# Patient Record
Sex: FEMALE | URBAN - METROPOLITAN AREA
[De-identification: names, ages, dates, MRNs, and addresses within clinical notes are randomized per-mention and may not be internally consistent; named-entity substitution may affect disease eponyms.]

---

## 2020-06-19 ENCOUNTER — OUT OF OFFICE VISIT (OUTPATIENT)
Dept: URBAN - METROPOLITAN AREA MEDICAL CENTER 33 | Facility: MEDICAL CENTER | Age: 30
End: 2020-06-19
Payer: SELF-PAY

## 2020-06-19 DIAGNOSIS — E87.6 HYPOKALEMIA: ICD-10-CM

## 2020-06-19 DIAGNOSIS — U07.1 COVID-19: ICD-10-CM

## 2020-06-19 DIAGNOSIS — R10.84 ABDOMINAL CRAMPING, GENERALIZED: ICD-10-CM

## 2020-06-19 DIAGNOSIS — R19.4 ALTERED BOWEL HABITS: ICD-10-CM

## 2020-06-19 DIAGNOSIS — K51.00 CHRONIC PANCOLONIC ULCERATIVE COLITIS: ICD-10-CM

## 2020-06-19 DIAGNOSIS — K92.1 BLACK STOOL: ICD-10-CM

## 2020-06-19 PROCEDURE — 99254 IP/OBS CNSLTJ NEW/EST MOD 60: CPT | Performed by: INTERNAL MEDICINE

## 2020-06-19 PROCEDURE — 99232 SBSQ HOSP IP/OBS MODERATE 35: CPT | Performed by: INTERNAL MEDICINE

## 2020-06-22 ENCOUNTER — OUT OF OFFICE VISIT (OUTPATIENT)
Dept: URBAN - METROPOLITAN AREA MEDICAL CENTER 33 | Facility: MEDICAL CENTER | Age: 30
End: 2020-06-22
Payer: SELF-PAY

## 2020-06-22 DIAGNOSIS — U07.1 COVID-19: ICD-10-CM

## 2020-06-22 DIAGNOSIS — R10.84 ABDOMINAL CRAMPING, GENERALIZED: ICD-10-CM

## 2020-06-22 DIAGNOSIS — K59.09 CHRONIC CONSTIPATION: ICD-10-CM

## 2020-06-22 DIAGNOSIS — R19.7 ACUTE DIARRHEA: ICD-10-CM

## 2020-06-22 DIAGNOSIS — K51.00 CHRONIC PANCOLONIC ULCERATIVE COLITIS: ICD-10-CM

## 2020-06-22 PROCEDURE — 99233 SBSQ HOSP IP/OBS HIGH 50: CPT | Performed by: INTERNAL MEDICINE

## 2020-06-24 ENCOUNTER — OUT OF OFFICE VISIT (OUTPATIENT)
Dept: URBAN - METROPOLITAN AREA MEDICAL CENTER 33 | Facility: MEDICAL CENTER | Age: 30
End: 2020-06-24

## 2020-06-24 ENCOUNTER — OUT OF OFFICE VISIT (OUTPATIENT)
Dept: URBAN - METROPOLITAN AREA MEDICAL CENTER 33 | Facility: MEDICAL CENTER | Age: 30
End: 2020-06-24
Payer: SELF-PAY

## 2020-06-24 DIAGNOSIS — K51.00 CHRONIC PANCOLONIC ULCERATIVE COLITIS: ICD-10-CM

## 2020-06-24 DIAGNOSIS — R10.84 ABDOMINAL CRAMPING, GENERALIZED: ICD-10-CM

## 2020-06-24 DIAGNOSIS — R19.7 ACUTE DIARRHEA: ICD-10-CM

## 2020-06-24 DIAGNOSIS — U07.1 2019 NOVEL CORONAVIRUS DETECTED: ICD-10-CM

## 2020-06-24 PROCEDURE — 99442 PHONE E/M BY PHYS 11-20 MIN: CPT | Performed by: INTERNAL MEDICINE

## 2020-06-24 PROCEDURE — 99233 SBSQ HOSP IP/OBS HIGH 50: CPT | Performed by: INTERNAL MEDICINE

## 2021-03-05 ENCOUNTER — OUT OF OFFICE VISIT (OUTPATIENT)
Dept: URBAN - METROPOLITAN AREA MEDICAL CENTER 33 | Facility: MEDICAL CENTER | Age: 31
End: 2021-03-05
Payer: SELF-PAY

## 2021-03-05 DIAGNOSIS — R10.32 ABDOMINAL CRAMPING IN LEFT LOWER QUADRANT: ICD-10-CM

## 2021-03-05 DIAGNOSIS — R19.7 ACUTE DIARRHEA: ICD-10-CM

## 2021-03-05 DIAGNOSIS — K51.818 OTHER ULCERATIVE COLITIS WITH OTHER COMPLICATION: ICD-10-CM

## 2021-03-05 DIAGNOSIS — R10.12 ABDOMINAL BURNING SENSATION IN LEFT UPPER QUADRANT: ICD-10-CM

## 2021-03-05 PROCEDURE — 99254 IP/OBS CNSLTJ NEW/EST MOD 60: CPT | Performed by: INTERNAL MEDICINE

## 2021-03-08 ENCOUNTER — TELEPHONE ENCOUNTER (OUTPATIENT)
Dept: URBAN - METROPOLITAN AREA CLINIC 92 | Facility: CLINIC | Age: 31
End: 2021-03-08

## 2021-03-19 ENCOUNTER — DASHBOARD ENCOUNTERS (OUTPATIENT)
Age: 31
End: 2021-03-19

## 2021-03-23 ENCOUNTER — OFFICE VISIT (OUTPATIENT)
Dept: URBAN - METROPOLITAN AREA CLINIC 17 | Facility: CLINIC | Age: 31
End: 2021-03-23

## 2021-08-19 VITALS
SYSTOLIC BLOOD PRESSURE: 178 MMHG | RESPIRATION RATE: 16 BRPM | DIASTOLIC BLOOD PRESSURE: 107 MMHG | HEART RATE: 87 BPM | TEMPERATURE: 98.7 F | OXYGEN SATURATION: 98 %

## 2021-08-19 PROCEDURE — 99284 EMERGENCY DEPT VISIT MOD MDM: CPT

## 2021-08-19 PROCEDURE — 99285 EMERGENCY DEPT VISIT HI MDM: CPT | Performed by: EMERGENCY MEDICINE

## 2021-08-20 ENCOUNTER — APPOINTMENT (EMERGENCY)
Dept: CT IMAGING | Facility: HOSPITAL | Age: 31
End: 2021-08-20

## 2021-08-20 ENCOUNTER — HOSPITAL ENCOUNTER (EMERGENCY)
Facility: HOSPITAL | Age: 31
Discharge: HOME | End: 2021-08-20
Attending: EMERGENCY MEDICINE

## 2021-08-20 DIAGNOSIS — N39.0 URINARY TRACT INFECTION: ICD-10-CM

## 2021-08-20 DIAGNOSIS — K51.90 ULCERATIVE COLITIS (HCC): Primary | ICD-10-CM

## 2021-08-20 LAB
ALBUMIN SERPL BCP-MCNC: 3.4 G/DL (ref 3.5–5)
ALP SERPL-CCNC: 136 U/L (ref 46–116)
ALT SERPL W P-5'-P-CCNC: 28 U/L (ref 12–78)
ANION GAP SERPL CALCULATED.3IONS-SCNC: 8 MMOL/L (ref 4–13)
AST SERPL W P-5'-P-CCNC: 16 U/L (ref 5–45)
BACTERIA UR QL AUTO: ABNORMAL /HPF
BASOPHILS # BLD AUTO: 0.02 THOUSANDS/ΜL (ref 0–0.1)
BASOPHILS NFR BLD AUTO: 0 % (ref 0–1)
BILIRUB DIRECT SERPL-MCNC: 0.08 MG/DL (ref 0–0.2)
BILIRUB SERPL-MCNC: 0.2 MG/DL (ref 0.2–1)
BILIRUB UR QL STRIP: NEGATIVE
BUN SERPL-MCNC: 15 MG/DL (ref 5–25)
CALCIUM SERPL-MCNC: 8.8 MG/DL (ref 8.3–10.1)
CHLORIDE SERPL-SCNC: 106 MMOL/L (ref 100–108)
CLARITY UR: CLEAR
CO2 SERPL-SCNC: 27 MMOL/L (ref 21–32)
COLOR UR: YELLOW
CREAT SERPL-MCNC: 0.6 MG/DL (ref 0.6–1.3)
EOSINOPHIL # BLD AUTO: 0.17 THOUSAND/ΜL (ref 0–0.61)
EOSINOPHIL NFR BLD AUTO: 2 % (ref 0–6)
ERYTHROCYTE [DISTWIDTH] IN BLOOD BY AUTOMATED COUNT: 13 % (ref 11.6–15.1)
EXT PREG TEST URINE: NORMAL
EXT. CONTROL ED NAV: NORMAL
GFR SERPL CREATININE-BSD FRML MDRD: 139 ML/MIN/1.73SQ M
GLUCOSE SERPL-MCNC: 112 MG/DL (ref 65–140)
GLUCOSE UR STRIP-MCNC: NEGATIVE MG/DL
HCG SERPL QL: NEGATIVE
HCT VFR BLD AUTO: 38.7 % (ref 34.8–46.1)
HGB BLD-MCNC: 12.5 G/DL (ref 11.5–15.4)
HGB UR QL STRIP.AUTO: NEGATIVE
IMM GRANULOCYTES # BLD AUTO: 0.01 THOUSAND/UL (ref 0–0.2)
IMM GRANULOCYTES NFR BLD AUTO: 0 % (ref 0–2)
KETONES UR STRIP-MCNC: NEGATIVE MG/DL
LEUKOCYTE ESTERASE UR QL STRIP: ABNORMAL
LYMPHOCYTES # BLD AUTO: 2.77 THOUSANDS/ΜL (ref 0.6–4.47)
LYMPHOCYTES NFR BLD AUTO: 35 % (ref 14–44)
MCH RBC QN AUTO: 27.7 PG (ref 26.8–34.3)
MCHC RBC AUTO-ENTMCNC: 32.3 G/DL (ref 31.4–37.4)
MCV RBC AUTO: 86 FL (ref 82–98)
MONOCYTES # BLD AUTO: 1.1 THOUSAND/ΜL (ref 0.17–1.22)
MONOCYTES NFR BLD AUTO: 14 % (ref 4–12)
NEUTROPHILS # BLD AUTO: 3.97 THOUSANDS/ΜL (ref 1.85–7.62)
NEUTS SEG NFR BLD AUTO: 49 % (ref 43–75)
NITRITE UR QL STRIP: NEGATIVE
NON-SQ EPI CELLS URNS QL MICRO: ABNORMAL /HPF
NRBC BLD AUTO-RTO: 0 /100 WBCS
OTHER STN SPEC: ABNORMAL
PH UR STRIP.AUTO: 5 [PH]
PLATELET # BLD AUTO: 277 THOUSANDS/UL (ref 149–390)
PMV BLD AUTO: 10.5 FL (ref 8.9–12.7)
POTASSIUM SERPL-SCNC: 3 MMOL/L (ref 3.5–5.3)
PROT SERPL-MCNC: 7 G/DL (ref 6.4–8.2)
PROT UR STRIP-MCNC: NEGATIVE MG/DL
RBC # BLD AUTO: 4.52 MILLION/UL (ref 3.81–5.12)
RBC #/AREA URNS AUTO: ABNORMAL /HPF
SODIUM SERPL-SCNC: 141 MMOL/L (ref 136–145)
SP GR UR STRIP.AUTO: >=1.03 (ref 1–1.03)
UROBILINOGEN UR QL STRIP.AUTO: 0.2 E.U./DL
WBC # BLD AUTO: 8.04 THOUSAND/UL (ref 4.31–10.16)
WBC #/AREA URNS AUTO: ABNORMAL /HPF

## 2021-08-20 PROCEDURE — 80048 BASIC METABOLIC PNL TOTAL CA: CPT | Performed by: EMERGENCY MEDICINE

## 2021-08-20 PROCEDURE — 96365 THER/PROPH/DIAG IV INF INIT: CPT

## 2021-08-20 PROCEDURE — 74177 CT ABD & PELVIS W/CONTRAST: CPT

## 2021-08-20 PROCEDURE — 85025 COMPLETE CBC W/AUTO DIFF WBC: CPT | Performed by: EMERGENCY MEDICINE

## 2021-08-20 PROCEDURE — 36415 COLL VENOUS BLD VENIPUNCTURE: CPT | Performed by: EMERGENCY MEDICINE

## 2021-08-20 PROCEDURE — 84703 CHORIONIC GONADOTROPIN ASSAY: CPT | Performed by: EMERGENCY MEDICINE

## 2021-08-20 PROCEDURE — 96366 THER/PROPH/DIAG IV INF ADDON: CPT

## 2021-08-20 PROCEDURE — 96375 TX/PRO/DX INJ NEW DRUG ADDON: CPT

## 2021-08-20 PROCEDURE — 81001 URINALYSIS AUTO W/SCOPE: CPT | Performed by: EMERGENCY MEDICINE

## 2021-08-20 PROCEDURE — 81025 URINE PREGNANCY TEST: CPT | Performed by: EMERGENCY MEDICINE

## 2021-08-20 PROCEDURE — 80076 HEPATIC FUNCTION PANEL: CPT | Performed by: EMERGENCY MEDICINE

## 2021-08-20 RX ORDER — CEPHALEXIN 500 MG/1
500 CAPSULE ORAL 3 TIMES DAILY
Qty: 15 CAPSULE | Refills: 0 | Status: SHIPPED | OUTPATIENT
Start: 2021-08-20 | End: 2021-08-20 | Stop reason: SDUPTHER

## 2021-08-20 RX ORDER — CEPHALEXIN 500 MG/1
500 CAPSULE ORAL 3 TIMES DAILY
Qty: 15 CAPSULE | Refills: 0 | Status: SHIPPED | OUTPATIENT
Start: 2021-08-20 | End: 2021-08-25

## 2021-08-20 RX ORDER — KETOROLAC TROMETHAMINE 30 MG/ML
15 INJECTION, SOLUTION INTRAMUSCULAR; INTRAVENOUS ONCE
Status: COMPLETED | OUTPATIENT
Start: 2021-08-20 | End: 2021-08-20

## 2021-08-20 RX ORDER — FLUCONAZOLE 200 MG/1
200 TABLET ORAL DAILY
Qty: 1 TABLET | Refills: 0 | Status: SHIPPED | OUTPATIENT
Start: 2021-08-20 | End: 2021-08-21

## 2021-08-20 RX ORDER — CEPHALEXIN 250 MG/1
500 CAPSULE ORAL ONCE
Status: COMPLETED | OUTPATIENT
Start: 2021-08-20 | End: 2021-08-20

## 2021-08-20 RX ORDER — ONDANSETRON 4 MG/1
4 TABLET, ORALLY DISINTEGRATING ORAL EVERY 6 HOURS PRN
Qty: 20 TABLET | Refills: 0 | Status: SHIPPED | OUTPATIENT
Start: 2021-08-20

## 2021-08-20 RX ORDER — ONDANSETRON 2 MG/ML
4 INJECTION INTRAMUSCULAR; INTRAVENOUS ONCE
Status: COMPLETED | OUTPATIENT
Start: 2021-08-20 | End: 2021-08-20

## 2021-08-20 RX ADMIN — ONDANSETRON 4 MG: 2 INJECTION INTRAMUSCULAR; INTRAVENOUS at 01:06

## 2021-08-20 RX ADMIN — KETOROLAC TROMETHAMINE 15 MG: 30 INJECTION, SOLUTION INTRAMUSCULAR at 01:06

## 2021-08-20 RX ADMIN — CEPHALEXIN 500 MG: 250 CAPSULE ORAL at 02:50

## 2021-08-20 RX ADMIN — SODIUM CHLORIDE, SODIUM LACTATE, POTASSIUM CHLORIDE, AND CALCIUM CHLORIDE 1000 ML: .6; .31; .03; .02 INJECTION, SOLUTION INTRAVENOUS at 00:55

## 2021-08-20 RX ADMIN — IOHEXOL 100 ML: 350 INJECTION, SOLUTION INTRAVENOUS at 01:27

## 2021-08-20 NOTE — Clinical Note
Mariia Maury was seen and treated in our emergency department on 8/19/2021  Diagnosis:     Katey Jones  may return to work on return date  She may return on this date: 08/23/2021         If you have any questions or concerns, please don't hesitate to call        Ksenia Tran MD    ______________________________           _______________          _______________  Hospital Representative                              Date                                Time

## 2021-08-20 NOTE — ED PROVIDER NOTES
History  Chief Complaint   Patient presents with    Abdominal Pain     pt presents with L flank pain, abd pain with mucous in stool x 1 week; pt concerned for UC flare up  pt states also naueaous at this time   Flank Pain       History provided by:  Patient   used: No    Abdominal Pain  Pain location:  Generalized  Pain quality: aching and cramping    Pain radiates to:  Does not radiate  Pain severity:  Moderate  Onset quality:  Gradual  Timing:  Constant  Progression:  Waxing and waning  Chronicity:  Recurrent  Relieved by:  Nothing  Worsened by:  Nothing  Ineffective treatments:  None tried  Associated symptoms: dysuria and nausea    Associated symptoms: no chest pain, no chills, no cough, no fever, no hematuria, no shortness of breath, no sore throat, no vaginal bleeding, no vaginal discharge and no vomiting        None       Past Medical History:   Diagnosis Date    Hypertension     [facility]        History reviewed  No pertinent surgical history  History reviewed  No pertinent family history  I have reviewed and agree with the history as documented  E-Cigarette/Vaping     E-Cigarette/Vaping Substances     Social History     Tobacco Use    Smoking status: Current Some Day Smoker    Smokeless tobacco: Never Used   Substance Use Topics    Alcohol use: Yes     Comment: socially    Drug use: Not on file       Review of Systems   Constitutional: Negative for chills and fever  HENT: Negative for ear pain and sore throat  Eyes: Negative for pain and visual disturbance  Respiratory: Negative for cough and shortness of breath  Cardiovascular: Negative for chest pain and palpitations  Gastrointestinal: Positive for abdominal pain and nausea  Negative for vomiting  Genitourinary: Positive for dysuria and frequency  Negative for hematuria, pelvic pain, vaginal bleeding and vaginal discharge  Musculoskeletal: Negative for arthralgias and back pain     Skin: Negative for color change and rash  Neurological: Negative for seizures and syncope  All other systems reviewed and are negative  Physical Exam  Physical Exam  Vitals and nursing note reviewed  Constitutional:       General: She is not in acute distress  Appearance: She is well-developed  HENT:      Head: Normocephalic and atraumatic  Eyes:      Conjunctiva/sclera: Conjunctivae normal    Cardiovascular:      Rate and Rhythm: Normal rate and regular rhythm  Heart sounds: No murmur heard  Pulmonary:      Effort: Pulmonary effort is normal  No respiratory distress  Breath sounds: Normal breath sounds  Abdominal:      Palpations: Abdomen is soft  Tenderness: There is abdominal tenderness  There is no guarding or rebound  Comments: Soft and nd with mild diffuse tenderness no r/g  Musculoskeletal:      Cervical back: Neck supple  Skin:     General: Skin is warm and dry  Neurological:      General: No focal deficit present  Mental Status: She is alert and oriented to person, place, and time           Vital Signs  ED Triage Vitals [08/19/21 2338]   Temperature Pulse Respirations Blood Pressure SpO2   98 7 °F (37 1 °C) 87 16 (!) 178/107 98 %      Temp Source Heart Rate Source Patient Position - Orthostatic VS BP Location FiO2 (%)   Oral Monitor Sitting Left arm --      Pain Score       8           Vitals:    08/19/21 2338   BP: (!) 178/107   Pulse: 87   Patient Position - Orthostatic VS: Sitting         Visual Acuity      ED Medications  Medications   lactated ringers bolus 1,000 mL (0 mL Intravenous Stopped 8/20/21 0247)   ketorolac (TORADOL) injection 15 mg (15 mg Intravenous Given 8/20/21 0106)   ondansetron (ZOFRAN) injection 4 mg (4 mg Intravenous Given 8/20/21 0106)   iohexol (OMNIPAQUE) 350 MG/ML injection (SINGLE-DOSE) 100 mL (100 mL Intravenous Given 8/20/21 0127)   cephalexin (KEFLEX) capsule 500 mg (500 mg Oral Given 8/20/21 0250)       Diagnostic Studies  Results Reviewed     Procedure Component Value Units Date/Time    Hepatic function panel [[id number]]  (Abnormal) Collected: 08/20/21 0056    Lab Status: Final result Specimen: Blood from Arm, Left Updated: 08/20/21 0201     Total Bilirubin 0 20 mg/dL      Bilirubin, Direct 0 08 mg/dL      Alkaline Phosphatase 136 U/L      AST 16 U/L      ALT 28 U/L      Total Protein 7 0 g/dL      Albumin 3 4 g/dL     hCG, qualitative pregnancy [[id number]]  (Normal) Collected: 08/20/21 0056    Lab Status: Final result Specimen: Blood from Arm, Left Updated: 08/20/21 0201     Preg, Serum Negative    Urine Microscopic [[id number]]  (Abnormal) Collected: 08/20/21 0108    Lab Status: Final result Specimen: Urine, Clean Catch Updated: 08/20/21 0129     RBC, UA None Seen /hpf      WBC, UA 4-10 /hpf      Epithelial Cells Moderate /hpf      Bacteria, UA Occasional /hpf      OTHER OBSERVATIONS Trichomonas Organisms Present     MUCUS THREADS --    Basic metabolic panel [[id number]]  (Abnormal) Collected: 08/20/21 0056    Lab Status: Final result Specimen: Blood from Arm, Left Updated: 08/20/21 0122     Sodium 141 mmol/L      Potassium 3 0 mmol/L      Chloride 106 mmol/L      CO2 27 mmol/L      ANION GAP 8 mmol/L      BUN 15 mg/dL      Creatinine 0 60 mg/dL      Glucose 112 mg/dL      Calcium 8 8 mg/dL      eGFR 139 ml/min/1 73sq m     Narrative:      [place] guidelines for Chronic Kidney Disease (CKD):     Stage 1 with normal or high GFR (GFR > 90 mL/min/1 73 square meters)    Stage 2 Mild CKD (GFR = 60-89 mL/min/1 73 square meters)    Stage 3A Moderate CKD (GFR = 45-59 mL/min/1 73 square meters)    Stage 3B Moderate CKD (GFR = 30-44 mL/min/1 73 square meters)    Stage 4 Severe CKD (GFR = 15-29 mL/min/1 73 square meters)    Stage 5 End Stage CKD (GFR <15 mL/min/1 73 square meters)  Note: GFR calculation is accurate only with a steady state creatinine    UA w Reflex to Microscopic w Reflex to Culture [[id number]]  (Abnormal) Collected: 08/20/21 0108    Lab Status: Final result Specimen: Urine, Clean Catch Updated: 08/20/21 0117     Color, UA Yellow     Clarity, UA Clear     Specific Gravity, UA >=1 030     pH, UA 5 0     Leukocytes, UA Trace     Nitrite, UA Negative     Protein, UA Negative mg/dl      Glucose, UA Negative mg/dl      Ketones, UA Negative mg/dl      Urobilinogen, UA 0 2 E U /dl      Bilirubin, UA Negative     Blood, UA Negative    POCT pregnancy, urine [[id number]]  (Normal) Resulted: 08/20/21 0112    Lab Status: Final result Updated: 08/20/21 0112     EXT PREG TEST UR (Ref: Negative) neg     Control valid    CBC and differential [[id number]]  (Abnormal) Collected: 08/20/21 0056    Lab Status: Final result Specimen: Blood from Arm, Left Updated: 08/20/21 0103     WBC 8 04 Thousand/uL      RBC 4 52 Million/uL      Hemoglobin 12 5 g/dL      Hematocrit 38 7 %      MCV 86 fL      MCH 27 7 pg      MCHC 32 3 g/dL      RDW 13 0 %      MPV 10 5 fL      Platelets 776 Thousands/uL      nRBC 0 /100 WBCs      Neutrophils Relative 49 %      Immat GRANS % 0 %      Lymphocytes Relative 35 %      Monocytes Relative 14 %      Eosinophils Relative 2 %      Basophils Relative 0 %      Neutrophils Absolute 3 97 Thousands/µL      Immature Grans Absolute 0 01 Thousand/uL      Lymphocytes Absolute 2 77 Thousands/µL      Monocytes Absolute 1 10 Thousand/µL      Eosinophils Absolute 0 17 Thousand/µL      Basophils Absolute 0 02 Thousands/µL                  CT abdomen pelvis with contrast   Final Result by Rehana Johnson MD (08/20 7243)      1  Mild wall thickening of cecum, ascending colon, and transverse colon could be related to underdistention or mild colitis  Correlate clinically  2   Nonobstructing bilateral renal calculi  The study was marked in [facility] for immediate notification               Workstation performed: [id number]                    Procedures  Procedures         ED Course SBIRT 22yo+      Most Recent Value   SBIRT (22 yo +)   In order to provide better care to our patients, we are screening all of our patients for alcohol and drug use  Would it be okay to ask you these screening questions? No Filed at: 08/20/2021 0024                    MDM  Number of Diagnoses or Management Options  Ulcerative colitis [facility]): new and requires workup  Urinary tract infection: new and requires workup  Diagnosis management comments: 34 yo female presents with diffuse cramping abdominal pain today  She reports a history of ulcerative colitis states that her current symptoms seem similar to previous exacerbations and UC  She has also had some dysuria and frequency but denies significant or abnormal vaginal discharge or menstrual complaints  She recently moved to the area and so does not currently have a PCP or gastroenterologist   She did start taking some prednisone which she had on hand which she typically tapers for acute exacerbations such as this  CT scan of the abdomen and pelvis his consistent with colitis, likely from patient's UC based on her history  UA also suspicious for possible UTI a he given complaint of frequency  Will treat, referred to gastroenterology  Discussed return precautions         Amount and/or Complexity of Data Reviewed  Clinical lab tests: reviewed and ordered  Tests in the radiology section of CPT®: reviewed and ordered  Review and summarize past medical records: yes        Disposition  Final diagnoses:   Ulcerative colitis ([facility] )   Urinary tract infection     Time reflects when diagnosis was documented in both MDM as applicable and the Disposition within this note     Time User Action Codes Description Comment    8/20/2021  2:42 AM Jacki NAGEL Add [K51 90] Ulcerative colitis ([facility] )     8/20/2021  2:42 AM Kwasi Patel Add [N39 0] Urinary tract infection       ED Disposition     ED Disposition Condition Date/Time Comment    Discharge Stable Fri Aug 20, 2021  2:42 AM Brockrocío Prince discharge to home/self care  Follow-up Information     Follow up With Specialties Details Why Contact Info Additional [id number] [facility], [address], Nurse Practitioner   21 [phone] [address] [facility]  [address]  [phone]       [address] [facility] [place]   [address]  [place] [address] [phone] [address] [facility] [place], [address], [place], [place], [id number] [phone]          Discharge Medication List as of 8/20/2021  2:45 AM      START taking these medications    Details   fluconazole (DIFLUCAN) 200 mg tablet Take 1 tablet (200 mg total) by mouth daily for 1 day, Starting Fri 8/20/2021, Until Sat 8/21/2021, Print      ondansetron (ZOFRAN-ODT) 4 mg disintegrating tablet Take 1 tablet (4 mg total) by mouth every 6 (six) hours as needed for nausea, Starting Fri 8/20/2021, Print      cephalexin (KEFLEX) 500 mg capsule Take 1 capsule (500 mg total) by mouth 3 (three) times a day for 5 days, Starting Fri 8/20/2021, Until Wed 8/25/2021, Print           No discharge procedures on file      PDMP Review     None          ED Provider  Electronically Signed by           Adela Diallo MD  09/07/21 3233

## 2022-04-20 ENCOUNTER — OFFICE VISIT (OUTPATIENT)
Dept: GASTROENTEROLOGY | Facility: CLINIC | Age: 32
End: 2022-04-20
Payer: COMMERCIAL

## 2022-04-20 VITALS
HEIGHT: 69 IN | SYSTOLIC BLOOD PRESSURE: 122 MMHG | WEIGHT: 166 LBS | BODY MASS INDEX: 24.59 KG/M2 | DIASTOLIC BLOOD PRESSURE: 84 MMHG

## 2022-04-20 DIAGNOSIS — R11.0 NAUSEA: ICD-10-CM

## 2022-04-20 DIAGNOSIS — K51.919 ULCERATIVE COLITIS WITH COMPLICATION, UNSPECIFIED LOCATION (HCC): Primary | ICD-10-CM

## 2022-04-20 DIAGNOSIS — R63.0 POOR APPETITE: ICD-10-CM

## 2022-04-20 PROCEDURE — 99204 OFFICE O/P NEW MOD 45 MIN: CPT | Performed by: PHYSICIAN ASSISTANT

## 2022-04-20 RX ORDER — ONDANSETRON 4 MG/1
4 TABLET, ORALLY DISINTEGRATING ORAL EVERY 6 HOURS PRN
Qty: 45 TABLET | Refills: 2 | Status: SHIPPED | OUTPATIENT
Start: 2022-04-20 | End: 2022-07-05 | Stop reason: ALTCHOICE

## 2022-04-20 RX ORDER — DICYCLOMINE HCL 20 MG
20 TABLET ORAL EVERY 6 HOURS PRN
Qty: 90 TABLET | Refills: 2 | Status: SHIPPED | OUTPATIENT
Start: 2022-04-20

## 2022-04-20 NOTE — PATIENT INSTRUCTIONS
Scheduled date of EGD/colonoscopy (as of today):5/19/22  Physician performing EGD/colonoscopy: Chapito  Location of EGD/colonoscopy:[facility]  Desired bowel prep reviewed with patient:miralax/dulcolax  Instructions reviewed with patient by:Alejandra ACUNA  Clearances:  none

## 2022-04-20 NOTE — H&P (VIEW-ONLY)
[facility]  Fernandez Kendall 27 y o  female MRN: [id number]       CC: NP to establish for history of ulcerative colitis    HPI:  Irwin Pal is a pleasant 75-year-old female who was diagnosed with ulcerative colitis in 2012 while living in [place], [place]  Patient also has history of latentTB status post treatment with isoniazid  Fortunately, somewhat patient's records are available to us  In February 2012 she had a flexible sigmoidoscopy revealing colitis, edema and erythema with friable tissue from the rectum to 25 cm  At that time, she was treated with mesalamine enemas and oral mesalamine  A following year she had another flexible sigmoidoscopy showing disease up to 60 cm  Patient reports that at that time they were contemplating biologic treatment, but then found out that she had a positive PPD for latent TB  She was treated for latent TB with Infectious Disease  She then became pregnant  She reports that she is not on any maintenance treatment for her UC  She reports that her last flare of symptoms was in 2020 requiring prednisone  Patient denies rectal bleeding currently, but does report increased mucus in her stool  She reports poor appetite and weight loss over the past 2 years of 15-20 lb  Patient was actually seen in our emergency room last year for UTI  CT scan did show mild thickening of the cecum, ascending and transverse colon  She has recently established with a new primary care, will be going for blood work hopefully tomorrow  There is no family history of colon cancer  Her sister has Crohn's disease she reports  Review of Systems:    CONSTITUTIONAL: Denies any fever, chills, or rigors  Good appetite, and no recent weight loss  HEENT: No earache or tinnitus  Denies hearing loss or visual disturbances  CARDIOVASCULAR: No chest pain or palpitations  RESPIRATORY: Denies any cough, hemoptysis, shortness of breath or dyspnea on exertion    GASTROINTESTINAL: As noted in the History of Present Illness  GENITOURINARY: No problems with urination  Denies any hematuria or dysuria  NEUROLOGIC: No dizziness or vertigo, denies headaches  MUSCULOSKELETAL: Denies any muscle or joint pain  SKIN: Denies skin rashes or itching  ENDOCRINE: Denies excessive thirst  Denies intolerance to heat or cold  PSYCHOSOCIAL: Denies depression or anxiety  Denies any recent memory loss  Current Outpatient Medications   Medication Sig Dispense Refill    ondansetron (ZOFRAN-ODT) 4 mg disintegrating tablet Take 1 tablet (4 mg total) by mouth every 6 (six) hours as needed for nausea 20 tablet 0     No current facility-administered medications for this visit  Past Medical History:   Diagnosis Date    Hypertension     [facility]      History reviewed  No pertinent surgical history  Social History     Socioeconomic History    Marital status: Single     Spouse name: None    Number of children: None    Years of education: None    Highest education level: None   Occupational History    None   Tobacco Use    Smoking status: Current Some Day Smoker    Smokeless tobacco: Never Used   Substance and Sexual Activity    Alcohol use: Yes     Comment: socially    Drug use: None    Sexual activity: None   Other Topics Concern    None   Social History Narrative    None     Social Determinants of Health     Financial Resource Strain: Not on file   Food Insecurity: Not on file   Transportation Needs: Not on file   Physical Activity: Not on file   Stress: Not on file   Social Connections: Not on file   Intimate Partner Violence: Not on file   Housing Stability: Not on file     History reviewed  No pertinent family history  PHYSICAL EXAM:    Vitals:    04/20/22 1540   BP: 122/84   Weight: 75 3 kg (166 lb)   Height: 5' 9" (1 753 m)     General Appearance:   Alert and oriented x 3   Cooperative, and in no respiratory distress   HEENT:   Normocephalic, atraumatic, anicteric      Neck:  Supple, symmetrical, trachea midline   Lungs:   Clear to auscultation bilaterally    Heart[de-identified]   Regular rate and rhythm   Abdomen:   Soft, non-tender, non-distended; normal bowel sounds; no masses, no organomegaly    Genitalia:   Deferred    Rectal:   Deferred    Extremities:  No cyanosis, clubbing or edema    Pulses:  2+ and symmetric all extremities    Skin:  Skin color, texture, turgor normal, no rashes or lesions    Lymph nodes:  No palpable cervical or supraclavicular lymphadenopathy        Lab Results:             Invalid input(s): LABALBU            Imaging Studies: I have personally reviewed pertinent imaging studies  CT abdomen pelvis with contrast    Result Date: 8/20/2021  Impression: 1  Mild wall thickening of cecum, ascending colon, and transverse colon could be related to underdistention or mild colitis  Correlate clinically  2   Nonobstructing bilateral renal calculi  The study was marked in [facility] for immediate notification  Workstation performed: [id number]       ASSESSMENT and PLAN:      1) History of UC, poor appetite, nausea -  Patient is not on anything for maintenance  She reports that when she was a flare, she has prednisone and mesalamine at home  She reports that her disease was last evaluated 3-4 years ago  Those records are not available to me  I am able to access her records when she was 1st diagnosed in 2012  -  Labs ordered  -  Will plan for EGD and colonoscopy to investigate  -  I told her that if she were to need biologic treatment, we would refer her to Infectious Disease  However, we have had other patients who have been able to start biologic after treatment of latent TB, which it sounds like she did have treated while living in [place]  -  Further recommendations regarding treatment based on her ulcerative colitis depending on above  -  Will send Zofran and Bentyl        Follow up after EGD and colonoscopy

## 2022-04-20 NOTE — PROGRESS NOTES
[facility]  Donald Jordangaby 27 y o  female MRN: [id number]       CC: NP to establish for history of ulcerative colitis    HPI:  Mary Rutledge is a pleasant 41-year-old female who was diagnosed with ulcerative colitis in 2012 while living in [facility]  Patient also has history of latentTB status post treatment with isoniazid  Fortunately, somewhat patient's records are available to us  In February 2012 she had a flexible sigmoidoscopy revealing colitis, edema and erythema with friable tissue from the rectum to 25 cm  At that time, she was treated with mesalamine enemas and oral mesalamine  A following year she had another flexible sigmoidoscopy showing disease up to 60 cm  Patient reports that at that time they were contemplating biologic treatment, but then found out that she had a positive PPD for latent TB  She was treated for latent TB with Infectious Disease  She then became pregnant  She reports that she is not on any maintenance treatment for her UC  She reports that her last flare of symptoms was in 2020 requiring prednisone  Patient denies rectal bleeding currently, but does report increased mucus in her stool  She reports poor appetite and weight loss over the past 2 years of 15-20 lb  Patient was actually seen in our emergency room last year for UTI  CT scan did show mild thickening of the cecum, ascending and transverse colon  She has recently established with a new primary care, will be going for blood work hopefully tomorrow  There is no family history of colon cancer  Her sister has Crohn's disease she reports  Review of Systems:    CONSTITUTIONAL: Denies any fever, chills, or rigors  Good appetite, and no recent weight loss  HEENT: No earache or tinnitus  Denies hearing loss or visual disturbances  CARDIOVASCULAR: No chest pain or palpitations  RESPIRATORY: Denies any cough, hemoptysis, shortness of breath or dyspnea on exertion    GASTROINTESTINAL: As noted in the History of Present Illness  GENITOURINARY: No problems with urination  Denies any hematuria or dysuria  NEUROLOGIC: No dizziness or vertigo, denies headaches  MUSCULOSKELETAL: Denies any muscle or joint pain  SKIN: Denies skin rashes or itching  ENDOCRINE: Denies excessive thirst  Denies intolerance to heat or cold  PSYCHOSOCIAL: Denies depression or anxiety  Denies any recent memory loss  Current Outpatient Medications   Medication Sig Dispense Refill    ondansetron (ZOFRAN-ODT) 4 mg disintegrating tablet Take 1 tablet (4 mg total) by mouth every 6 (six) hours as needed for nausea 20 tablet 0     No current facility-administered medications for this visit  Past Medical History:   Diagnosis Date    Hypertension     [facility] )      History reviewed  No pertinent surgical history  Social History     Socioeconomic History    Marital status: Single     Spouse name: None    Number of children: None    Years of education: None    Highest education level: None   Occupational History    None   Tobacco Use    Smoking status: Current Some Day Smoker    Smokeless tobacco: Never Used   Substance and Sexual Activity    Alcohol use: Yes     Comment: socially    Drug use: None    Sexual activity: None   Other Topics Concern    None   Social History Narrative    None     Social Determinants of Health     Financial Resource Strain: Not on file   Food Insecurity: Not on file   Transportation Needs: Not on file   Physical Activity: Not on file   Stress: Not on file   Social Connections: Not on file   Intimate Partner Violence: Not on file   Housing Stability: Not on file     History reviewed  No pertinent family history  PHYSICAL EXAM:    Vitals:    04/20/22 1540   BP: 122/84   Weight: 75 3 kg (166 lb)   Height: 5' 9" (1 753 m)     General Appearance:   Alert and oriented x 3   Cooperative, and in no respiratory distress   HEENT:   Normocephalic, atraumatic, anicteric      Neck:  Supple, symmetrical, trachea midline   Lungs:   Clear to auscultation bilaterally    Heart[de-identified]   Regular rate and rhythm   Abdomen:   Soft, non-tender, non-distended; normal bowel sounds; no masses, no organomegaly    Genitalia:   Deferred    Rectal:   Deferred    Extremities:  No cyanosis, clubbing or edema    Pulses:  2+ and symmetric all extremities    Skin:  Skin color, texture, turgor normal, no rashes or lesions    Lymph nodes:  No palpable cervical or supraclavicular lymphadenopathy        Lab Results:             Invalid input(s): LABALBU            Imaging Studies: I have personally reviewed pertinent imaging studies  CT abdomen pelvis with contrast    Result Date: 8/20/2021  Impression: 1  Mild wall thickening of cecum, ascending colon, and transverse colon could be related to underdistention or mild colitis  Correlate clinically  2   Nonobstructing bilateral renal calculi  The study was marked in [facility] for immediate notification  Workstation performed: [id number]       ASSESSMENT and PLAN:      1) History of UC, poor appetite, nausea -  Patient is not on anything for maintenance  She reports that when she was a flare, she has prednisone and mesalamine at home  She reports that her disease was last evaluated 3-4 years ago  Those records are not available to me  I am able to access her records when she was 1st diagnosed in 2012  -  Labs ordered  -  Will plan for EGD and colonoscopy to investigate  -  I told her that if she were to need biologic treatment, we would refer her to Infectious Disease  However, we have had other patients who have been able to start biologic after treatment of latent TB, which it sounds like she did have treated while living in [facility]  -  Further recommendations regarding treatment based on her ulcerative colitis depending on above  -  Will send Zofran and Bentyl        Follow up after EGD and colonoscopy

## 2022-05-12 ENCOUNTER — HOSPITAL ENCOUNTER (OUTPATIENT)
Dept: GASTROENTEROLOGY | Facility: HOSPITAL | Age: 32
Setting detail: OUTPATIENT SURGERY
Discharge: HOME | End: 2022-05-12
Admitting: PHYSICIAN ASSISTANT
Payer: COMMERCIAL

## 2022-05-12 ENCOUNTER — ANESTHESIA EVENT (OUTPATIENT)
Dept: GASTROENTEROLOGY | Facility: HOSPITAL | Age: 32
End: 2022-05-12

## 2022-05-12 ENCOUNTER — TELEPHONE (OUTPATIENT)
Dept: GASTROENTEROLOGY | Facility: CLINIC | Age: 32
End: 2022-05-12

## 2022-05-12 ENCOUNTER — ANESTHESIA (OUTPATIENT)
Dept: GASTROENTEROLOGY | Facility: HOSPITAL | Age: 32
End: 2022-05-12

## 2022-05-12 VITALS
BODY MASS INDEX: 24.32 KG/M2 | RESPIRATION RATE: 17 BRPM | WEIGHT: 160.5 LBS | HEART RATE: 72 BPM | OXYGEN SATURATION: 100 % | SYSTOLIC BLOOD PRESSURE: 126 MMHG | DIASTOLIC BLOOD PRESSURE: 65 MMHG | TEMPERATURE: 99 F | HEIGHT: 68 IN

## 2022-05-12 DIAGNOSIS — K51.30 ULCERATIVE RECTOSIGMOIDITIS WITHOUT COMPLICATION (HCC): Primary | ICD-10-CM

## 2022-05-12 DIAGNOSIS — K51.919 ULCERATIVE COLITIS WITH COMPLICATION, UNSPECIFIED LOCATION (HCC): ICD-10-CM

## 2022-05-12 DIAGNOSIS — K58.0 IRRITABLE BOWEL SYNDROME WITH DIARRHEA: ICD-10-CM

## 2022-05-12 DIAGNOSIS — R63.0 POOR APPETITE: ICD-10-CM

## 2022-05-12 DIAGNOSIS — R11.0 NAUSEA: ICD-10-CM

## 2022-05-12 PROCEDURE — 88305 TISSUE EXAM BY PATHOLOGIST: CPT | Performed by: PATHOLOGY

## 2022-05-12 PROCEDURE — 45378 DIAGNOSTIC COLONOSCOPY: CPT | Performed by: INTERNAL MEDICINE

## 2022-05-12 PROCEDURE — 43239 EGD BIOPSY SINGLE/MULTIPLE: CPT | Performed by: INTERNAL MEDICINE

## 2022-05-12 RX ORDER — METOCLOPRAMIDE HYDROCHLORIDE 5 MG/ML
10 INJECTION INTRAMUSCULAR; INTRAVENOUS ONCE AS NEEDED
Status: CANCELLED | OUTPATIENT
Start: 2022-05-12

## 2022-05-12 RX ORDER — LIDOCAINE HYDROCHLORIDE 10 MG/ML
INJECTION, SOLUTION EPIDURAL; INFILTRATION; INTRACAUDAL; PERINEURAL AS NEEDED
Status: DISCONTINUED | OUTPATIENT
Start: 2022-05-12 | End: 2022-05-12

## 2022-05-12 RX ORDER — PROPOFOL 10 MG/ML
INJECTION, EMULSION INTRAVENOUS AS NEEDED
Status: DISCONTINUED | OUTPATIENT
Start: 2022-05-12 | End: 2022-05-12

## 2022-05-12 RX ORDER — DIPHENHYDRAMINE HYDROCHLORIDE 50 MG/ML
12.5 INJECTION INTRAMUSCULAR; INTRAVENOUS ONCE AS NEEDED
Status: CANCELLED | OUTPATIENT
Start: 2022-05-12

## 2022-05-12 RX ORDER — SODIUM CHLORIDE, SODIUM LACTATE, POTASSIUM CHLORIDE, CALCIUM CHLORIDE 600; 310; 30; 20 MG/100ML; MG/100ML; MG/100ML; MG/100ML
125 INJECTION, SOLUTION INTRAVENOUS CONTINUOUS
Status: DISCONTINUED | OUTPATIENT
Start: 2022-05-12 | End: 2022-05-16 | Stop reason: HOSPADM

## 2022-05-12 RX ORDER — MESALAMINE 1.2 G/1
2400 TABLET, DELAYED RELEASE ORAL
Qty: 180 TABLET | Refills: 4 | Status: SHIPPED | OUTPATIENT
Start: 2022-05-12

## 2022-05-12 RX ORDER — LIDOCAINE HYDROCHLORIDE 10 MG/ML
0.5 INJECTION, SOLUTION EPIDURAL; INFILTRATION; INTRACAUDAL; PERINEURAL ONCE AS NEEDED
Status: DISCONTINUED | OUTPATIENT
Start: 2022-05-12 | End: 2022-05-16 | Stop reason: HOSPADM

## 2022-05-12 RX ORDER — ONDANSETRON 2 MG/ML
4 INJECTION INTRAMUSCULAR; INTRAVENOUS ONCE AS NEEDED
Status: CANCELLED | OUTPATIENT
Start: 2022-05-12

## 2022-05-12 RX ORDER — SODIUM CHLORIDE, SODIUM LACTATE, POTASSIUM CHLORIDE, CALCIUM CHLORIDE 600; 310; 30; 20 MG/100ML; MG/100ML; MG/100ML; MG/100ML
INJECTION, SOLUTION INTRAVENOUS CONTINUOUS PRN
Status: DISCONTINUED | OUTPATIENT
Start: 2022-05-12 | End: 2022-05-12

## 2022-05-12 RX ADMIN — PROPOFOL 100 MG: 10 INJECTION, EMULSION INTRAVENOUS at 09:32

## 2022-05-12 RX ADMIN — PROPOFOL 100 MG: 10 INJECTION, EMULSION INTRAVENOUS at 09:30

## 2022-05-12 RX ADMIN — LIDOCAINE HYDROCHLORIDE 100 MG: 10 INJECTION, SOLUTION EPIDURAL; INFILTRATION; INTRACAUDAL at 09:29

## 2022-05-12 RX ADMIN — PROPOFOL 100 MG: 10 INJECTION, EMULSION INTRAVENOUS at 09:29

## 2022-05-12 RX ADMIN — PROPOFOL 50 MG: 10 INJECTION, EMULSION INTRAVENOUS at 09:39

## 2022-05-12 RX ADMIN — PROPOFOL 50 MG: 10 INJECTION, EMULSION INTRAVENOUS at 09:35

## 2022-05-12 RX ADMIN — SODIUM CHLORIDE, SODIUM LACTATE, POTASSIUM CHLORIDE, AND CALCIUM CHLORIDE: .6; .31; .03; .02 INJECTION, SOLUTION INTRAVENOUS at 09:24

## 2022-05-12 NOTE — INTERVAL H&P NOTE
H&P reviewed  After examining the patient I find no changes in the patients condition since the H&P had been written      Vitals:    05/12/22 0909   BP: 133/77   Pulse: 72   Resp: 15   Temp: 97 9 °F (36 6 °C)   SpO2: 100%

## 2022-05-12 NOTE — ANESTHESIA POSTPROCEDURE EVALUATION
Post-Op Assessment Note    CV Status:  Stable  Pain Score: 0    Pain management: adequate     Mental Status:  Alert and awake   Hydration Status:  Euvolemic   PONV Controlled:  Controlled   Airway Patency:  Patent      Post Op Vitals Reviewed: Yes      Staff: Anesthesiologist, CRNA         No complications documented      /58 (05/12/22 0948)    Temp 99 °F (37 2 °C) (05/12/22 0948)    Pulse 90 (05/12/22 0948)   Resp 17 (05/12/22 0948)    SpO2 100 % (05/12/22 0948)

## 2022-05-12 NOTE — ANESTHESIA PREPROCEDURE EVALUATION
Procedure:  COLONOSCOPY  EGD    Relevant Problems   ANESTHESIA (within normal limits)      CARDIO   (+) Hypertension      ENDO (within normal limits)      GI/HEPATIC (within normal limits)      /RENAL (within normal limits)      GYN (within normal limits)      HEMATOLOGY (within normal limits)      MUSCULOSKELETAL (within normal limits)      NEURO/PSYCH (within normal limits)      PULMONARY   (+) Smoker      Digestive   (+) Ulcerative colitis (HCC)        Physical Exam    Airway    Mallampati score: II  TM Distance: >3 FB  Neck ROM: full     Dental   No notable dental hx     Cardiovascular  Rhythm: regular, Rate: normal, Cardiovascular exam normal    Pulmonary  Pulmonary exam normal Breath sounds clear to auscultation,     Other Findings        Anesthesia Plan  ASA Score- 2     Anesthesia Type- IV sedation with anesthesia with ASA Monitors  Additional Monitors:   Airway Plan:           Plan Factors-    Chart reviewed  Existing labs reviewed  Patient summary reviewed  Induction- intravenous  Postoperative Plan-     Informed Consent- Anesthetic plan and risks discussed with patient  I personally reviewed this patient with the CRNA  Discussed and agreed on the Anesthesia Plan with the CRNA  Alden Gilliam Recent labs personally reviewed:  Lab Results   Component Value Date    WBC 8 04 08/20/2021    HGB 12 5 08/20/2021     08/20/2021     Lab Results   Component Value Date    K 3 0 (L) 08/20/2021    BUN 15 08/20/2021    CREATININE 0 60 08/20/2021     I, Ai Oshea MD, have personally seen and evaluated the patient prior to anesthetic care  I have reviewed the pre-anesthetic record, and other medical records if appropriate to the anesthetic care  If a CRNA is involved in the case, I have reviewed the CRNA assessment, if present, and agree   Risks/benefits and alternatives discussed with patient including possible PONV, sore throat, and possibility of rare anesthetic and surgical emergencies

## 2022-05-13 ENCOUNTER — TELEPHONE (OUTPATIENT)
Dept: GASTROENTEROLOGY | Facility: CLINIC | Age: 32
End: 2022-05-13

## 2022-05-13 RX ORDER — MESALAMINE 800 MG/1
800 TABLET, DELAYED RELEASE ORAL 3 TIMES DAILY
COMMUNITY

## 2022-05-13 NOTE — TELEPHONE ENCOUNTER
Called and spoke to patient  Let her know that her [place]on Kay was approved  Will fax to pharmacy ([facility] ) and then scan into chart  Approval is from 5/13/2022    Laura Womack 5/27/2022

## 2022-05-13 NOTE — TELEPHONE ENCOUNTER
Called 3  and spoke to [facility]  Xifaxan was approved from 5/13/22 till 5/27/2022  PA # [id number]  Approval letter will be faxed over  When received will fax to patients pharmacy and scan into patients chart  Will call patient to let them know it was approved

## 2022-05-13 NOTE — PROGRESS NOTES
Diagnoses and all orders for this visit:    Encounter for annual routine gynecological examination    Encounter for screening for cervical cancer  -     Liquid-based pap, screening    Screening for STD (sexually transmitted disease)  -     Chlamydia/GC amplified DNA by PCR; Future    A pap smear was performed  ASCCP guidelines reviewed  Perineal hygiene reviewed  SBE, daily exercise and healthy diet with adequate calcium and vitamin D encouraged  Weight bearing exercises a minimum of 150 minutes/week advised  Gardisil vaccines recommended up to age 39  Advised to call with any issues, all concerns & questions addressed  See provided information in your after visit summary     F/U for Mirena insertion  Results will be released to [facility], if abnormal will call or message to review and discuss treatment plan  Health Maintenance:    Last PAP: she believes her last pap was 3+ years ago  She reports one abnormal pap when she was in her early 25s (HPV+) but nothing abnormal since  Next PAP Due: done today  Gardasil Vaccine Series: She has not had the Gardasil series  Gardasil 9 was advised for prevention of HPV-related disease  We discussed risks/benefits, SE's/AE's at length and all questions were answered  Written info was provided for review  The patient agrees to consider and is aware she may call to schedule injection #1 at any time  Subjective    CC: Yearly Exam     Early Hanna is a 27 y o  female here for an annual exam  Pooja Headley    Her menstrual cycles are rare  She had Mirena in for 8 years and was amenorrheic  She had her Mirena removed this past February [facility] and had a period in April  She denies any issues with bleeding or her menses  She denies breast concerns, abnormal vaginal discharge, vaginal itching, odor, irritation, bowel/bladder dysfunction, urinary symptoms, pelvic pain, or dyspareunia today  She is sexually active  Monogamous relationship   Denies intimate partner violence  She denies issues with intimacy  Her current method of contraception includes spermicide  She had Mirena IUD in place for many years and was very happy with that form of birth control  She would like to schedule a Mirena insertion today  We discussed SE/AEs, risks and benefits  She has no questions  STD testing:  She does not want STD testing today  Patient's last menstrual period was 04/25/2022 (exact date)  Past Medical History:   Diagnosis Date    Hypertension     Ulcerative colitis ([address] )      Past Surgical History:   Procedure Laterality Date    APPENDECTOMY           There is no immunization history on file for this patient  History reviewed  No pertinent family history  Social History     Tobacco Use    Smoking status: Current Some Day Smoker    Smokeless tobacco: Never Used   Substance Use Topics    Alcohol use: Yes     Comment: socially    Drug use: Yes     Types: Marijuana       Current Outpatient Medications:     dicyclomine (BENTYL) 20 mg tablet, Take 1 tablet (20 mg total) by mouth every 6 (six) hours as needed (abdominal cramping), Disp: 90 tablet, Rfl: 2    mesalamine (ASACOL) 800 MG EC tablet, Take 800 mg by mouth in the morning and 800 mg at noon and 800 mg in the evening , Disp: , Rfl:     mesalamine (LIALDA) 1 2 g EC tablet, Take 2 tablets (2 4 g total) by mouth daily with breakfast, Disp: 180 tablet, Rfl: 4    ondansetron (Zofran ODT) 4 mg disintegrating tablet, Take 1 tablet (4 mg total) by mouth every 6 (six) hours as needed for nausea or vomiting, Disp: 45 tablet, Rfl: 2    ondansetron (ZOFRAN-ODT) 4 mg disintegrating tablet, Take 1 tablet (4 mg total) by mouth every 6 (six) hours as needed for nausea, Disp: 20 tablet, Rfl: 0    rifaximin (XIFAXAN) 550 mg tablet, Take 1 tablet (550 mg total) by mouth every 8 (eight) hours for 14 days, Disp: 42 tablet, Rfl: 0  No current facility-administered medications for this visit    Patient Active Problem List Diagnosis Date Noted    Hypertension     Ulcerative colitis ([facility] [address] )     Smoker        Allergies   Allergen Reactions    Shellfish Allergy - Food Allergy Other (See Comments) and Rash     Itching and swelling of mouth & lips       OB History    Para Term  AB Living   2 2 2     2   SAB IAB Ectopic Multiple Live Births           2      # Outcome Date GA Lbr Keith/2nd Weight Sex Delivery Anes PTL Lv   2 Term 09 39w0d  3090 g (6 lb 13 oz) F Vag-Spont EPI  LEE      Birth Comments: NO COMPS   1 Term      Vag-Spont   LEE       Vitals:    22 0921   BP: 122/80   BP Location: Left arm   Patient Position: Sitting   Cuff Size: Standard   Weight: 74 4 kg (164 lb)   Height: 5' 8" (1 727 m)     Body mass index is 24 94 kg/m²  Review of Systems     Constitutional: Negative for chills, fatigue, fever and unexpected weight change  Respiratory: Negative for cough and shortness of breath  Cardiovascular: Negative for chest pain, palpitations and leg swelling  Gastrointestinal: Negative for abdominal distention, abdominal pain, blood in stool, constipation, diarrhea and nausea  Endocrine: Negative for cold intolerance and heat intolerance  Genitourinary: Negative for difficulty urinating, dyspareunia, dysuria, frequency, hematuria, menstrual problem, pelvic pain, urgency, vaginal bleeding, vaginal discharge and vaginal pain  Musculoskeletal: Negative for back pain  Skin: Negative for rash  Neurological: Negative for headaches  Psychiatric/Behavioral: Negative for confusion and dysphoric mood  Physical Exam      Constitutional: Alert  Normal appearance  No acute distress  well-developed and well-nourished  HEENT: Atraumatic, Normocephalic, Conjunctivae clear  Neck: Normal range of motion  Neck supple  No thyromegaly  Heart: regular rate and rhythm  Lungs: CTA bilaterally  Effort normal   Breasts: symmetrical without mass, tenderness, nipple discharge, or skin changes   No inverted nipple bilaterally  No axillary or supraclavicular lymphadenopathy bilaterally  Abdomen: soft, nontender, not distended  Negative for masses  Musculoskeletal: Normal ROM  Skin: warm and dry  Psychological: Normal mood, thought content, behavior & judgement      Pelvic exam was performed with patient supine, lithotomy position  External genital is without rash, tenderness, lesion, or skin changes  Inguinal canal negative for hernia and adenopathy bilaterally   Urethral meatus and urethra are normal  Vagina without erythema, lesions, tenderness, or foreign body in the vagina  No unusual vaginal discharge   Cervix is parous, without discharge or lesion  No CMT or friability  Uterus is mobile, nontender without palpable mass  Adnexa are nontender, no fullness, and without palpable mass bilaterally

## 2022-05-16 ENCOUNTER — OFFICE VISIT (OUTPATIENT)
Dept: OBGYN CLINIC | Age: 32
End: 2022-05-16
Payer: COMMERCIAL

## 2022-05-16 VITALS
WEIGHT: 164 LBS | DIASTOLIC BLOOD PRESSURE: 80 MMHG | BODY MASS INDEX: 24.86 KG/M2 | HEIGHT: 68 IN | SYSTOLIC BLOOD PRESSURE: 122 MMHG

## 2022-05-16 DIAGNOSIS — Z12.4 ENCOUNTER FOR SCREENING FOR CERVICAL CANCER: ICD-10-CM

## 2022-05-16 DIAGNOSIS — Z01.419 ENCOUNTER FOR ANNUAL ROUTINE GYNECOLOGICAL EXAMINATION: Primary | ICD-10-CM

## 2022-05-16 DIAGNOSIS — Z11.3 SCREENING FOR STD (SEXUALLY TRANSMITTED DISEASE): ICD-10-CM

## 2022-05-16 PROCEDURE — 99385 PREV VISIT NEW AGE 18-39: CPT

## 2022-05-16 PROCEDURE — G0476 HPV COMBO ASSAY CA SCREEN: HCPCS

## 2022-05-16 PROCEDURE — G0145 SCR C/V CYTO,THINLAYER,RESCR: HCPCS

## 2022-05-16 PROCEDURE — 87591 N.GONORRHOEAE DNA AMP PROB: CPT

## 2022-05-16 PROCEDURE — 87491 CHLMYD TRACH DNA AMP PROBE: CPT

## 2022-05-16 NOTE — PROGRESS NOTES
89 Patient is here today for a gynecological annual exam    No questions or concerns today  LMP: 2022  Menarche age: 13    BC: Would like mirena     Last pap: Not on file  Hx of abnormal paps? YES     UTD with Covid vaccine

## 2022-05-16 NOTE — PATIENT INSTRUCTIONS
Intrauterine Device   AMBULATORY CARE:   An IUD  is a type of birth control that is inserted into your uterus  It is a small, flexible piece of plastic with a string on the end  It is inserted and removed by your healthcare provider  IUDs prevent sperm from reaching or fertilizing an egg  IUDs also prevent a fertilized egg from attaching to the uterus and developing into a fetus  Common types of IUDs:  Your healthcare provider will recommend the type of IUD that is right for you  This is based on your age and if you have had a child  If you have not had a child, a smaller IUD will be used  A copper IUD  slowly releases a small amount of copper into your uterus  This IUD can remain in place for up to 10 years  A hormone-releasing IUD  slowly releases a small amount of progesterone into your uterus  Progesterone is a hormone that is made by your body to help control your periods  This IUD can remain in place for 3 to 5 years  Seek care immediately if:   You have severe pain or bleeding during your period  You have a fever and severe abdominal pain  Call your doctor or gynecologist if:   You think you are pregnant  The IUD has come out  You have bleeding from your vagina after you have sex, and it is not your period  You have pain during sex  You cannot feel the IUD string, the string feels longer, or you feel the plastic of the IUD itself  You have vaginal discharge that is green, yellow, or has a foul odor  You have questions or concerns about your condition or care  Advantages of an IUD:   An IUD is 98% to 99% effective in preventing pregnancy  The IUD can be removed by your healthcare provider if you decide to have a baby  You may be able to get pregnant as soon as the IUD is removed  An IUD protects you from pregnancy right after it is inserted  You do not have to stop sexual activity to insert it   You do not have to remember to take your birth control pill     Copper IUDs are safer for some women than oral birth control pills  Examples include women who smoke or have a history of blood clots  Hormone-releasing IUDs may decrease certain health problems  Examples include bleeding and cramping that happen with your monthly period  Disadvantages of an IUD:   There is a small chance that you could get pregnant  Sometimes the IUD cannot be removed after you get pregnant  This increases your risk of a miscarriage or an ectopic pregnancy  Ectopic pregnancy is when the fertilized egg starts to grow somewhere other than your uterus  An IUD does not protect you from sexually transmitted infections  You may have cramps during the first weeks after you get the IUD  A copper IUD may cause your monthly period to be heavier or more painful  This is more common within the first 3 months after you get the IUD  You may need to have your IUD removed if your bleeding or pain becomes severe  You may have spotting between periods  There is a small risk of an infection within the first 20 days after the IUD is placed  Infection can lead to pelvic inflammatory disease  This can cause infertility  Your uterus may tear when the IUD is inserted  The IUD may slip part or all of the way out of your uterus  Self-care:   NSAIDs , such as ibuprofen, help decrease swelling, pain, and fever  This medicine is available with or without a doctor's order  NSAIDs can cause stomach bleeding or kidney problems in certain people  If you take blood thinner medicine, always ask if NSAIDs are safe for you  Always read the medicine label and follow directions  Apply heat to relieve pain and cramping  Use a heating pad set on low  Apply heat to your lower abdomen for 20 minutes every hour, or as directed  Return to activities as directed  Your healthcare provider will tell you when it is okay to return to work, school, or other activities      Do not use a tampon or have sex until your provider says it is okay  Make sure your IUD is in place: An IUD has a string that is made of plastic thread  One to 2 inches of this string hangs into your vagina  You cannot see this string, and it should not cause problems when you have sex  Check your IUD string every 3 days for the first 3 months that you have your IUD  After that, check the string after each monthly period  Do the following to check the placement of your IUD:  Wash your hands with soap and warm water  Dry them with a clean towel  Bend your knees and squat low to the ground  Gently put your index finger inside your vagina  The cervix is at the top of the vagina and feels like the tip of your nose  Feel for the IUD string  Do not pull on the string  You should not be able to feel the firm plastic of the IUD itself  Wash your hands after you check your IUD string  For more information:   [facility] [address] [place]  Phone: 0- 587 - [phone]  Web Address: https://[employer]/  org    Follow up with your doctor as directed:  Write down your questions so you remember to ask them during your visits  © Copyright [employer] 2022 Information is for End User's use only and may not be sold, redistributed or otherwise used for commercial purposes  All illustrations and images included in CareNotes® are the copyrighted property of [employer]  or [facility] [place]   The above information is an  only  It is not intended as medical advice for individual conditions or treatments  Talk to your doctor, nurse or pharmacist before following any medical regimen to see if it is safe and effective for you  HPV (Human Papillomavirus)   AMBULATORY CARE:   Human Papillomavirus (HPV)  is the most common infection spread by sexual contact  It can also be spread from a mother to her baby during delivery   HPV may cause oral and genital warts or tumors in your nose, mouth, throat, and lungs  HPV may also cause vaginal, penile, and anal cancers  You may not show symptoms of any of these conditions for several years after being exposed to HPV  Common symptoms include the following:   Painless warts    Genital or anal discharge, bleeding, itching, or pain    Pain when you urinate    Call your doctor if:   You have warts in your genital or anal area  You have genital or anal discharge, bleeding, itching, or pain  You have pain when you urinate  You have questions or concerns about your condition or care  HPV diagnosis:  Your healthcare provider may use a vinegar liquid to help diagnose HPV genital warts  Women 27to 72years old can be checked for HPV during regular cervical cancer screenings  An HPV test checks for certain types of HPV that can cause changes in cervical cells  Without treatment, the changed cells can become cancer  An HPV test can be done every 5 years if the results show no infection  The test can be done with or without a Pap smear  A Pap smear checks for cancer or for abnormal cells that can become cancer  You may be tested for HPV if you are diagnosed with a mouth or throat cancer  Treatment:  HPV cannot be cured  Conditions caused by HPV can be treated  You will need to be monitored closely for these conditions  Ask your healthcare provider for more information about monitoring, conditions caused by HPV, and available treatments  Prevent HPV infection:   Ask about the HPV vaccine  The vaccine can help protect against HPV infection  Females and males can receive the vaccine  It is most effective if given before sexual activity begins  This allows the body to build almost complete protection against HPV before contact with the virus  The vaccine is usually given at 6or 15years of age but may be given as early as 5 years  The vaccine can be given through age 39  Always use a condom during intercourse    A condom will not completely protect you from HPV infection, but it will help lower your risk  Use a new condom or latex barrier each time you have sex  This includes oral, vaginal, and anal sex  Make sure the condom fits and is put on correctly  Rubber latex sheets or dental dams can be used for oral sex  If you are allergic to latex, use a nonlatex product such as polyurethane  Follow up with your healthcare provider as directed:  Write down your questions so you remember to ask them during your visits  © Copyright [employer] 2022 Information is for End User's use only and may not be sold, redistributed or otherwise used for commercial purposes  All illustrations and images included in CareNotes® are the copyrighted property of [employer]  or [facility] [place]   The above information is an  only  It is not intended as medical advice for individual conditions or treatments  Talk to your doctor, nurse or pharmacist before following any medical regimen to see if it is safe and effective for you

## 2022-05-17 LAB
HPV HR 12 DNA CVX QL NAA+PROBE: NEGATIVE
HPV16 DNA CVX QL NAA+PROBE: NEGATIVE
HPV18 DNA CVX QL NAA+PROBE: NEGATIVE

## 2022-05-18 LAB
C TRACH DNA SPEC QL NAA+PROBE: NEGATIVE
N GONORRHOEA DNA SPEC QL NAA+PROBE: NEGATIVE

## 2022-05-23 ENCOUNTER — TELEPHONE (OUTPATIENT)
Dept: OBGYN CLINIC | Facility: CLINIC | Age: 32
End: 2022-05-23

## 2022-05-23 ENCOUNTER — PROCEDURE VISIT (OUTPATIENT)
Dept: OBGYN CLINIC | Facility: CLINIC | Age: 32
End: 2022-05-23
Payer: COMMERCIAL

## 2022-05-23 VITALS
DIASTOLIC BLOOD PRESSURE: 80 MMHG | SYSTOLIC BLOOD PRESSURE: 130 MMHG | WEIGHT: 165 LBS | HEIGHT: 68 IN | BODY MASS INDEX: 25.01 KG/M2

## 2022-05-23 DIAGNOSIS — B96.89 BV (BACTERIAL VAGINOSIS): Primary | ICD-10-CM

## 2022-05-23 DIAGNOSIS — Z30.430 ENCOUNTER FOR IUD INSERTION: Primary | ICD-10-CM

## 2022-05-23 DIAGNOSIS — Z32.02 NEGATIVE PREGNANCY TEST: ICD-10-CM

## 2022-05-23 DIAGNOSIS — N76.0 BV (BACTERIAL VAGINOSIS): Primary | ICD-10-CM

## 2022-05-23 LAB
LAB AP GYN PRIMARY INTERPRETATION: NORMAL
Lab: NORMAL
PATH INTERP SPEC-IMP: NORMAL
SL AMB POCT URINE HCG: NORMAL

## 2022-05-23 PROCEDURE — 58300 INSERT INTRAUTERINE DEVICE: CPT | Performed by: NURSE PRACTITIONER

## 2022-05-23 PROCEDURE — 81025 URINE PREGNANCY TEST: CPT | Performed by: NURSE PRACTITIONER

## 2022-05-23 RX ORDER — METRONIDAZOLE 7.5 MG/G
GEL TOPICAL
Qty: 45 G | Refills: 0 | Status: SHIPPED | OUTPATIENT
Start: 2022-05-23

## 2022-05-23 NOTE — TELEPHONE ENCOUNTER
Franco Lyn,    I spoke with pt and she is experiencing sporadic itching, extra discharge and does notice an odor  Can you send something over for her? Thanks!

## 2022-05-23 NOTE — TELEPHONE ENCOUNTER
----- Message from Chelsie Haynes Aleah  sent at 5/23/2022 12:47 PM EDT -----  Please let Bandar Huff know her GC/CT and HPV tests were negative  Her pap was also normal but suggest BV  If she is having any issues with discharge, itching or odor, I can send a script over to her pharmacy  Thanks!

## 2022-05-23 NOTE — PATIENT INSTRUCTIONS
Levonorgestrel (Into the uterus)   Levonorgestrel ([id number])  Prevents pregnancy and treats heavy menstrual bleeding  This is an intrauterine device (IUD), which is a reversible form of birth control  This IUD slowly releases levonorgestrel, a hormone  Brand Name(s): Lugene Sole, Mirena, [place]   There may be other brand names for this medicine  When This Medicine Should Not Be Used: This device is not right for everyone  Do not use it if you had an allergic reaction to levonorgestrel, silicone, polyethylene, silica, barium sulfate or iron oxide, or if you are pregnant  How to Use This Medicine:   Device  A nurse or other trained health professional will give you this medicine  The IUD is usually inserted by your doctor during your monthly period  You will need to see your doctor 4 to 6 weeks after the IUD is placed and then once a year  Your IUD has a string or "tail" that is made of plastic thread  About one or two inches of this string hangs into your vagina  You cannot see this string, and it will not cause problems when you have sex  Check your IUD after each monthly period  You may not be protected against pregnancy if you cannot feel the string or if you feel plastic  Do the following to check the placement of your IUD:  Wash your hands with soap and warm water  Dry them with a clean towel  Bend your knees and squat low to the ground  Gently put your index finger high inside your vagina  The cervix is at the top of the vagina  Find the IUD string coming from your cervix  Never pull on the string  You should not be able to feel the plastic of the IUD itself  Wash your hands after you are done checking your IUD string  Your doctor will need to remove your IUD after 3 years for Juanito Yun, after 5 years for [facility], after 6 years for Unique Jernigan, or after 7 years for Mirena®  You will also need to have it replaced if it comes out of your uterus   If you are using Mirena® or Liletta® and want to stop, your doctor can remove it at any time  However, you may become pregnant as soon as Cristine Eisenmenger, Mirena®, or [address] is removed or if you have intercourse the week before Evangelista Goldberg is removed  Use another form of birth control or have a new IUD inserted to keep from getting pregnant  Drugs and Foods to Avoid:   Ask your doctor or pharmacist before using any other medicine, including over-the-counter medicines, vitamins, and herbal products  Some medicines can affect how this device works  Tell your doctor if you are using a blood thinner (including warfarin)  Warnings While Using This Medicine:   Tell your doctor if you have had any problems, infections, or other conditions that affected your reproductive system  There are many problems that could make an IUD a bad choice for you, including if you have fibroids, unexplained bleeding, a uterus that has an unusual shape, a recent infection, a history of pelvic inflammatory disease, an abnormal Pap test, ectopic pregnancy, cancer or suspected cancer, or an existing IUD  Tell your doctor if you are breastfeeding, or if you had a baby, miscarriage, or  in the past 3 months  Tell your doctor if you have liver disease (including tumor or cancer), heart disease, breast cancer, heart or blood circulation problems, migraine, high blood pressure, or a history of heart valve problems, blood clotting problems, stroke, or heart attack  Tell your doctor if you have problems with your immune system or have had surgery on your female organs (especially fallopian tubes)  There is a small chance that you could get pregnant when using an IUD, just as there is with any birth control  If you get pregnant, your doctor may remove your IUD to lower the risk of miscarriage or other problems    This medicine may cause the following problems:  Increased risk of ectopic pregnancy (pregnancy outside the uterus)  Increased risk of serious infections, including sepsis  Increased risk of pelvic inflammatory disease (PID) or endometritis  Perforation (hole in the wall of your uterus), which can damage other organs  Increased risk for ovarian cysts  Increased risk of cancer of the breast, uterus, or cervix  Increased risk of high blood pressure, stroke, heart attack, or clotting problems  Jaundice (yellow skin or eyes)  You might have some spotting and cramping during the first weeks after the IUD has been inserted  These symptoms should decrease or go away within a few weeks up to 6 months  You could have less bleeding or even stop having periods by the end of the first year  Call your doctor if you have a change from your regular bleeding pattern after you have had your IUD for awhile, including more bleeding or if you miss a period (and you were having periods even with your IUD)  An IUD can slip partly or all the way out of your uterus  If this happens, use condoms or another form of birth control, and call your doctor right away  This IUD will not protect you from HIV/AIDS or other sexually transmitted diseases  If you have the [address] [place] [address] or [facility], tell your doctor before you have an MRI test   Your doctor will check your progress and the effects of this medicine at regular visits  Keep all appointments  Possible Side Effects While Using This Medicine:   Call your doctor right away if you notice any of these side effects:   Allergic reaction: Itching or hives, swelling in your face or hands, swelling or tingling in your mouth or throat, chest tightness, trouble breathing  Bloating, stomach or pelvic pain, spasm, tenderness, or cramping that is sudden or severe  Chest pain, problems with speech or walking, numbness or weakness in your arm or leg or on one side of your body  Heavy bleeding from your vagina  Pain during sex, or if your partner feels the hard plastic of the IUD during sex  Severe headache, vision changes  Unusual bleeding, bruising, or weakness  Vaginal discharge that has a bad smell, fever, chills, sores on your genitals  Yellow skin or eyes  If you notice these less serious side effects, talk with your doctor:   Acne, dandruff, oily skin or other skin changes  Breast pain or discomfort  Change in bleeding pattern after the first few months  Dizziness or lightheadedness after IUD is placed  Mild itching around your vagina and genitals  Weight gain  If you notice other side effects that you think are caused by this medicine, tell your doctor  Call your doctor for medical advice about side effects  You may report side effects to FDA at 2-264-FDA-2244    © Copyright [employer] 2022 Information is for End User's use only and may not be sold, redistributed or otherwise used for commercial purposes  The above information is an  only  It is not intended as medical advice for individual conditions or treatments  Talk to your doctor, nurse or pharmacist before following any medical regimen to see if it is safe and effective for you

## 2022-05-23 NOTE — PROGRESS NOTES
Iud insertions    Date/Time: 5/23/2022 2:23 PM  Performed by: LUDIVINA Solomon  Authorized by: LUDIVINA Solomon   Universal Protocol:  Consent: Verbal consent obtained  Risks and benefits: risks, benefits and alternatives were discussed  Consent given by: patient  Timeout called at: 5/23/2022 2:13 PM   Patient understanding: patient states understanding of the procedure being performed  Patient consent: the patient's understanding of the procedure matches consent given  Procedure consent: procedure consent matches procedure scheduled  Relevant documents: relevant documents present and verified  Test results: test results available and properly labeled  Required items: required blood products, implants, devices, and special equipment available  Patient identity confirmed: verbally with patient        Procedure:     Pelvic exam performed: yes      Negative GC/chlamydia test: yes (5/2022)      Negative urine pregnancy test: yes (Pt states she is abstinent since her LMP )      Cervix cleaned and prepped: yes      Speculum placed in vagina: yes      Tenaculum applied to cervix: yes      Uterus sounded: yes      Uterus sound depth (cm):  7 5    IUD inserted with no complications: yes      IUD type:  Mirena    Strings trimmed: yes    Post-procedure:     Patient tolerated procedure well: yes    Comments:      Return in 3 months for menses recheck  All questions answered

## 2022-06-29 ENCOUNTER — OFFICE VISIT (OUTPATIENT)
Dept: GASTROENTEROLOGY | Facility: CLINIC | Age: 32
End: 2022-06-29
Payer: COMMERCIAL

## 2022-06-29 VITALS
SYSTOLIC BLOOD PRESSURE: 140 MMHG | HEART RATE: 100 BPM | HEIGHT: 68 IN | DIASTOLIC BLOOD PRESSURE: 82 MMHG | BODY MASS INDEX: 25.7 KG/M2 | WEIGHT: 169.6 LBS

## 2022-06-29 DIAGNOSIS — A09 DIARRHEA OF INFECTIOUS ORIGIN: ICD-10-CM

## 2022-06-29 DIAGNOSIS — K51.919 ULCERATIVE COLITIS WITH COMPLICATION, UNSPECIFIED LOCATION (HCC): ICD-10-CM

## 2022-06-29 DIAGNOSIS — R79.89 LOW TSH LEVEL: Primary | ICD-10-CM

## 2022-06-29 PROCEDURE — 99214 OFFICE O/P EST MOD 30 MIN: CPT | Performed by: PHYSICIAN ASSISTANT

## 2022-06-29 RX ORDER — ERGOCALCIFEROL 1.25 MG/1
CAPSULE ORAL
COMMUNITY
Start: 2022-06-24

## 2022-06-29 NOTE — PROGRESS NOTES
[facility]  Early Hanna 27 y o  female MRN: [id number]       CC: Follow-up, diarrhea x 1 week    HPI:  Mary Luis is a 26-year-old female who was diagnosed with ulcerative colitis in 2012  She was here to establish with GI in April  She also has history of latent TB status post treatment with isoniazid per the patient  She had EGD and colonoscopy in May with Dr Glendy Lofton  EGD was normal   Colonoscopy was also normal up to the terminal ileum  Patient reports for the past week, she has been having diarrhea and fatigue  Earlier this week she did have take out steak dinner that she feels did not taste right to her  She has been having increased diarrhea with streaks of blood  During medication review, I noticed that her TSH was very low after she had follow-up with me through her PCP Dr Delgado Zamorano  He instructed her to find an endocrinologist, she reports that she has tried but the initial appointment is 3 months out  Review of Systems:    CONSTITUTIONAL:  Positive for fatigue  HEENT: No earache or tinnitus  Denies hearing loss or visual disturbances  CARDIOVASCULAR: No chest pain or palpitations  RESPIRATORY: Denies any cough, hemoptysis, shortness of breath or dyspnea on exertion  GASTROINTESTINAL: As noted in the History of Present Illness  GENITOURINARY: No problems with urination  Denies any hematuria or dysuria  NEUROLOGIC: No dizziness or vertigo, denies headaches  MUSCULOSKELETAL: Denies any muscle or joint pain  SKIN: Denies skin rashes or itching  ENDOCRINE: Denies excessive thirst  Denies intolerance to heat or cold  PSYCHOSOCIAL:  Positive for mood changes        Current Outpatient Medications   Medication Sig Dispense Refill    dicyclomine (BENTYL) 20 mg tablet Take 1 tablet (20 mg total) by mouth every 6 (six) hours as needed (abdominal cramping) 90 tablet 2    ergocalciferol (VITAMIN D2) 50,000 units       mesalamine (ASACOL) 800 MG EC tablet Take 800 mg by mouth in the morning and 800 mg at noon and 800 mg in the evening   mesalamine (LIALDA) 1 2 g EC tablet Take 2 tablets (2 4 g total) by mouth daily with breakfast 180 tablet 4    metroNIDAZOLE (METROGEL) 0 75 % gel Insert 1 applicator (5g) in the vagina once daily for 5 days 45 g 0    ondansetron (ZOFRAN-ODT) 4 mg disintegrating tablet Take 1 tablet (4 mg total) by mouth every 6 (six) hours as needed for nausea 20 tablet 0    ondansetron (Zofran ODT) 4 mg disintegrating tablet Take 1 tablet (4 mg total) by mouth every 6 (six) hours as needed for nausea or vomiting (Patient not taking: Reported on 6/29/2022) 45 tablet 2     No current facility-administered medications for this visit  Past Medical History:   Diagnosis Date    Hypertension     Hypothyroid     Ulcerative colitis ([facility] [address] )      Past Surgical History:   Procedure Laterality Date    APPENDECTOMY       Social History     Socioeconomic History    Marital status: Single     Spouse name: None    Number of children: None    Years of education: None    Highest education level: None   Occupational History    None   Tobacco Use    Smoking status: Former Smoker    Smokeless tobacco: Never Used   Vaping Use    Vaping Use: Never used   Substance and Sexual Activity    Alcohol use: Yes     Comment: rare    Drug use: Not Currently     Types: Marijuana    Sexual activity: Yes     Partners: Male   Other Topics Concern    None   Social History Narrative    None     Social Determinants of Health     Financial Resource Strain: Not on file   Food Insecurity: Not on file   Transportation Needs: Not on file   Physical Activity: Not on file   Stress: Not on file   Social Connections: Not on file   Intimate Partner Violence: Not on file   Housing Stability: Not on file     History reviewed  No pertinent family history           PHYSICAL EXAM:    Vitals:    06/29/22 1412   BP: 140/82   Pulse: 100   Weight: 76 9 kg (169 lb 9 6 oz)   Height: 5' 8" (1 727 m) General Appearance:   Alert and oriented x 3  Cooperative, and in no respiratory distress   HEENT:   Normocephalic, atraumatic, anicteric      Neck:  Supple, symmetrical, trachea midline   Lungs:   Clear to auscultation bilaterally    Heart[de-identified]   Regular rate and rhythm   Abdomen:   Soft, non-tender, non-distended; normal bowel sounds; no masses, no organomegaly    Genitalia:   Deferred    Rectal:   Deferred    Extremities:  No cyanosis, clubbing or edema    Pulses:  2+ and symmetric all extremities    Skin:  Skin color, texture, turgor normal, no rashes or lesions    Lymph nodes:  No palpable cervical or supraclavicular lymphadenopathy        Lab Results:             Invalid input(s): LABALBU            Imaging Studies: I have personally reviewed pertinent imaging studies  EGD    Result Date: 5/12/2022  Impression: Normal endoscopy RECOMMENDATION: Await pathology Reflux precautions   Rey Fitzpatrick MD     Colonoscopy    Result Date: 5/12/2022  Impression: Normal colon and terminal ileum No active ulcerative colitis RECOMMENDATION: Repeat colonoscopy in 5 yearsdue to inflammatory bowel disease Lialda 2 pills daily Will give Xifaxan course for treatment of IBS D  Rey Fitzpatrick MD       ASSESSMENT and PLAN:      1) Low TSH, fatigue, diarrhea - Upon lab review, she has a TSH of over 0 01 in April  I am concerned this is causing her symptoms globally  She is also at increased risk of infectious pathogens secondary to history of IBD and recent food exposure  - Recheck TSH and free T4  - Stool studies ordered  - She will call her insurance company to see who else is in her network for endocrinology  - Follow-up with PCP  - We can also try calling through our office for a consultation     2) UC - Normal colonoscopy  Recall in 5 years  Continue Lialda  Follow up after labs

## 2022-06-29 NOTE — LETTER
June 29, 2022     Elisa Signs  [address] [place] [address]    Patient: Elisa Roth   YOB: 1990   Date of Visit: 6/29/2022     To whom it may concern,    Ms Matthew Jefferson is under myself and Dr Julian Kraus care for chronic GI condition  She needs to have testing this week and time off  She may return to work on July 5th  If you gave any questions, our number is [phone]         Sincerely,        Elana Gomes PA-C        CC: No Recipients

## 2022-06-30 ENCOUNTER — APPOINTMENT (OUTPATIENT)
Dept: LAB | Facility: CLINIC | Age: 32
End: 2022-06-30
Payer: COMMERCIAL

## 2022-06-30 DIAGNOSIS — A09 DIARRHEA OF INFECTIOUS ORIGIN: ICD-10-CM

## 2022-06-30 DIAGNOSIS — R79.89 LOW TSH LEVEL: ICD-10-CM

## 2022-06-30 DIAGNOSIS — K51.919 ULCERATIVE COLITIS WITH COMPLICATION, UNSPECIFIED LOCATION (HCC): ICD-10-CM

## 2022-06-30 LAB
25(OH)D3 SERPL-MCNC: 23.9 NG/ML (ref 30–100)
CRP SERPL QL: <3 MG/L
FERRITIN SERPL-MCNC: 88 NG/ML (ref 8–388)
HAV IGM SER QL: NORMAL
HBV CORE IGM SER QL: NORMAL
HBV SURFACE AG SER QL: NORMAL
HCV AB SER QL: NORMAL
IRON SATN MFR SERPL: 26 % (ref 15–50)
IRON SERPL-MCNC: 90 UG/DL (ref 50–170)
T4 FREE SERPL-MCNC: 1.89 NG/DL (ref 0.76–1.46)
TIBC SERPL-MCNC: 343 UG/DL (ref 250–450)
TSH SERPL DL<=0.05 MIU/L-ACNC: <0.007 UIU/ML (ref 0.45–4.5)

## 2022-06-30 PROCEDURE — 84439 ASSAY OF FREE THYROXINE: CPT

## 2022-06-30 PROCEDURE — 83550 IRON BINDING TEST: CPT

## 2022-06-30 PROCEDURE — 84443 ASSAY THYROID STIM HORMONE: CPT

## 2022-06-30 PROCEDURE — 82728 ASSAY OF FERRITIN: CPT

## 2022-06-30 PROCEDURE — 36415 COLL VENOUS BLD VENIPUNCTURE: CPT

## 2022-06-30 PROCEDURE — 86140 C-REACTIVE PROTEIN: CPT

## 2022-06-30 PROCEDURE — 80074 ACUTE HEPATITIS PANEL: CPT

## 2022-06-30 PROCEDURE — 83540 ASSAY OF IRON: CPT

## 2022-06-30 PROCEDURE — 87505 NFCT AGENT DETECTION GI: CPT

## 2022-06-30 PROCEDURE — 82306 VITAMIN D 25 HYDROXY: CPT

## 2022-07-01 ENCOUNTER — TELEPHONE (OUTPATIENT)
Dept: GASTROENTEROLOGY | Facility: CLINIC | Age: 32
End: 2022-07-01

## 2022-07-01 DIAGNOSIS — E05.90 HYPERTHYROIDISM: Primary | ICD-10-CM

## 2022-07-01 LAB
C DIFF TOX GENS STL QL NAA+PROBE: NEGATIVE
CAMPYLOBACTER DNA SPEC NAA+PROBE: NORMAL
SALMONELLA DNA SPEC QL NAA+PROBE: NORMAL
SHIGA TOXIN STX GENE SPEC NAA+PROBE: NORMAL
SHIGELLA DNA SPEC QL NAA+PROBE: NORMAL

## 2022-07-01 NOTE — TELEPHONE ENCOUNTER
Called pt and LMOM- she does have hypothyroidism and that PA this that's what is causing the diarrhea and not her UC  C diff labs came negative and to take imodium for her diarrhea as per Kerri Knowles

## 2022-07-01 NOTE — TELEPHONE ENCOUNTER
----- Message from Poppy Frausto PA-C sent at 7/1/2022 10:44 AM EDT -----  Please call patient let her know that she does have hyperthyroidism, and I think that this is what is causing her diarrhea rather than her ulcerative colitis  We will wait for her stool studies to come back as well  Can someone call Endocrinology, and try and get her an appointment? Patient had told me that her PCP did not want to treat her and referred her to endocrinology a couple of months ago

## 2022-07-05 ENCOUNTER — HOSPITAL ENCOUNTER (EMERGENCY)
Facility: HOSPITAL | Age: 32
Discharge: HOME | End: 2022-07-06
Attending: EMERGENCY MEDICINE | Admitting: EMERGENCY MEDICINE
Payer: COMMERCIAL

## 2022-07-05 ENCOUNTER — APPOINTMENT (EMERGENCY)
Dept: ULTRASOUND IMAGING | Facility: HOSPITAL | Age: 32
End: 2022-07-05
Payer: COMMERCIAL

## 2022-07-05 VITALS
SYSTOLIC BLOOD PRESSURE: 141 MMHG | HEART RATE: 85 BPM | OXYGEN SATURATION: 100 % | DIASTOLIC BLOOD PRESSURE: 65 MMHG | RESPIRATION RATE: 18 BRPM | TEMPERATURE: 98.8 F

## 2022-07-05 DIAGNOSIS — Z34.90 INTRAUTERINE PREGNANCY: Primary | ICD-10-CM

## 2022-07-05 LAB
ANION GAP SERPL CALCULATED.3IONS-SCNC: 12 MMOL/L (ref 4–13)
B-HCG SERPL-ACNC: ABNORMAL MIU/ML
BACTERIA UR QL AUTO: ABNORMAL /HPF
BASOPHILS # BLD AUTO: 0.03 THOUSANDS/ΜL (ref 0–0.1)
BASOPHILS NFR BLD AUTO: 0 % (ref 0–1)
BILIRUB UR QL STRIP: NEGATIVE
BUN SERPL-MCNC: 7 MG/DL (ref 5–25)
CALCIUM SERPL-MCNC: 9.5 MG/DL (ref 8.3–10.1)
CHLORIDE SERPL-SCNC: 102 MMOL/L (ref 100–108)
CLARITY UR: ABNORMAL
CO2 SERPL-SCNC: 22 MMOL/L (ref 21–32)
COLOR UR: YELLOW
CREAT SERPL-MCNC: 0.46 MG/DL (ref 0.6–1.3)
EOSINOPHIL # BLD AUTO: 0.26 THOUSAND/ΜL (ref 0–0.61)
EOSINOPHIL NFR BLD AUTO: 2 % (ref 0–6)
ERYTHROCYTE [DISTWIDTH] IN BLOOD BY AUTOMATED COUNT: 13.5 % (ref 11.6–15.1)
GFR SERPL CREATININE-BSD FRML MDRD: 130 ML/MIN/1.73SQ M
GLUCOSE SERPL-MCNC: 81 MG/DL (ref 65–140)
GLUCOSE UR STRIP-MCNC: NEGATIVE MG/DL
HCG SERPL QL: POSITIVE
HCT VFR BLD AUTO: 39.2 % (ref 34.8–46.1)
HGB BLD-MCNC: 13 G/DL (ref 11.5–15.4)
HGB UR QL STRIP.AUTO: ABNORMAL
IMM GRANULOCYTES # BLD AUTO: 0.04 THOUSAND/UL (ref 0–0.2)
IMM GRANULOCYTES NFR BLD AUTO: 0 % (ref 0–2)
KETONES UR STRIP-MCNC: NEGATIVE MG/DL
LEUKOCYTE ESTERASE UR QL STRIP: ABNORMAL
LYMPHOCYTES # BLD AUTO: 2.59 THOUSANDS/ΜL (ref 0.6–4.47)
LYMPHOCYTES NFR BLD AUTO: 21 % (ref 14–44)
MAGNESIUM SERPL-MCNC: 2.3 MG/DL (ref 1.6–2.6)
MCH RBC QN AUTO: 29.7 PG (ref 26.8–34.3)
MCHC RBC AUTO-ENTMCNC: 33.2 G/DL (ref 31.4–37.4)
MCV RBC AUTO: 90 FL (ref 82–98)
MONOCYTES # BLD AUTO: 1.24 THOUSAND/ΜL (ref 0.17–1.22)
MONOCYTES NFR BLD AUTO: 10 % (ref 4–12)
MUCOUS THREADS UR QL AUTO: ABNORMAL
NEUTROPHILS # BLD AUTO: 8.26 THOUSANDS/ΜL (ref 1.85–7.62)
NEUTS SEG NFR BLD AUTO: 67 % (ref 43–75)
NITRITE UR QL STRIP: NEGATIVE
NON-SQ EPI CELLS URNS QL MICRO: ABNORMAL /HPF
NRBC BLD AUTO-RTO: 0 /100 WBCS
PH UR STRIP.AUTO: 5.5 [PH]
PLATELET # BLD AUTO: 262 THOUSANDS/UL (ref 149–390)
PMV BLD AUTO: 10.2 FL (ref 8.9–12.7)
POTASSIUM SERPL-SCNC: 3.9 MMOL/L (ref 3.5–5.3)
PROT UR STRIP-MCNC: NEGATIVE MG/DL
RBC # BLD AUTO: 4.37 MILLION/UL (ref 3.81–5.12)
RBC #/AREA URNS AUTO: ABNORMAL /HPF
SODIUM SERPL-SCNC: 136 MMOL/L (ref 136–145)
SP GR UR STRIP.AUTO: >=1.03 (ref 1–1.03)
UROBILINOGEN UR QL STRIP.AUTO: 0.2 E.U./DL
WBC # BLD AUTO: 12.42 THOUSAND/UL (ref 4.31–10.16)
WBC #/AREA URNS AUTO: ABNORMAL /HPF

## 2022-07-05 PROCEDURE — 84703 CHORIONIC GONADOTROPIN ASSAY: CPT | Performed by: EMERGENCY MEDICINE

## 2022-07-05 PROCEDURE — 99284 EMERGENCY DEPT VISIT MOD MDM: CPT | Performed by: EMERGENCY MEDICINE

## 2022-07-05 PROCEDURE — 36415 COLL VENOUS BLD VENIPUNCTURE: CPT | Performed by: EMERGENCY MEDICINE

## 2022-07-05 PROCEDURE — 99284 EMERGENCY DEPT VISIT MOD MDM: CPT

## 2022-07-05 PROCEDURE — 85025 COMPLETE CBC W/AUTO DIFF WBC: CPT | Performed by: EMERGENCY MEDICINE

## 2022-07-05 PROCEDURE — 84702 CHORIONIC GONADOTROPIN TEST: CPT | Performed by: EMERGENCY MEDICINE

## 2022-07-05 PROCEDURE — 81001 URINALYSIS AUTO W/SCOPE: CPT | Performed by: EMERGENCY MEDICINE

## 2022-07-05 PROCEDURE — 83735 ASSAY OF MAGNESIUM: CPT | Performed by: EMERGENCY MEDICINE

## 2022-07-05 PROCEDURE — 80048 BASIC METABOLIC PNL TOTAL CA: CPT | Performed by: EMERGENCY MEDICINE

## 2022-07-05 PROCEDURE — 76801 OB US < 14 WKS SINGLE FETUS: CPT

## 2022-07-05 PROCEDURE — 87086 URINE CULTURE/COLONY COUNT: CPT | Performed by: EMERGENCY MEDICINE

## 2022-07-06 ENCOUNTER — OFFICE VISIT (OUTPATIENT)
Dept: OBGYN CLINIC | Facility: CLINIC | Age: 32
End: 2022-07-06
Payer: COMMERCIAL

## 2022-07-06 ENCOUNTER — TELEPHONE (OUTPATIENT)
Dept: OBGYN CLINIC | Facility: CLINIC | Age: 32
End: 2022-07-06

## 2022-07-06 VITALS
DIASTOLIC BLOOD PRESSURE: 100 MMHG | SYSTOLIC BLOOD PRESSURE: 150 MMHG | BODY MASS INDEX: 25.73 KG/M2 | HEIGHT: 68 IN | WEIGHT: 169.8 LBS

## 2022-07-06 DIAGNOSIS — O26.30 PREGNANCY COMPLICATED BY INTRAUTERINE DEVICE (IUD): Primary | ICD-10-CM

## 2022-07-06 PROCEDURE — 99212 OFFICE O/P EST SF 10 MIN: CPT | Performed by: OBSTETRICS & GYNECOLOGY

## 2022-07-06 NOTE — TELEPHONE ENCOUNTER
----- Message from Raúl Malin MD sent at 7/5/2022 11:04 PM EDT -----  Regarding: er follow up - urgent  Pt needs OVS tomorrow 7/6 - with MD pref  Intrauterine Pregnancy with IUD in place

## 2022-07-06 NOTE — TELEPHONE ENCOUNTER
Pt called saying she went to the ER yesterday and she found out that she is pregnant  She was not expecting this since she has an IUD  She is very anxious and she is waiting for a phone call since that's what she was told while she was in the ER  Please advise! Thanks!

## 2022-07-06 NOTE — PROGRESS NOTES
Assessment/Plan:    Pregnancy complicated by intrauterine device (IUD)  Unable to palpate or see IUD strings  Patient has plans to make appointment for consultation with [facility] for further care  Diagnoses and all orders for this visit:    Pregnancy complicated by intrauterine device (IUD)          Subjective:      Patient ID: Papo Vera is a 27 y o  female  27 y o patient here today to follow up from ER visit  Patient had a positive pregnancy test and she have a IUD inserted  Patient stated she is not interested in keeping the pregnancy  The following portions of the patient's history were reviewed and updated as appropriate:   She  has a past medical history of Hypertension, Hypothyroid, and Ulcerative colitis ([facility] [address] )  She   Patient Active Problem List    Diagnosis Date Noted    Pregnancy complicated by intrauterine device (IUD) 07/06/2022    Hypertension     Ulcerative colitis ([facility] [address] )     Smoker      She  has a past surgical history that includes Appendectomy  Her family history is not on file  She  reports that she has quit smoking  She has never used smokeless tobacco  She reports current alcohol use  She reports previous drug use  Drug: Marijuana  Current Outpatient Medications   Medication Sig Dispense Refill    dicyclomine (BENTYL) 20 mg tablet Take 1 tablet (20 mg total) by mouth every 6 (six) hours as needed (abdominal cramping) 90 tablet 2    ergocalciferol (VITAMIN D2) 50,000 units       mesalamine (ASACOL) 800 MG EC tablet Take 800 mg by mouth in the morning and 800 mg at noon and 800 mg in the evening        mesalamine (LIALDA) 1 2 g EC tablet Take 2 tablets (2 4 g total) by mouth daily with breakfast 180 tablet 4    metroNIDAZOLE (METROGEL) 0 75 % gel Insert 1 applicator (5g) in the vagina once daily for 5 days 45 g 0    ondansetron (ZOFRAN-ODT) 4 mg disintegrating tablet Take 1 tablet (4 mg total) by mouth every 6 (six) hours as needed for nausea 20 tablet 0     No current facility-administered medications for this visit  Current Outpatient Medications on File Prior to Visit   Medication Sig    dicyclomine (BENTYL) 20 mg tablet Take 1 tablet (20 mg total) by mouth every 6 (six) hours as needed (abdominal cramping)    ergocalciferol (VITAMIN D2) 50,000 units     mesalamine (ASACOL) 800 MG EC tablet Take 800 mg by mouth in the morning and 800 mg at noon and 800 mg in the evening   mesalamine (LIALDA) 1 2 g EC tablet Take 2 tablets (2 4 g total) by mouth daily with breakfast    metroNIDAZOLE (METROGEL) 0 75 % gel Insert 1 applicator (5g) in the vagina once daily for 5 days    ondansetron (ZOFRAN-ODT) 4 mg disintegrating tablet Take 1 tablet (4 mg total) by mouth every 6 (six) hours as needed for nausea     No current facility-administered medications on file prior to visit  She is allergic to shellfish allergy - food allergy       Review of Systems      Objective:      /100 (BP Location: Right arm, Patient Position: Sitting)   Ht 5' 8" (1 727 m)   Wt 77 kg (169 lb 12 8 oz)   BMI 25 82 kg/m²          Physical Exam  Genitourinary:     Labia:         Right: No rash, tenderness or lesion  Left: No rash, tenderness or lesion  Vagina: Normal  No vaginal discharge, erythema or tenderness  Cervix: No cervical motion tenderness, discharge or friability  Uterus: Not deviated, not enlarged, not fixed and not tender  Adnexa:         Right: No mass, tenderness or fullness  Left: No mass, tenderness or fullness  Comments: String not visualized nor palpated     Unable to remove IUD

## 2022-07-06 NOTE — TELEPHONE ENCOUNTER
----- Message from Florencio Mathews MD sent at 7/5/2022 11:04 PM EDT -----  Regarding: er follow up - urgent  Pt needs OVS tomorrow 7/6 - with MD pref  Intrauterine Pregnancy with IUD in place

## 2022-07-06 NOTE — TELEPHONE ENCOUNTER
----- Message from Musa Bedolla MD sent at 7/5/2022 11:04 PM EDT -----  Regarding: er follow up - urgent  Pt needs OVS tomorrow 7/6 - with MD pref  Intrauterine Pregnancy with IUD in place

## 2022-07-06 NOTE — ED PROVIDER NOTES
History  Chief Complaint   Patient presents with    Abnormal Lab     Started with diarrhea last week, has UC  Saw GI and had labs done, reports was told her thyroid was "hyperactive " Was not started on any medications  26 y/o female, hx of UC, presents to the ED for generalized weakness, fatigue, and diarrhea  paitent states that she has not felt well in over 2 months  States that she has had intermittent LLQ abd pain over the last 2 months  States that the pain is typical of past UC flares  She states that she thought she was having a UC flare and recently saw her GI doctor who told her that her symptoms were not from UC  States that she had lab work done and was found that her thyroid was hyperactive  She has not been placed on any thyroid medications yet  States that her hair has been falling out  Denies any cp, sob, fever/ chills, urinary symptoms, or vaginal bleeding/discharge  States that she saw her OBGYN 2 months ago and had an IUD placed at that time  History provided by:  Patient      Prior to Admission Medications   Prescriptions Last Dose Informant Patient Reported? Taking?   dicyclomine (BENTYL) 20 mg tablet   No No   Sig: Take 1 tablet (20 mg total) by mouth every 6 (six) hours as needed (abdominal cramping)   ergocalciferol (VITAMIN D2) 50,000 units   Yes No   mesalamine (ASACOL) 800 MG EC tablet   Yes No   Sig: Take 800 mg by mouth in the morning and 800 mg at noon and 800 mg in the evening     mesalamine (LIALDA) 1 2 g EC tablet   No No   Sig: Take 2 tablets (2 4 g total) by mouth daily with breakfast   metroNIDAZOLE (METROGEL) 0 75 % gel   No No   Sig: Insert 1 applicator (5g) in the vagina once daily for 5 days   ondansetron (ZOFRAN-ODT) 4 mg disintegrating tablet   No No   Sig: Take 1 tablet (4 mg total) by mouth every 6 (six) hours as needed for nausea      Facility-Administered Medications: None       Past Medical History:   Diagnosis Date    Hypertension     Hypothyroid     Ulcerative colitis ([facility] [address] )        Past Surgical History:   Procedure Laterality Date    APPENDECTOMY         History reviewed  No pertinent family history  I have reviewed and agree with the history as documented  E-Cigarette/Vaping    E-Cigarette Use Never User      E-Cigarette/Vaping Substances     Social History     Tobacco Use    Smoking status: Former Smoker    Smokeless tobacco: Never Used   Vaping Use    Vaping Use: Never used   Substance Use Topics    Alcohol use: Yes     Comment: rare    Drug use: Not Currently     Types: Marijuana       Review of Systems   Constitutional: Positive for fatigue  Negative for chills and fever  HENT: Negative for congestion, ear pain and sore throat  Eyes: Negative for pain and visual disturbance  Respiratory: Negative for cough, shortness of breath and wheezing  Cardiovascular: Negative for chest pain and leg swelling  Gastrointestinal: Positive for abdominal pain and diarrhea  Negative for nausea and vomiting  Genitourinary: Negative for dysuria, frequency, hematuria and urgency  Musculoskeletal: Negative for neck pain and neck stiffness  Skin: Negative for rash and wound  Neurological: Negative for weakness, numbness and headaches  Psychiatric/Behavioral: Negative for agitation and confusion  All other systems reviewed and are negative  Physical Exam  Physical Exam  Vitals and nursing note reviewed  Constitutional:       Appearance: She is well-developed  HENT:      Head: Normocephalic and atraumatic  Eyes:      Pupils: Pupils are equal, round, and reactive to light  Cardiovascular:      Rate and Rhythm: Normal rate and regular rhythm  Pulmonary:      Effort: Pulmonary effort is normal       Breath sounds: Normal breath sounds  Abdominal:      General: Bowel sounds are normal       Palpations: Abdomen is soft  Tenderness: There is no abdominal tenderness  Musculoskeletal:         General: Normal range of motion  Cervical back: Normal range of motion and neck supple  Skin:     General: Skin is warm and dry  Neurological:      General: No focal deficit present  Mental Status: She is alert and oriented to person, place, and time        Comments: No focal deficits         Vital Signs  ED Triage Vitals [07/05/22 1815]   Temperature Pulse Respirations Blood Pressure SpO2   98 8 °F (37 1 °C) 86 18 (!) 182/89 100 %      Temp Source Heart Rate Source Patient Position - Orthostatic VS BP Location FiO2 (%)   Oral Monitor Sitting Left arm --      Pain Score       --           Vitals:    07/05/22 1815 07/05/22 2048 07/05/22 2252   BP: (!) 182/89 145/70 141/65   Pulse: 86 88 85   Patient Position - Orthostatic VS: Sitting Sitting Sitting         Visual Acuity      ED Medications  Medications - No data to display    Diagnostic Studies  Results Reviewed     Procedure Component Value Units Date/Time    hCG, quantitative [[id number]]  (Abnormal) Collected: 07/05/22 1927    Lab Status: Final result Specimen: Blood from Arm, Right Updated: 07/05/22 2130     HCG, Quant 70,378 mIU/mL     Narrative:       Expected Ranges:     Approximate               Approximate HCG  Gestation age          Concentration ( mIU/mL)  _____________          ______________________   [place]                      HCG values  0 2-1                       5-50  1-2                           2-3                         100-5000  3-4                         500-30431  4-5                         1000-01259  5-6                         [id number]  6-8                         88788-932360  8-12                        [id number]      Urine Microscopic [[id number]]  (Abnormal) Collected: 07/05/22 2044    Lab Status: Final result Specimen: Urine, Clean Catch Updated: 07/05/22 2122     RBC, UA 1-2 /hpf      WBC, UA 20-30 /hpf      Epithelial Cells Moderate /hpf      Bacteria, UA Occasional /hpf      MUCUS THREADS Occasional    Urine culture [[id number]] Collected: 07/05/22 2044    Lab Status:  In process Specimen: Urine, Clean Catch Updated: 07/05/22 2122    UA w Reflex to Microscopic w Reflex to Culture [[id number]]  (Abnormal) Collected: 07/05/22 2044    Lab Status: Final result Specimen: Urine, Clean Catch Updated: 07/05/22 2054     Color, UA Yellow     Clarity, UA Slightly Cloudy     Specific Gravity, UA >=1 030     pH, UA 5 5     Leukocytes, UA Small     Nitrite, UA Negative     Protein, UA Negative mg/dl      Glucose, UA Negative mg/dl      Ketones, UA Negative mg/dl      Urobilinogen, UA 0 2 E U /dl      Bilirubin, UA Negative     Occult Blood, UA Small    hCG, qualitative pregnancy [[id number]]  (Abnormal) Collected: 07/05/22 1927    Lab Status: Final result Specimen: Blood from Arm, Right Updated: 07/05/22 2026     Preg, Serum Positive    Basic metabolic panel [[id number]]  (Abnormal) Collected: 07/05/22 1927    Lab Status: Final result Specimen: Blood from Arm, Right Updated: 07/05/22 1946     Sodium 136 mmol/L      Potassium 3 9 mmol/L      Chloride 102 mmol/L      CO2 22 mmol/L      ANION GAP 12 mmol/L      BUN 7 mg/dL      Creatinine 0 46 mg/dL      Glucose 81 mg/dL      Calcium 9 5 mg/dL      eGFR 130 ml/min/1 73sq m     Narrative:      [place] guidelines for Chronic Kidney Disease (CKD):     Stage 1 with normal or high GFR (GFR > 90 mL/min/1 73 square meters)    Stage 2 Mild CKD (GFR = 60-89 mL/min/1 73 square meters)    Stage 3A Moderate CKD (GFR = 45-59 mL/min/1 73 square meters)    Stage 3B Moderate CKD (GFR = 30-44 mL/min/1 73 square meters)    Stage 4 Severe CKD (GFR = 15-29 mL/min/1 73 square meters)    Stage 5 End Stage CKD (GFR <15 mL/min/1 73 square meters)  Note: GFR calculation is accurate only with a steady state creatinine    Magnesium [[id number]]  (Normal) Collected: 07/05/22 1927    Lab Status: Final result Specimen: Blood from Arm, Right Updated: 07/05/22 1946     Magnesium 2 3 mg/dL     CBC and differential [[id number]]  (Abnormal) Collected: 22    Lab Status: Final result Specimen: Blood from Arm, Right Updated: 22     WBC 12 42 Thousand/uL      RBC 4 37 Million/uL      Hemoglobin 13 0 g/dL      Hematocrit 39 2 %      MCV 90 fL      MCH 29 7 pg      MCHC 33 2 g/dL      RDW 13 5 %      MPV 10 2 fL      Platelets 210 Thousands/uL      nRBC 0 /100 WBCs      Neutrophils Relative 67 %      Immat GRANS % 0 %      Lymphocytes Relative 21 %      Monocytes Relative 10 %      Eosinophils Relative 2 %      Basophils Relative 0 %      Neutrophils Absolute 8 26 Thousands/µL      Immature Grans Absolute 0 04 Thousand/uL      Lymphocytes Absolute 2 59 Thousands/µL      Monocytes Absolute 1 24 Thousand/µL      Eosinophils Absolute 0 26 Thousand/µL      Basophils Absolute 0 03 Thousands/µL                  US OB < 14 weeks with transvaginal   Final Result by Vipul Geiger MD ([id number])   An IUD seen within the endometrial cavity  Single live intrauterine gestation at 10 weeks 2 days  SARAH by a UA is 2023  Left ovary not visualized  Recommend gynecologic consultation  The study was marked in [facility] for immediate notification  Workstation performed: [id number]                    Procedures  Procedures         ED Course  ED Course as of 22   Tue  PREGNANCY, SERUM(!): Positive    HCG QUANTITATIVE(!): 68,545   1 Patient states that she has had  currently has an IUD which was placed in May    2307 Spoke with Dr Lakesha Spears from [facility] [place] states that the office will call her in the morning so that they can see her tomorrow for exam and counseling      Epithelial Cells(!): Moderate  Will await culture given contaminated sample                                              MDM  Number of Diagnoses or Management Options  Intrauterine pregnancy: new and requires workup  Diagnosis management comments: Patient with mutliple complaints- will get labs, hcg quant, and reassess for dispo  Labs reviewed and Free T4 just slightly elevated- will defer to PCP for treatment of thyroid  Patient reevaluated and feels improved  Patient updated on results of tests  Discharge instructions given including  follow-up, and return precautions  Patient demonstrates verbal understanding and agrees with plan  Amount and/or Complexity of Data Reviewed  Clinical lab tests: ordered and reviewed  Tests in the radiology section of CPT®: reviewed and ordered  Tests in the medicine section of CPT®: ordered and reviewed  Discussion of test results with the performing providers: yes  Decide to obtain previous medical records or to obtain history from someone other than the patient: yes  Obtain history from someone other than the patient: yes  Review and summarize past medical records: yes  Discuss the patient with other providers: yes  Independent visualization of images, tracings, or specimens: yes    Patient Progress  Patient progress: improved      Disposition  Final diagnoses:   Intrauterine pregnancy     Time reflects when diagnosis was documented in both MDM as applicable and the Disposition within this note     Time User Action Codes Description Comment    7/5/2022 11:59 PM Corene Cocking A Add [Z3A 10] 10 weeks gestation of pregnancy     7/5/2022 11:59 PM Corene Cocking A Remove [Z3A 10] 10 weeks gestation of pregnancy     7/5/2022 11:59 PM Corene Cocking A Add [Z34 90] Intrauterine pregnancy       ED Disposition     ED Disposition   Discharge    Condition   Stable    Date/Time   Tue Jul 5, 2022 11:58 PM    Comment   Chau Hudson discharge to home/self care                 Follow-up Information     Follow up With Specialties Details Why Contact Info Additional [address]  Obstetrics and Gynecology Go today for appointment [address] [phone] Rony Wilkinson Obstetrics & Gynecology CARLOS [place]a/Vasu Oliva Sanjeev [facility], [facility] [address]   [phone]    [facility] Emergency Medicine Go to  immediately for any new or worsening symptoms Willis Hagen  [address] Emergency Department, [address] [place] [address], [place], [place], [facility]          Discharge Medication List as of 7/6/2022 12:00 AM      CONTINUE these medications which have NOT CHANGED    Details   dicyclomine (BENTYL) 20 mg tablet Take 1 tablet (20 mg total) by mouth every 6 (six) hours as needed (abdominal cramping), Starting Wed 4/20/2022, Normal      ergocalciferol (VITAMIN D2) 50,000 units Starting Fri 6/24/2022, Historical Med      !! mesalamine (ASACOL) 800 MG EC tablet Take 800 mg by mouth in the morning and 800 mg at noon and 800 mg in the evening , Historical Med      !! mesalamine (LIALDA) 1 2 g EC tablet Take 2 tablets (2 4 g total) by mouth daily with breakfast, Starting Thu 5/12/2022, Normal      metroNIDAZOLE (METROGEL) 0 75 % gel Insert 1 applicator (5g) in the vagina once daily for 5 days, Normal      ondansetron (ZOFRAN-ODT) 4 mg disintegrating tablet Take 1 tablet (4 mg total) by mouth every 6 (six) hours as needed for nausea, Starting Fri 8/20/2021, Print       !! - Potential duplicate medications found  Please discuss with provider  No discharge procedures on file      PDMP Review     None          ED Provider  Electronically Signed by           Denis Calvillo DO  07/06/22 0893

## 2022-07-06 NOTE — ASSESSMENT & PLAN NOTE
Unable to palpate or see IUD strings  Patient has plans to make appointment for consultation with [facility] for further care

## 2022-07-08 LAB — BACTERIA UR CULT: ABNORMAL

## 2022-08-09 ENCOUNTER — TELEPHONE (OUTPATIENT)
Dept: OBGYN CLINIC | Facility: CLINIC | Age: 32
End: 2022-08-09

## 2022-08-09 DIAGNOSIS — Z3A.01 LESS THAN 8 WEEKS GESTATION OF PREGNANCY: Primary | ICD-10-CM

## 2022-08-09 NOTE — TELEPHONE ENCOUNTER
Please order HCG quant  We can also schedule and appointment for IUD insertion for her- this will depend on HCG dropping

## 2022-08-09 NOTE — TELEPHONE ENCOUNTER
Incoming call from pt-    She said she is very frustrated    Had IUD placed in May and ended up being pregnant though noted neg UPT and abstinent since LMP    She saw [facility] for OV and could not get to the strings  She went to [facility] on 7/12- had her termination and they removed the IUD  They no longer offer the mirena and that is what she wanted and they let her know this a day before her f/u appt  She does not want to waste time going there to ensure she is not pregnant and cannot get a new mirena there  She is concerned because he UPT are still +  Told her I will check with [facility] to see if ok to maybe order some serial  HCG's to confirm this and then maybe get her set up for a new Mirena

## 2022-08-10 ENCOUNTER — APPOINTMENT (OUTPATIENT)
Dept: LAB | Facility: CLINIC | Age: 32
End: 2022-08-10
Payer: COMMERCIAL

## 2022-08-10 DIAGNOSIS — Z3A.01 LESS THAN 8 WEEKS GESTATION OF PREGNANCY: ICD-10-CM

## 2022-08-10 LAB — B-HCG SERPL-ACNC: 45 MIU/ML

## 2022-08-10 PROCEDURE — 84702 CHORIONIC GONADOTROPIN TEST: CPT

## 2022-08-10 PROCEDURE — 36415 COLL VENOUS BLD VENIPUNCTURE: CPT

## 2022-08-11 ENCOUNTER — TELEPHONE (OUTPATIENT)
Dept: OBGYN CLINIC | Facility: CLINIC | Age: 32
End: 2022-08-11

## 2022-08-11 DIAGNOSIS — R10.2 PELVIC PAIN: Primary | ICD-10-CM

## 2022-08-11 NOTE — TELEPHONE ENCOUNTER
Pt called back today requesting a call back with her lab work results ASAP  Pt has another appointment and she would like to receive this phone call first       Please advise! Thanks!

## 2022-08-11 NOTE — TELEPHONE ENCOUNTER
I just listened to a dissertation from a very unhappy pt - re: mirena and negligence  She has had some bad experiences in past with Mirena and pregnancy  She wants an u/s before and after her Mirena insertion  She is demanding this  Pt has an appt with Monse on 8/24  I truly do not know what to do? ?  Thanks

## 2022-08-12 ENCOUNTER — TELEPHONE (OUTPATIENT)
Dept: OBGYN CLINIC | Facility: CLINIC | Age: 32
End: 2022-08-12

## 2022-08-12 NOTE — TELEPHONE ENCOUNTER
Called to discuss her concerns following her communication with reputation management yesterday  She feels at this time that her concerns were answered but she is upset about how handled  She is thankful for Dr Marbella Prabhakar placing order for an US to confirm products of conception are completely removed and she is no longer pregnant as her HCG levels is still elevated, as of 8/11/22 at 39  She states that in [place] with her 2 prior Mirena insertions they were inserted under US to confirm no pregnancy and also proper placement  She states that she requested this on 5/23 at insertion appt but was told that is not our practice  She feels as though her trust in our network is gone as this situation was very emotional for her and she is transferring care to [facility] open for discussion of changing her mind  Advised if she had other questions or concerns to please reach out

## 2022-08-12 NOTE — TELEPHONE ENCOUNTER
Luke can order the imaging for her if she would like  I"ll place the first order  This may delay her insertion due to appointment scheduling and finalization of results but we can do this  I placed the order for imaging

## 2022-08-14 ENCOUNTER — APPOINTMENT (EMERGENCY)
Dept: ULTRASOUND IMAGING | Facility: HOSPITAL | Age: 32
End: 2022-08-14
Payer: COMMERCIAL

## 2022-08-14 ENCOUNTER — HOSPITAL ENCOUNTER (EMERGENCY)
Facility: HOSPITAL | Age: 32
End: 2022-08-14
Attending: EMERGENCY MEDICINE | Admitting: EMERGENCY MEDICINE
Payer: COMMERCIAL

## 2022-08-14 ENCOUNTER — HOSPITAL ENCOUNTER (INPATIENT)
Facility: HOSPITAL | Age: 32
LOS: 3 days | Discharge: HOME | DRG: 770 | End: 2022-08-18
Attending: OBSTETRICS & GYNECOLOGY | Admitting: OBSTETRICS & GYNECOLOGY
Payer: COMMERCIAL

## 2022-08-14 VITALS
WEIGHT: 168 LBS | RESPIRATION RATE: 20 BRPM | BODY MASS INDEX: 25.54 KG/M2 | HEART RATE: 96 BPM | SYSTOLIC BLOOD PRESSURE: 157 MMHG | TEMPERATURE: 98 F | DIASTOLIC BLOOD PRESSURE: 70 MMHG | OXYGEN SATURATION: 98 %

## 2022-08-14 DIAGNOSIS — O03.4 RETAINED PRODUCTS OF CONCEPTION AFTER MISCARRIAGE: Primary | ICD-10-CM

## 2022-08-14 DIAGNOSIS — G89.18 POSTOPERATIVE PAIN: Primary | ICD-10-CM

## 2022-08-14 DIAGNOSIS — N93.9 ABNORMAL UTERINE BLEEDING (AUB): ICD-10-CM

## 2022-08-14 DIAGNOSIS — O26.30 PREGNANCY COMPLICATED BY INTRAUTERINE DEVICE (IUD): ICD-10-CM

## 2022-08-14 LAB
ALBUMIN SERPL BCP-MCNC: 3.9 G/DL (ref 3.5–5)
ALP SERPL-CCNC: 94 U/L (ref 46–116)
ALT SERPL W P-5'-P-CCNC: 16 U/L (ref 12–78)
ANION GAP SERPL CALCULATED.3IONS-SCNC: 7 MMOL/L (ref 4–13)
AST SERPL W P-5'-P-CCNC: 16 U/L (ref 5–45)
B-HCG SERPL-ACNC: 35 MIU/ML
BASOPHILS # BLD AUTO: 0.04 THOUSANDS/ΜL (ref 0–0.1)
BASOPHILS NFR BLD AUTO: 1 % (ref 0–1)
BILIRUB SERPL-MCNC: 0.29 MG/DL (ref 0.2–1)
BUN SERPL-MCNC: 8 MG/DL (ref 5–25)
CALCIUM SERPL-MCNC: 9.4 MG/DL (ref 8.3–10.1)
CHLORIDE SERPL-SCNC: 106 MMOL/L (ref 96–108)
CO2 SERPL-SCNC: 26 MMOL/L (ref 21–32)
CREAT SERPL-MCNC: 0.57 MG/DL (ref 0.6–1.3)
EOSINOPHIL # BLD AUTO: 0.32 THOUSAND/ΜL (ref 0–0.61)
EOSINOPHIL NFR BLD AUTO: 4 % (ref 0–6)
ERYTHROCYTE [DISTWIDTH] IN BLOOD BY AUTOMATED COUNT: 13.7 % (ref 11.6–15.1)
GFR SERPL CREATININE-BSD FRML MDRD: 121 ML/MIN/1.73SQ M
GLUCOSE SERPL-MCNC: 89 MG/DL (ref 65–140)
HCT VFR BLD AUTO: 40.2 % (ref 34.8–46.1)
HGB BLD-MCNC: 13.1 G/DL (ref 11.5–15.4)
IMM GRANULOCYTES # BLD AUTO: 0.01 THOUSAND/UL (ref 0–0.2)
IMM GRANULOCYTES NFR BLD AUTO: 0 % (ref 0–2)
INR PPP: 1.04 (ref 0.84–1.19)
LYMPHOCYTES # BLD AUTO: 1.67 THOUSANDS/ΜL (ref 0.6–4.47)
LYMPHOCYTES NFR BLD AUTO: 22 % (ref 14–44)
MCH RBC QN AUTO: 29.8 PG (ref 26.8–34.3)
MCHC RBC AUTO-ENTMCNC: 32.6 G/DL (ref 31.4–37.4)
MCV RBC AUTO: 91 FL (ref 82–98)
MONOCYTES # BLD AUTO: 0.69 THOUSAND/ΜL (ref 0.17–1.22)
MONOCYTES NFR BLD AUTO: 9 % (ref 4–12)
NEUTROPHILS # BLD AUTO: 4.77 THOUSANDS/ΜL (ref 1.85–7.62)
NEUTS SEG NFR BLD AUTO: 64 % (ref 43–75)
NRBC BLD AUTO-RTO: 0 /100 WBCS
PLATELET # BLD AUTO: 247 THOUSANDS/UL (ref 149–390)
PMV BLD AUTO: 10.5 FL (ref 8.9–12.7)
POTASSIUM SERPL-SCNC: 4.1 MMOL/L (ref 3.5–5.3)
PROT SERPL-MCNC: 7.6 G/DL (ref 6.4–8.4)
PROTHROMBIN TIME: 13.4 SECONDS (ref 11.6–14.5)
RBC # BLD AUTO: 4.4 MILLION/UL (ref 3.81–5.12)
SODIUM SERPL-SCNC: 139 MMOL/L (ref 135–147)
WBC # BLD AUTO: 7.5 THOUSAND/UL (ref 4.31–10.16)

## 2022-08-14 PROCEDURE — 85610 PROTHROMBIN TIME: CPT | Performed by: EMERGENCY MEDICINE

## 2022-08-14 PROCEDURE — 99285 EMERGENCY DEPT VISIT HI MDM: CPT

## 2022-08-14 PROCEDURE — 76856 US EXAM PELVIC COMPLETE: CPT

## 2022-08-14 PROCEDURE — 76830 TRANSVAGINAL US NON-OB: CPT

## 2022-08-14 PROCEDURE — 85025 COMPLETE CBC W/AUTO DIFF WBC: CPT | Performed by: EMERGENCY MEDICINE

## 2022-08-14 PROCEDURE — 80053 COMPREHEN METABOLIC PANEL: CPT | Performed by: EMERGENCY MEDICINE

## 2022-08-14 PROCEDURE — 84702 CHORIONIC GONADOTROPIN TEST: CPT | Performed by: EMERGENCY MEDICINE

## 2022-08-14 PROCEDURE — 99285 EMERGENCY DEPT VISIT HI MDM: CPT | Performed by: EMERGENCY MEDICINE

## 2022-08-14 PROCEDURE — 36415 COLL VENOUS BLD VENIPUNCTURE: CPT | Performed by: EMERGENCY MEDICINE

## 2022-08-14 RX ORDER — METOPROLOL SUCCINATE 25 MG/1
25 TABLET, EXTENDED RELEASE ORAL DAILY
COMMUNITY
Start: 2022-07-29

## 2022-08-14 RX ORDER — ONDANSETRON 2 MG/ML
4 INJECTION INTRAMUSCULAR; INTRAVENOUS EVERY 6 HOURS PRN
Status: DISCONTINUED | OUTPATIENT
Start: 2022-08-14 | End: 2022-08-18 | Stop reason: HOSPADM

## 2022-08-14 RX ORDER — ACETAMINOPHEN 325 MG/1
650 TABLET ORAL EVERY 6 HOURS PRN
Status: DISCONTINUED | OUTPATIENT
Start: 2022-08-14 | End: 2022-08-17

## 2022-08-14 RX ORDER — CELECOXIB 200 MG/1
CAPSULE ORAL
COMMUNITY
Start: 2022-08-04

## 2022-08-14 RX ORDER — KETOROLAC TROMETHAMINE 30 MG/ML
15 INJECTION, SOLUTION INTRAMUSCULAR; INTRAVENOUS EVERY 6 HOURS PRN
Status: DISPENSED | OUTPATIENT
Start: 2022-08-14 | End: 2022-08-15

## 2022-08-14 NOTE — LETTER
[address] [address]  Dept: [phone]    August 18, 2022     Patient: Carmelo [facility]   YOB: 1990   Date of Visit: 8/14/2022       To Whom it May Concern:    Carmelo [facility] is under my professional care  She was seen in the hospital from 8/14/2022   to 08/18/22  She may return to work on 8/25/22 without limitations  If you have any questions or concerns, please don't hesitate to call           Sincerely,          Abisai Culver MD

## 2022-08-14 NOTE — ED PROVIDER NOTES
History  Chief Complaint   Patient presents with    Vaginal Bleeding     Complex iud removal 1 month ago  Pain in left side of pelvis with vaginal bleeding today  27year old female patient comes to the ED with bright red vaginal bleeding  The patient had a terminated pregnancy recently due to pregnancy with IUD in place under the gestational sac  She has had no issues since the D/E was done and then today had high flow vaginal bleeding  She has no fever, no chills, no abdominal pain  She has no tenderness  She will be evaluated for possible retained products  History provided by:  Patient   used: No    Vaginal Bleeding  Quality:  Bright red  Severity:  Mild  Onset quality:  Gradual  Timing:  Constant  Progression:  Worsening  Chronicity:  New  Possible pregnancy: no    Relieved by:  Nothing  Worsened by:  Nothing  Ineffective treatments:  None tried  Associated symptoms: no dizziness    Risk factors: no gynecological surgery, no new sexual partner, no ovarian torsion and no STD        Prior to Admission Medications   Prescriptions Last Dose Informant Patient Reported? Taking?   celecoxib (CeleBREX) 200 mg capsule   Yes Yes   Sig: TAKE 1 CAPSULE BY MOUTH EVERY DAY AS DIRECTED FOR 30 DAYS   dicyclomine (BENTYL) 20 mg tablet   No No   Sig: Take 1 tablet (20 mg total) by mouth every 6 (six) hours as needed (abdominal cramping)   Patient not taking: Reported on 8/14/2022   ergocalciferol (VITAMIN D2) 50,000 units   Yes No   mesalamine (ASACOL) 800 MG EC tablet   Yes No   Sig: Take 800 mg by mouth in the morning and 800 mg at noon and 800 mg in the evening     Patient not taking: Reported on 8/14/2022   mesalamine (LIALDA) 1 2 g EC tablet   No No   Sig: Take 2 tablets (2 4 g total) by mouth daily with breakfast   Patient not taking: Reported on 8/14/2022   metoprolol succinate (TOPROL-XL) 25 mg 24 hr tablet   Yes No   Sig: Take 25 mg by mouth daily   metroNIDAZOLE (METROGEL) 0 75 % gel   No No   Sig: Insert 1 applicator (5g) in the vagina once daily for 5 days   ondansetron (ZOFRAN-ODT) 4 mg disintegrating tablet   No No   Sig: Take 1 tablet (4 mg total) by mouth every 6 (six) hours as needed for nausea      Facility-Administered Medications: None       Past Medical History:   Diagnosis Date    Hypertension     Hypothyroid     Ulcerative colitis ([address] )        Past Surgical History:   Procedure Laterality Date    APPENDECTOMY         History reviewed  No pertinent family history  I have reviewed and agree with the history as documented  E-Cigarette/Vaping    E-Cigarette Use Never User      E-Cigarette/Vaping Substances     Social History     Tobacco Use    Smoking status: Former Smoker    Smokeless tobacco: Never Used   Vaping Use    Vaping Use: Never used   Substance Use Topics    Alcohol use: Yes     Comment: rare    Drug use: Not Currently     Types: Marijuana       Review of Systems   Genitourinary: Positive for vaginal bleeding  Neurological: Negative for dizziness  All other systems reviewed and are negative  Physical Exam  Physical Exam  Vitals and nursing note reviewed  Constitutional:       Appearance: She is well-developed  HENT:      Head: Normocephalic and atraumatic  Right Ear: External ear normal       Left Ear: External ear normal    Eyes:      Conjunctiva/sclera: Conjunctivae normal    Neck:      Thyroid: No thyromegaly  Vascular: No JVD  Trachea: No tracheal deviation  Cardiovascular:      Rate and Rhythm: Normal rate  Pulmonary:      Effort: Pulmonary effort is normal       Breath sounds: Normal breath sounds  No stridor  Abdominal:      General: There is no distension  Palpations: Abdomen is soft  There is no mass  Tenderness: There is no abdominal tenderness  There is no guarding  Hernia: No hernia is present  Musculoskeletal:         General: No tenderness or deformity  Normal range of motion  Lymphadenopathy:      Cervical: No cervical adenopathy  Skin:     General: Skin is warm  Coloration: Skin is not pale  Findings: No erythema or rash  Neurological:      Mental Status: She is alert and oriented to person, place, and time     Psychiatric:         Behavior: Behavior normal          Vital Signs  ED Triage Vitals [08/14/22 1225]   Temperature Pulse Respirations Blood Pressure SpO2   98 °F (36 7 °C) 71 16 163/79 100 %      Temp Source Heart Rate Source Patient Position - Orthostatic VS BP Location FiO2 (%)   Oral Monitor Sitting Right arm --      Pain Score       7           Vitals:    08/14/22 1900 08/14/22 1930 08/14/22 2030 08/14/22 2100   BP: 145/77 153/73 149/72 157/70   Pulse: 74 73 82 96   Patient Position - Orthostatic VS: Lying Lying Lying Lying         Visual Acuity      ED Medications  Medications - No data to display    Diagnostic Studies  Results Reviewed     Procedure Component Value Units Date/Time    hCG, quantitative [[id number]]  (Abnormal) Collected: 08/14/22 1256    Lab Status: Final result Specimen: Blood from Arm, Left Updated: 08/14/22 1340     HCG, Quant 35 mIU/mL     Narrative:       Expected Ranges:     Approximate               Approximate HCG  Gestation age          Concentration ( mIU/mL)  _____________          ______________________   [place]                      HCG values  0 2-1                       5-50  1-2                           2-3                         100-5000  3-4                         500-50186  4-5                         1000-44546  5-6                         88002-137865  6-8                         48854-723979  8-12                        46921-701365      Comprehensive metabolic panel [[id number]]  (Abnormal) Collected: 08/14/22 1256    Lab Status: Final result Specimen: Blood from Arm, Left Updated: 08/14/22 1322     Sodium 139 mmol/L      Potassium 4 1 mmol/L      Chloride 106 mmol/L      CO2 26 mmol/L      ANION GAP 7 mmol/L BUN 8 mg/dL      Creatinine 0 57 mg/dL      Glucose 89 mg/dL      Calcium 9 4 mg/dL      AST 16 U/L      ALT 16 U/L      Alkaline Phosphatase 94 U/L      Total Protein 7 6 g/dL      Albumin 3 9 g/dL      Total Bilirubin 0 29 mg/dL      eGFR 121 ml/min/1 73sq m     Narrative:      National Kidney Disease Foundation guidelines for Chronic Kidney Disease (CKD):     Stage 1 with normal or high GFR (GFR > 90 mL/min/1 73 square meters)    Stage 2 Mild CKD (GFR = 60-89 mL/min/1 73 square meters)    Stage 3A Moderate CKD (GFR = 45-59 mL/min/1 73 square meters)    Stage 3B Moderate CKD (GFR = 30-44 mL/min/1 73 square meters)    Stage 4 Severe CKD (GFR = 15-29 mL/min/1 73 square meters)    Stage 5 End Stage CKD (GFR <15 mL/min/1 73 square meters)  Note: GFR calculation is accurate only with a steady state creatinine    Protime-INR [[id number]]  (Normal) Collected: 08/14/22 1256    Lab Status: Final result Specimen: Blood from Arm, Left Updated: 08/14/22 1317     Protime 13 4 seconds      INR 1 04    CBC and differential [[id number]] Collected: 08/14/22 1256    Lab Status: Final result Specimen: Blood from Arm, Left Updated: 08/14/22 1301     WBC 7 50 Thousand/uL      RBC 4 40 Million/uL      Hemoglobin 13 1 g/dL      Hematocrit 40 2 %      MCV 91 fL      MCH 29 8 pg      MCHC 32 6 g/dL      RDW 13 7 %      MPV 10 5 fL      Platelets 375 Thousands/uL      nRBC 0 /100 WBCs      Neutrophils Relative 64 %      Immat GRANS % 0 %      Lymphocytes Relative 22 %      Monocytes Relative 9 %      Eosinophils Relative 4 %      Basophils Relative 1 %      Neutrophils Absolute 4 77 Thousands/µL      Immature Grans Absolute 0 01 Thousand/uL      Lymphocytes Absolute 1 67 Thousands/µL      Monocytes Absolute 0 69 Thousand/µL      Eosinophils Absolute 0 32 Thousand/µL      Basophils Absolute 0 04 Thousands/µL                  US pelvis complete w transvaginal   Final Result by Leti Mirza MD (08/14 1704)       1    Tubular vascular structures deep to the endometrium in the posterior uterine body with significant vascular flow  Expanded endometrial canal with heterogeneous avascular contents  Findings would be most concerning for uterine avascular malformation    (AVM) post instrumentation with associated blood products in the endometrial canal  Alternatively, this could potentially represent avascular retained products of conception with myometrial vascular remodeling  2   Small echogenic foci with posterior shadowing in the anterior endometrial canal of uncertain etiology  These could represent calcifications or possibly tiny retained IUD fragments if there was difficult removal       The study was marked in EPIC for significant notification  Workstation performed: [id number]                    Procedures  Procedures         ED Course                               SBIRT 22yo+    Flowsheet Row Most Recent Value   SBIRT (23 yo +)    In order to provide better care to our patients, we are screening all of our patients for alcohol and drug use  Would it be okay to ask you these screening questions? Yes Filed at: 08/14/2022 1231   Initial Alcohol Screen: US AUDIT-C     1  How often do you have a drink containing alcohol? 1 Filed at: 08/14/2022 1231   2  How many drinks containing alcohol do you have on a typical day you are drinking? 0 Filed at: 08/14/2022 1231   3b  FEMALE Any Age, or MALE 65+: How often do you have 4 or more drinks on one occassion? 0 Filed at: 08/14/2022 1231   Audit-C Score 1 Filed at: 08/14/2022 1231   SUE: How many times in the past year have you    Used an illegal drug or used a prescription medication for non-medical reasons?  Never Filed at: 08/14/2022 1231                    MDM    Disposition  Final diagnoses:   Retained products of conception after miscarriage     Time reflects when diagnosis was documented in both MDM as applicable and the Disposition within this note     Time User Action Codes Description Comment    8/14/2022  5:54 PM Ji Bentley Add [O03 4] Retained products of conception after miscarriage       ED Disposition     ED Disposition   Transfer to Another Facility-In Network    Condition   --    Date/Time   Sun Aug 14, 2022  5:54 PM    Comment   Ai Thorne should be transferred out to [facility]             MD Documentation    Lakshmi Garner Most Recent Value   Patient Condition The patient has been stabilized such that within reasonable medical probability, no material deterioration of the patient condition or the condition of the unborn child([place]) is likely to result from the transfer   Provider Certification General risk, such as traffic hazards, adverse weather conditions, rough terrain or turbulence, possible failure of equipment (including vehicle or aircraft), or consequences of actions of persons outside the control of the transport personnel      RN Documentation    Flowsheet Row Most Recent Value   Bed Assignment [address] Given to MERVAT Beltran      Follow-up Information    None         Discharge Medication List as of 8/14/2022  9:34 PM      CONTINUE these medications which have NOT CHANGED    Details   celecoxib (CeleBREX) 200 mg capsule TAKE 1 CAPSULE BY MOUTH EVERY DAY AS DIRECTED FOR 30 DAYS, Historical Med      dicyclomine (BENTYL) 20 mg tablet Take 1 tablet (20 mg total) by mouth every 6 (six) hours as needed (abdominal cramping), Starting Wed 4/20/2022, Normal      ergocalciferol (VITAMIN D2) 50,000 units Starting Fri 6/24/2022, Historical Med      !! mesalamine (ASACOL) 800 MG EC tablet Take 800 mg by mouth in the morning and 800 mg at noon and 800 mg in the evening , Historical Med      !! mesalamine (LIALDA) 1 2 g EC tablet Take 2 tablets (2 4 g total) by mouth daily with breakfast, Starting Thu 5/12/2022, Normal      metoprolol succinate (TOPROL-XL) 25 mg 24 hr tablet Take 25 mg by mouth daily, Starting Fri 7/29/2022, Historical Med      metroNIDAZOLE (METROGEL) 0 75 % gel Insert 1 applicator (5g) in the vagina once daily for 5 days, Normal      ondansetron (ZOFRAN-ODT) 4 mg disintegrating tablet Take 1 tablet (4 mg total) by mouth every 6 (six) hours as needed for nausea, Starting Fri 8/20/2021, Print       !! - Potential duplicate medications found  Please discuss with provider  No discharge procedures on file      PDMP Review     None          ED Provider  Electronically Signed by           Meron Solo DO  08/15/22 2001

## 2022-08-14 NOTE — ED NOTES
P/U- [address] [facility]- [facility] - Accepting Dr Kerri Marie  [phone]     Paulino Medrano, RN  08/14/22 6741

## 2022-08-14 NOTE — EMTALA/ACUTE CARE TRANSFER
[address] [facility] [address]  [address] [place] [address]  Dept: [phone]      EMTALA TRANSFER CONSENT    NAME Baylee Talbert                                         1990                              MRN [id number]    I have been informed of my rights regarding examination, treatment, and transfer   by Dr Carmela Hemphill DO    Benefits:      Risks:        Consent for Transfer:  I acknowledge that my medical condition has been evaluated and explained to me by the emergency department physician or other qualified medical person and/or my attending physician, who has recommended that I be transferred to the service of    at    The above potential benefits of such transfer, the potential risks associated with such transfer, and the probable risks of not being transferred have been explained to me, and I fully understand them  The doctor has explained that, in my case, the benefits of transfer outweigh the risks  I agree to be transferred  I authorize the performance of emergency medical procedures and treatments upon me in both transit and upon arrival at the receiving facility  Additionally, I authorize the release of any and all medical records to the receiving facility and request they be transported with me, if possible  I understand that the safest mode of transportation during a medical emergency is an ambulance and that the Hospital advocates the use of this mode of transport  Risks of traveling to the receiving facility by car, including absence of medical control, life sustaining equipment, such as oxygen, and medical personnel has been explained to me and I fully understand them  (SYLVAIN CORRECT BOX BELOW)  [  ]  I consent to the stated transfer and to be transported by ambulance/helicopter  [  ]  I consent to the stated transfer, but refuse transportation by ambulance and accept full responsibility for my transportation by car    I understand the risks of non-ambulance transfers and I exonerate the Hospital and its staff from any deterioration in my condition that results from this refusal     X___________________________________________    DATE  22  TIME________  Signature of patient or legally responsible individual signing on patient behalf           RELATIONSHIP TO PATIENT_________________________          Provider Certification    NAME Liss Redman                                         1990                              MRN [id number]    A medical screening exam was performed on the above named patient  Based on the examination:    Condition Necessitating Transfer The encounter diagnosis was Retained products of conception after miscarriage  Patient Condition: The patient has been stabilized such that within reasonable medical probability, no material deterioration of the patient condition or the condition of the unborn child([place]) is likely to result from the transfer    Reason for Transfer:      Transfer Requirements: Facility     · Space available and qualified personnel available for treatment as acknowledged by    · Agreed to accept transfer and to provide appropriate medical treatment as acknowledged by          · Appropriate medical records of the examination and treatment of the patient are provided at the time of transfer   [address] [facility], [address] _______  · Transfer will be performed by qualified personnel from    and appropriate transfer equipment as required, including the use of necessary and appropriate life support measures      Provider Certification: I have examined the patient and explained the following risks and benefits of being transferred/refusing transfer to the patient/family:  General risk, such as traffic hazards, adverse weather conditions, rough terrain or turbulence, possible failure of equipment (including vehicle or aircraft), or consequences of actions of persons outside the control of the transport personnel      Based on these reasonable risks and benefits to the patient and/or the unborn child([place]), and based upon the information available at the time of the patients examination, I certify that the medical benefits reasonably to be expected from the provision of appropriate medical treatments at another medical facility outweigh the increasing risks, if any, to the individuals medical condition, and in the case of labor to the unborn child, from effecting the transfer      X____________________________________________ DATE 08/14/22        TIME_______      ORIGINAL - SEND TO MEDICAL RECORDS   COPY - SEND WITH PATIENT DURING TRANSFER

## 2022-08-15 ENCOUNTER — APPOINTMENT (OUTPATIENT)
Dept: CT IMAGING | Facility: HOSPITAL | Age: 32
DRG: 770 | End: 2022-08-15
Payer: COMMERCIAL

## 2022-08-15 LAB
ABO GROUP BLD: NORMAL
ABO GROUP BLD: NORMAL
B-HCG SERPL-ACNC: 46 MIU/ML (ref 0–11.6)
BLD GP AB SCN SERPL QL: NEGATIVE
ERYTHROCYTE [DISTWIDTH] IN BLOOD BY AUTOMATED COUNT: 13.9 % (ref 11.6–15.1)
HCT VFR BLD AUTO: 37.7 % (ref 34.8–46.1)
HGB BLD-MCNC: 12.5 G/DL (ref 11.5–15.4)
MCH RBC QN AUTO: 30.2 PG (ref 26.8–34.3)
MCHC RBC AUTO-ENTMCNC: 33.2 G/DL (ref 31.4–37.4)
MCV RBC AUTO: 91 FL (ref 82–98)
PLATELET # BLD AUTO: 222 THOUSANDS/UL (ref 149–390)
PMV BLD AUTO: 10.4 FL (ref 8.9–12.7)
RBC # BLD AUTO: 4.14 MILLION/UL (ref 3.81–5.12)
RH BLD: POSITIVE
RH BLD: POSITIVE
SPECIMEN EXPIRATION DATE: NORMAL
WBC # BLD AUTO: 6.51 THOUSAND/UL (ref 4.31–10.16)

## 2022-08-15 PROCEDURE — 86900 BLOOD TYPING SEROLOGIC ABO: CPT | Performed by: OBSTETRICS & GYNECOLOGY

## 2022-08-15 PROCEDURE — 86901 BLOOD TYPING SEROLOGIC RH(D): CPT | Performed by: OBSTETRICS & GYNECOLOGY

## 2022-08-15 PROCEDURE — 74174 CTA ABD&PLVS W/CONTRAST: CPT

## 2022-08-15 PROCEDURE — G1004 CDSM NDSC: HCPCS

## 2022-08-15 PROCEDURE — NC001 PR NO CHARGE: Performed by: OBSTETRICS & GYNECOLOGY

## 2022-08-15 PROCEDURE — 86850 RBC ANTIBODY SCREEN: CPT | Performed by: OBSTETRICS & GYNECOLOGY

## 2022-08-15 PROCEDURE — 84702 CHORIONIC GONADOTROPIN TEST: CPT | Performed by: OBSTETRICS & GYNECOLOGY

## 2022-08-15 PROCEDURE — 85027 COMPLETE CBC AUTOMATED: CPT | Performed by: OBSTETRICS & GYNECOLOGY

## 2022-08-15 RX ORDER — METOPROLOL SUCCINATE 25 MG/1
25 TABLET, EXTENDED RELEASE ORAL DAILY
Status: DISCONTINUED | OUTPATIENT
Start: 2022-08-15 | End: 2022-08-18 | Stop reason: HOSPADM

## 2022-08-15 RX ORDER — SODIUM CHLORIDE, SODIUM LACTATE, POTASSIUM CHLORIDE, CALCIUM CHLORIDE 600; 310; 30; 20 MG/100ML; MG/100ML; MG/100ML; MG/100ML
125 INJECTION, SOLUTION INTRAVENOUS CONTINUOUS
Status: DISCONTINUED | OUTPATIENT
Start: 2022-08-15 | End: 2022-08-16

## 2022-08-15 RX ADMIN — KETOROLAC TROMETHAMINE 15 MG: 30 INJECTION, SOLUTION INTRAMUSCULAR at 11:40

## 2022-08-15 RX ADMIN — SODIUM CHLORIDE, POTASSIUM CHLORIDE, SODIUM LACTATE AND CALCIUM CHLORIDE 125 ML/HR: 600; 310; 30; 20 INJECTION, SOLUTION INTRAVENOUS at 04:05

## 2022-08-15 RX ADMIN — KETOROLAC TROMETHAMINE 15 MG: 30 INJECTION, SOLUTION INTRAMUSCULAR at 00:42

## 2022-08-15 RX ADMIN — SODIUM CHLORIDE, POTASSIUM CHLORIDE, SODIUM LACTATE AND CALCIUM CHLORIDE 125 ML/HR: 600; 310; 30; 20 INJECTION, SOLUTION INTRAVENOUS at 21:45

## 2022-08-15 RX ADMIN — METOPROLOL SUCCINATE 25 MG: 25 TABLET, EXTENDED RELEASE ORAL at 10:01

## 2022-08-15 RX ADMIN — KETOROLAC TROMETHAMINE 15 MG: 30 INJECTION, SOLUTION INTRAMUSCULAR at 21:54

## 2022-08-15 RX ADMIN — IOHEXOL 70 ML: 350 INJECTION, SOLUTION INTRAVENOUS at 15:42

## 2022-08-15 NOTE — PROGRESS NOTES
Patient was reevaluated  Repeat hemoglobin this morning was stable  Her bleeding is minimal to none at this time  She does have some cramping that improves with Toradol  She remains NPO  Case was discussed with Radiologists  Pelvic ultrasound images were reviewed  They agree with proceeding with CTA of the pelvis to help better characterize the intrauterine findings  Although MRA would be best for characterizing an AVM itself, CTA will also help with characterizing the intrauterine findings  If she is stable after imaging is completed, will allow her to eat  Continue IV fluid maintenance until then  Discussed with attending on call Dr Wagner Rucker MD  PGY-III, OB/GYN  8/15/2022, 1:14 PM

## 2022-08-15 NOTE — PLAN OF CARE
Problem: PAIN - ADULT  Goal: Verbalizes/displays adequate comfort level or baseline comfort level  Description: Interventions:  - Encourage patient to monitor pain and request assistance  - Assess pain using appropriate pain scale  - Administer analgesics based on type and severity of pain and evaluate response  - Implement non-pharmacological measures as appropriate and evaluate response  - Consider cultural and social influences on pain and pain management  - Notify physician/advanced practitioner if interventions unsuccessful or patient reports new pain  Outcome: Progressing     Problem: INFECTION - ADULT  Goal: Absence or prevention of progression during hospitalization  Description: INTERVENTIONS:  - Assess and monitor for signs and symptoms of infection  - Monitor lab/diagnostic results  - Monitor all insertion sites, i e  indwelling lines, tubes, and drains  - Monitor endotracheal if appropriate and nasal secretions for changes in amount and color  - [place] appropriate cooling/warming therapies per order  - Administer medications as ordered  - Instruct and encourage patient and family to use good hand hygiene technique  - Identify and instruct in appropriate isolation precautions for identified infection/condition  Outcome: Progressing     Problem: DISCHARGE PLANNING  Goal: Discharge to home or other facility with appropriate resources  Description: INTERVENTIONS:  - Identify barriers to discharge w/patient and caregiver  - Arrange for needed discharge resources and transportation as appropriate  - Identify discharge learning needs (meds, wound care, etc )  - Arrange for interpretive services to assist at discharge as needed  - Refer to Case Management Department for coordinating discharge planning if the patient needs post-hospital services based on physician/advanced practitioner order or complex needs related to functional status, cognitive ability, or social support system  Outcome: Progressing Problem: Knowledge Deficit  Goal: Patient/family/caregiver demonstrates understanding of disease process, treatment plan, medications, and discharge instructions  Description: Complete learning assessment and assess knowledge base    Interventions:  - Provide teaching at level of understanding  - Provide teaching via preferred learning methods  Outcome: Progressing
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
## 2022-08-15 NOTE — ASSESSMENT & PLAN NOTE
- s/p hysteroscopy, D&C, Mirena IUD removal which confirmed retained products of conception  - Discussed operative findings  - Pain well managed with motrin  - Mood: stable  - Discussed future fertility goals this morning  - Dispo: home today
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
## 2022-08-15 NOTE — PLAN OF CARE
Problem: PAIN - ADULT  Goal: Verbalizes/displays adequate comfort level or baseline comfort level  Description: Interventions:  - Encourage patient to monitor pain and request assistance  - Assess pain using appropriate pain scale  - Administer analgesics based on type and severity of pain and evaluate response  - Implement non-pharmacological measures as appropriate and evaluate response  - Consider cultural and social influences on pain and pain management  - Notify physician/advanced practitioner if interventions unsuccessful or patient reports new pain  Outcome: Progressing     Problem: INFECTION - ADULT  Goal: Absence or prevention of progression during hospitalization  Description: INTERVENTIONS:  - Assess and monitor for signs and symptoms of infection  - Monitor lab/diagnostic results  - Monitor all insertion sites, i e  indwelling lines, tubes, and drains  - Monitor endotracheal if appropriate and nasal secretions for changes in amount and color  - [place] appropriate cooling/warming therapies per order  - Administer medications as ordered  - Instruct and encourage patient and family to use good hand hygiene technique  - Identify and instruct in appropriate isolation precautions for identified infection/condition  Outcome: Progressing     Problem: DISCHARGE PLANNING  Goal: Discharge to home or other facility with appropriate resources  Description: INTERVENTIONS:  - Identify barriers to discharge w/patient and caregiver  - Arrange for needed discharge resources and transportation as appropriate  - Identify discharge learning needs (meds, wound care, etc )  - Arrange for interpretive services to assist at discharge as needed  - Refer to Case Management Department for coordinating discharge planning if the patient needs post-hospital services based on physician/advanced practitioner order or complex needs related to functional status, cognitive ability, or social support system  Outcome: Progressing Problem: Knowledge Deficit  Goal: Patient/family/caregiver demonstrates understanding of disease process, treatment plan, medications, and discharge instructions  Description: Complete learning assessment and assess knowledge base    Interventions:  - Provide teaching at level of understanding  - Provide teaching via preferred learning methods  Outcome: Progressing

## 2022-08-16 LAB
ERYTHROCYTE [DISTWIDTH] IN BLOOD BY AUTOMATED COUNT: 13.6 % (ref 11.6–15.1)
HCT VFR BLD AUTO: 36.9 % (ref 34.8–46.1)
HGB BLD-MCNC: 12.2 G/DL (ref 11.5–15.4)
MCH RBC QN AUTO: 30.3 PG (ref 26.8–34.3)
MCHC RBC AUTO-ENTMCNC: 33.1 G/DL (ref 31.4–37.4)
MCV RBC AUTO: 92 FL (ref 82–98)
PLATELET # BLD AUTO: 211 THOUSANDS/UL (ref 149–390)
PMV BLD AUTO: 10.9 FL (ref 8.9–12.7)
RBC # BLD AUTO: 4.03 MILLION/UL (ref 3.81–5.12)
WBC # BLD AUTO: 6.06 THOUSAND/UL (ref 4.31–10.16)

## 2022-08-16 PROCEDURE — 99232 SBSQ HOSP IP/OBS MODERATE 35: CPT | Performed by: OBSTETRICS & GYNECOLOGY

## 2022-08-16 PROCEDURE — 85027 COMPLETE CBC AUTOMATED: CPT | Performed by: OBSTETRICS & GYNECOLOGY

## 2022-08-16 RX ORDER — ACETAMINOPHEN 325 MG/1
975 TABLET ORAL ONCE
Status: COMPLETED | OUTPATIENT
Start: 2022-08-17 | End: 2022-08-17

## 2022-08-16 RX ORDER — SODIUM CHLORIDE, SODIUM LACTATE, POTASSIUM CHLORIDE, CALCIUM CHLORIDE 600; 310; 30; 20 MG/100ML; MG/100ML; MG/100ML; MG/100ML
125 INJECTION, SOLUTION INTRAVENOUS CONTINUOUS
Status: DISCONTINUED | OUTPATIENT
Start: 2022-08-17 | End: 2022-08-17

## 2022-08-16 RX ORDER — CELECOXIB 100 MG/1
200 CAPSULE ORAL ONCE
Status: COMPLETED | OUTPATIENT
Start: 2022-08-17 | End: 2022-08-17

## 2022-08-16 RX ORDER — GABAPENTIN 100 MG/1
100 CAPSULE ORAL ONCE
Status: COMPLETED | OUTPATIENT
Start: 2022-08-17 | End: 2022-08-17

## 2022-08-16 RX ADMIN — SODIUM CHLORIDE, POTASSIUM CHLORIDE, SODIUM LACTATE AND CALCIUM CHLORIDE 125 ML/HR: 600; 310; 30; 20 INJECTION, SOLUTION INTRAVENOUS at 06:47

## 2022-08-16 RX ADMIN — ACETAMINOPHEN 650 MG: 325 TABLET ORAL at 21:39

## 2022-08-16 RX ADMIN — METOPROLOL SUCCINATE 25 MG: 25 TABLET, EXTENDED RELEASE ORAL at 08:43

## 2022-08-16 RX ADMIN — ACETAMINOPHEN 650 MG: 325 TABLET ORAL at 08:50

## 2022-08-16 NOTE — PLAN OF CARE
Problem: PAIN - ADULT  Goal: Verbalizes/displays adequate comfort level or baseline comfort level  Description: Interventions:  - Encourage patient to monitor pain and request assistance  - Assess pain using appropriate pain scale  - Administer analgesics based on type and severity of pain and evaluate response  - Implement non-pharmacological measures as appropriate and evaluate response  - Consider cultural and social influences on pain and pain management  - Notify physician/advanced practitioner if interventions unsuccessful or patient reports new pain  Outcome: Progressing     Problem: INFECTION - ADULT  Goal: Absence or prevention of progression during hospitalization  Description: INTERVENTIONS:  - Assess and monitor for signs and symptoms of infection  - Monitor lab/diagnostic results  - Monitor all insertion sites, i e  indwelling lines, tubes, and drains  - Monitor endotracheal if appropriate and nasal secretions for changes in amount and color  - [place] appropriate cooling/warming therapies per order  - Administer medications as ordered  - Instruct and encourage patient and family to use good hand hygiene technique  - Identify and instruct in appropriate isolation precautions for identified infection/condition  Outcome: Progressing     Problem: DISCHARGE PLANNING  Goal: Discharge to home or other facility with appropriate resources  Description: INTERVENTIONS:  - Identify barriers to discharge w/patient and caregiver  - Arrange for needed discharge resources and transportation as appropriate  - Identify discharge learning needs (meds, wound care, etc )  - Arrange for interpretive services to assist at discharge as needed  - Refer to Case Management Department for coordinating discharge planning if the patient needs post-hospital services based on physician/advanced practitioner order or complex needs related to functional status, cognitive ability, or social support system  Outcome: Progressing Problem: Knowledge Deficit  Goal: Patient/family/caregiver demonstrates understanding of disease process, treatment plan, medications, and discharge instructions  Description: Complete learning assessment and assess knowledge base    Interventions:  - Provide teaching at level of understanding  - Provide teaching via preferred learning methods  Outcome: Progressing     Problem: Potential for Falls  Goal: Patient will remain free of falls  Description: INTERVENTIONS:  - Educate patient/family on patient safety including physical limitations  - Instruct patient to call for assistance with activity   - Consult OT/PT to assist with strengthening/mobility   - Keep Call bell within reach  - Keep bed low and locked with side rails adjusted as appropriate  - Keep care items and personal belongings within reach  - Initiate and maintain comfort rounds  - Make Fall Risk Sign visible to staff  - Apply yellow socks and bracelet for high fall risk patients  - Consider moving patient to room near nurses station  Outcome: Progressing

## 2022-08-16 NOTE — DISCHARGE SUMMARY
Gynecology Discharge Summary   Nelda Haynes MRN: [id number]  Unit/Bed#: W -01 Encounter: [id number]      Admission Date: 8/14/2022     Discharge Date: 8/18/2022    Admitting Attending: Ivett Osborn MD  Discharge Attending: Taylor Jordan MD    Diagnoses:  Abnormal uterine bleeding  Retained products of conception after termination of pregnancy  Hypertension    Procedures:   Hysteroscopy, dilation and curettage, removal of IUD    Hospital course: Patient presented on 08/14/2022 with acute onset heavy vaginal bleeding  She was status post D&E for termination of pregnancy on 07/12/2022 after conceiving with a Mirena IUD in place  Pelvic ultrasound was concerning for uterine AVM versus retained products of conception and fragments of IUD  Follow up CTA of the abdomen and pelvis confirmed presence of an intact IUD in the endometrium and vascularity of the posterior uterine wall also suspicious for retained products of conception vs uterine AVM  Her bleeding remained stable while inpatient and was never as heavy as the episode that prompted her initial evalaution  On 8/17/2022 she had an uncomplicated dilation and curettage with hysteroscopy for removal of products of conception  The Mirena IUD was also removed  She was discharged home in stable condition on POD1        Lab Results:   Lab Results   Component Value Date    WBC 6 06 08/16/2022    HGB 12 2 08/16/2022    HCT 36 9 08/16/2022    MCV 92 08/16/2022     08/16/2022     Lab Results   Component Value Date    CALCIUM 9 4 08/14/2022    K 4 1 08/14/2022    CO2 26 08/14/2022     08/14/2022    BUN 8 08/14/2022    CREATININE 0 57 (L) 08/14/2022     No results found for: POCGLU  No results found for: PTT  Lab Results   Component Value Date    INR 1 04 08/14/2022    PROTIME 13 4 78/06/5323       Complications: none apparent    Condition at discharge: stable     Discharge instructions/Information to patient and family:   See After Visit Summary for information provided to patient and family  Provisions for Follow-Up Care:  See After Visit Summary for information related to follow-up care and any pertinent home health orders  Disposition: See After Visit Summary for discharge disposition information  Planned Readmission: Yes, pending clinical status    Discharge Medications: For a complete list of the patient's medications, please refer to her med rec  Magabby Bang [facility] Gynecology PGY-3  8/18/2022  8:41 AM

## 2022-08-16 NOTE — PLAN OF CARE
Problem: PAIN - ADULT  Goal: Verbalizes/displays adequate comfort level or baseline comfort level  Description: Interventions:  - Encourage patient to monitor pain and request assistance  - Assess pain using appropriate pain scale  - Administer analgesics based on type and severity of pain and evaluate response  - Implement non-pharmacological measures as appropriate and evaluate response  - Consider cultural and social influences on pain and pain management  - Notify physician/advanced practitioner if interventions unsuccessful or patient reports new pain  Outcome: Progressing     Problem: INFECTION - ADULT  Goal: Absence or prevention of progression during hospitalization  Description: INTERVENTIONS:  - Assess and monitor for signs and symptoms of infection  - Monitor lab/diagnostic results  - Monitor all insertion sites, i e  indwelling lines, tubes, and drains  - Monitor endotracheal if appropriate and nasal secretions for changes in amount and color  - [place] appropriate cooling/warming therapies per order  - Administer medications as ordered  - Instruct and encourage patient and family to use good hand hygiene technique  - Identify and instruct in appropriate isolation precautions for identified infection/condition  Outcome: Progressing     Problem: DISCHARGE PLANNING  Goal: Discharge to home or other facility with appropriate resources  Description: INTERVENTIONS:  - Identify barriers to discharge w/patient and caregiver  - Arrange for needed discharge resources and transportation as appropriate  - Identify discharge learning needs (meds, wound care, etc )  - Arrange for interpretive services to assist at discharge as needed  - Refer to Case Management Department for coordinating discharge planning if the patient needs post-hospital services based on physician/advanced practitioner order or complex needs related to functional status, cognitive ability, or social support system  Outcome: Progressing Problem: Knowledge Deficit  Goal: Patient/family/caregiver demonstrates understanding of disease process, treatment plan, medications, and discharge instructions  Description: Complete learning assessment and assess knowledge base    Interventions:  - Provide teaching at level of understanding  - Provide teaching via preferred learning methods  Outcome: Progressing     Problem: Potential for Falls  Goal: Patient will remain free of falls  Description: INTERVENTIONS:  - Educate patient/family on patient safety including physical limitations  - Instruct patient to call for assistance with activity   - Consult OT/PT to assist with strengthening/mobility   - Keep Call bell within reach  - Keep bed low and locked with side rails adjusted as appropriate  - Keep care items and personal belongings within reach  - Initiate and maintain comfort rounds  - Make Fall Risk Sign visible to staff  - Offer Toileting every  Hours, in advance of need  - Initiate/Maintain alarm  - Obtain necessary fall risk management equipment:   - Apply yellow socks and bracelet for high fall risk patients  - Consider moving patient to room near nurses station  Outcome: Progressing

## 2022-08-16 NOTE — PROGRESS NOTES
Gynecology Progress Note  Papo Vera 27 y o  female MRN: [id number]  Unit/Bed#: W -01 Encounter: [id number]    Assessment/Plan:  40-year-old  012 status post surgical termination of pregnancy on 2022 after conception with Mirena IUD in place  Presented with single episode of heavy vaginal bleeding on 2022  By issue:    Ulcerative colitis [facility])  Assessment & Plan  Patient reported that she is not currently taking her medications  Will order if requested     Hypertension  Assessment & Plan  Continue home Metoprolol 25 mg daily    * Abnormal uterine bleeding (AUB)  Assessment & Plan  -Pelvic ultrasound and CTA abdomen/pelvis with concerns for AVM versus retained products of conception  Intact IUD confirmed on CTA  Last beta hCG 46 on 8/15  -Bleeding has remained minimal   Hemoglobin and vital signs are stable  -Will discuss with Radiology today possibility for pelvic MRA for better characterization of the vascular region in the uterus concerning for AVM versus retained products of conception to aid in preoperative planning  If indeed AVM, will discuss possibility of embolization with Interventional Radiology  -Add on case pending for hysteroscopy, D&C, removal of pathology and IUD  -Continue to monitor vaginal bleeding        Subjective:   Alba is doing well this morning she changed 2 pads overnight but they were not fully saturated  She has no chest pain, shortness of breath, fevers, chills, leg or calf pain  Objective:   Vitals: Blood pressure 154/88, pulse 71, temperature 98 2 °F (36 8 °C), resp  rate 17, SpO2 98 %, not currently breastfeeding  Physical Exam:   Physical Exam  Vitals reviewed  Constitutional:       Appearance: Normal appearance  HENT:      Head: Normocephalic and atraumatic  Cardiovascular:      Rate and Rhythm: Normal rate  Heart sounds: Normal heart sounds  Pulmonary:      Effort: Pulmonary effort is normal  No respiratory distress        Breath sounds: No wheezing or rales  Abdominal:      General: There is no distension  Palpations: Abdomen is soft  Tenderness: There is no abdominal tenderness  There is no guarding or rebound  Musculoskeletal:         General: No tenderness  Normal range of motion  Skin:     General: Skin is warm and dry  Neurological:      Mental Status: She is alert and oriented to person, place, and time  Psychiatric:         Mood and Affect: Mood normal          Behavior: Behavior normal          Lab, Imaging and other studies: I have personally reviewed pertinent reports  Lab Results   Component Value Date    WBC 6 06 08/16/2022    HGB 12 2 08/16/2022    HCT 36 9 08/16/2022    MCV 92 08/16/2022     08/16/2022         Jacquelyn Stroud MD  PGY II, OB/GYN  8/16/2022, 7:49 AM

## 2022-08-17 ENCOUNTER — ANESTHESIA (INPATIENT)
Dept: PERIOP | Facility: HOSPITAL | Age: 32
DRG: 770 | End: 2022-08-17
Payer: COMMERCIAL

## 2022-08-17 ENCOUNTER — ANESTHESIA EVENT (INPATIENT)
Dept: PERIOP | Facility: HOSPITAL | Age: 32
DRG: 770 | End: 2022-08-17
Payer: COMMERCIAL

## 2022-08-17 PROCEDURE — 88305 TISSUE EXAM BY PATHOLOGIST: CPT | Performed by: PATHOLOGY

## 2022-08-17 PROCEDURE — 88342 IMHCHEM/IMCYTCHM 1ST ANTB: CPT | Performed by: PATHOLOGY

## 2022-08-17 PROCEDURE — 59812 TREATMENT OF MISCARRIAGE: CPT | Performed by: OBSTETRICS & GYNECOLOGY

## 2022-08-17 PROCEDURE — 58555 HYSTEROSCOPY DX SEP PROC: CPT | Performed by: OBSTETRICS & GYNECOLOGY

## 2022-08-17 PROCEDURE — 99024 POSTOP FOLLOW-UP VISIT: CPT | Performed by: STUDENT IN AN ORGANIZED HEALTH CARE EDUCATION/TRAINING PROGRAM

## 2022-08-17 PROCEDURE — NC001 PR NO CHARGE: Performed by: OBSTETRICS & GYNECOLOGY

## 2022-08-17 PROCEDURE — 10D17ZZ EXTRACTION OF PRODUCTS OF CONCEPTION, RETAINED, VIA NATURAL OR ARTIFICIAL OPENING: ICD-10-PCS | Performed by: OBSTETRICS & GYNECOLOGY

## 2022-08-17 PROCEDURE — 10D18ZZ EXTRACTION OF PRODUCTS OF CONCEPTION, RETAINED, VIA NATURAL OR ARTIFICIAL OPENING ENDOSCOPIC: ICD-10-PCS | Performed by: OBSTETRICS & GYNECOLOGY

## 2022-08-17 PROCEDURE — 0UPD8HZ REMOVAL OF CONTRACEPTIVE DEVICE FROM UTERUS AND CERVIX, VIA NATURAL OR ARTIFICIAL OPENING ENDOSCOPIC: ICD-10-PCS | Performed by: OBSTETRICS & GYNECOLOGY

## 2022-08-17 PROCEDURE — 58301 REMOVE INTRAUTERINE DEVICE: CPT | Performed by: OBSTETRICS & GYNECOLOGY

## 2022-08-17 RX ORDER — LIDOCAINE HYDROCHLORIDE 10 MG/ML
INJECTION, SOLUTION EPIDURAL; INFILTRATION; INTRACAUDAL; PERINEURAL AS NEEDED
Status: DISCONTINUED | OUTPATIENT
Start: 2022-08-17 | End: 2022-08-17

## 2022-08-17 RX ORDER — KETOROLAC TROMETHAMINE 30 MG/ML
15 INJECTION, SOLUTION INTRAMUSCULAR; INTRAVENOUS ONCE
Status: COMPLETED | OUTPATIENT
Start: 2022-08-17 | End: 2022-08-17

## 2022-08-17 RX ORDER — FENTANYL CITRATE 50 UG/ML
INJECTION, SOLUTION INTRAMUSCULAR; INTRAVENOUS AS NEEDED
Status: DISCONTINUED | OUTPATIENT
Start: 2022-08-17 | End: 2022-08-17

## 2022-08-17 RX ORDER — MIDAZOLAM HYDROCHLORIDE 2 MG/2ML
INJECTION, SOLUTION INTRAMUSCULAR; INTRAVENOUS AS NEEDED
Status: DISCONTINUED | OUTPATIENT
Start: 2022-08-17 | End: 2022-08-17

## 2022-08-17 RX ORDER — METOCLOPRAMIDE HYDROCHLORIDE 5 MG/ML
10 INJECTION INTRAMUSCULAR; INTRAVENOUS ONCE AS NEEDED
Status: DISCONTINUED | OUTPATIENT
Start: 2022-08-17 | End: 2022-08-17 | Stop reason: HOSPADM

## 2022-08-17 RX ORDER — ONDANSETRON 2 MG/ML
4 INJECTION INTRAMUSCULAR; INTRAVENOUS ONCE AS NEEDED
Status: DISCONTINUED | OUTPATIENT
Start: 2022-08-17 | End: 2022-08-17 | Stop reason: HOSPADM

## 2022-08-17 RX ORDER — FENTANYL CITRATE/PF 50 MCG/ML
25 SYRINGE (ML) INJECTION
Status: DISCONTINUED | OUTPATIENT
Start: 2022-08-17 | End: 2022-08-17 | Stop reason: HOSPADM

## 2022-08-17 RX ORDER — DEXAMETHASONE SODIUM PHOSPHATE 10 MG/ML
INJECTION, SOLUTION INTRAMUSCULAR; INTRAVENOUS AS NEEDED
Status: DISCONTINUED | OUTPATIENT
Start: 2022-08-17 | End: 2022-08-17

## 2022-08-17 RX ORDER — OXYCODONE HYDROCHLORIDE 5 MG/1
5 TABLET ORAL EVERY 4 HOURS PRN
Status: DISCONTINUED | OUTPATIENT
Start: 2022-08-17 | End: 2022-08-18 | Stop reason: HOSPADM

## 2022-08-17 RX ORDER — IBUPROFEN 600 MG/1
600 TABLET ORAL EVERY 6 HOURS PRN
Status: DISCONTINUED | OUTPATIENT
Start: 2022-08-17 | End: 2022-08-18 | Stop reason: HOSPADM

## 2022-08-17 RX ORDER — ONDANSETRON 2 MG/ML
INJECTION INTRAMUSCULAR; INTRAVENOUS AS NEEDED
Status: DISCONTINUED | OUTPATIENT
Start: 2022-08-17 | End: 2022-08-17

## 2022-08-17 RX ORDER — PROPOFOL 10 MG/ML
INJECTION, EMULSION INTRAVENOUS CONTINUOUS PRN
Status: DISCONTINUED | OUTPATIENT
Start: 2022-08-17 | End: 2022-08-17

## 2022-08-17 RX ORDER — ACETAMINOPHEN 325 MG/1
650 TABLET ORAL EVERY 6 HOURS SCHEDULED
Qty: 30 TABLET | Refills: 0 | Status: CANCELLED | OUTPATIENT
Start: 2022-08-17

## 2022-08-17 RX ORDER — HYDROMORPHONE HCL/PF 1 MG/ML
0.5 SYRINGE (ML) INJECTION
Status: DISCONTINUED | OUTPATIENT
Start: 2022-08-17 | End: 2022-08-17 | Stop reason: HOSPADM

## 2022-08-17 RX ORDER — ACETAMINOPHEN 325 MG/1
650 TABLET ORAL EVERY 6 HOURS SCHEDULED
Status: DISCONTINUED | OUTPATIENT
Start: 2022-08-17 | End: 2022-08-18 | Stop reason: HOSPADM

## 2022-08-17 RX ORDER — PROPOFOL 10 MG/ML
INJECTION, EMULSION INTRAVENOUS AS NEEDED
Status: DISCONTINUED | OUTPATIENT
Start: 2022-08-17 | End: 2022-08-17

## 2022-08-17 RX ORDER — SODIUM CHLORIDE 9 MG/ML
INJECTION, SOLUTION INTRAVENOUS AS NEEDED
Status: DISCONTINUED | OUTPATIENT
Start: 2022-08-17 | End: 2022-08-17 | Stop reason: HOSPADM

## 2022-08-17 RX ADMIN — MIDAZOLAM HYDROCHLORIDE 2 MG: 1 INJECTION, SOLUTION INTRAMUSCULAR; INTRAVENOUS at 12:33

## 2022-08-17 RX ADMIN — KETOROLAC TROMETHAMINE 15 MG: 30 INJECTION, SOLUTION INTRAMUSCULAR at 15:24

## 2022-08-17 RX ADMIN — ACETAMINOPHEN 650 MG: 325 TABLET ORAL at 23:13

## 2022-08-17 RX ADMIN — ACETAMINOPHEN 650 MG: 325 TABLET ORAL at 15:24

## 2022-08-17 RX ADMIN — ACETAMINOPHEN 975 MG: 325 TABLET ORAL at 04:29

## 2022-08-17 RX ADMIN — PROPOFOL 140 MCG/KG/MIN: 10 INJECTION, EMULSION INTRAVENOUS at 12:42

## 2022-08-17 RX ADMIN — FENTANYL CITRATE 25 MCG: 50 INJECTION, SOLUTION INTRAMUSCULAR; INTRAVENOUS at 13:03

## 2022-08-17 RX ADMIN — ONDANSETRON 4 MG: 2 INJECTION INTRAMUSCULAR; INTRAVENOUS at 12:47

## 2022-08-17 RX ADMIN — FENTANYL CITRATE 25 MCG: 50 INJECTION, SOLUTION INTRAMUSCULAR; INTRAVENOUS at 12:55

## 2022-08-17 RX ADMIN — OXYCODONE HYDROCHLORIDE 5 MG: 5 TABLET ORAL at 18:36

## 2022-08-17 RX ADMIN — FENTANYL CITRATE 25 MCG: 50 INJECTION INTRAMUSCULAR; INTRAVENOUS at 14:16

## 2022-08-17 RX ADMIN — ACETAMINOPHEN 650 MG: 325 TABLET ORAL at 18:17

## 2022-08-17 RX ADMIN — PROPOFOL 200 MG: 10 INJECTION, EMULSION INTRAVENOUS at 12:42

## 2022-08-17 RX ADMIN — METOPROLOL SUCCINATE 25 MG: 25 TABLET, EXTENDED RELEASE ORAL at 10:57

## 2022-08-17 RX ADMIN — KETOROLAC TROMETHAMINE 15 MG: 30 INJECTION, SOLUTION INTRAMUSCULAR at 17:48

## 2022-08-17 RX ADMIN — GABAPENTIN 100 MG: 100 CAPSULE ORAL at 04:28

## 2022-08-17 RX ADMIN — FENTANYL CITRATE 25 MCG: 50 INJECTION, SOLUTION INTRAMUSCULAR; INTRAVENOUS at 12:49

## 2022-08-17 RX ADMIN — DEXAMETHASONE SODIUM PHOSPHATE 10 MG: 10 INJECTION, SOLUTION INTRAMUSCULAR; INTRAVENOUS at 12:47

## 2022-08-17 RX ADMIN — SODIUM CHLORIDE, POTASSIUM CHLORIDE, SODIUM LACTATE AND CALCIUM CHLORIDE 125 ML/HR: 600; 310; 30; 20 INJECTION, SOLUTION INTRAVENOUS at 04:31

## 2022-08-17 RX ADMIN — CELECOXIB 200 MG: 100 CAPSULE ORAL at 04:28

## 2022-08-17 RX ADMIN — LIDOCAINE HYDROCHLORIDE 50 MG: 10 INJECTION, SOLUTION EPIDURAL; INFILTRATION; INTRACAUDAL at 12:42

## 2022-08-17 NOTE — DISCHARGE INSTRUCTIONS
Dilation and Curettage   WHAT YOU NEED TO KNOW:   Dilation and curettage (D&C) is a procedure to remove tissue from the lining of your uterus  DISCHARGE INSTRUCTIONS:   Call your local emergency number (911 in the [address] [facility]) if:   You have signs of an allergic reaction, such as hives, trouble breathing, or severe swelling  Seek care immediately if:   You have heavy vaginal bleeding that soaks 1 pad in 1 hour for 2 hours in a row  You have a fever higher than 100 4°F (38°C)  You have abdominal cramps for more than 2 days  Your pain does not get better, even after treatment  Call your doctor or gynecologist if:   You have foul-smelling vaginal discharge  You do not get your monthly period  You feel depressed or anxious  You feel very tired and weak  You have questions or concerns about your condition or care  Medicines: You may need any of the following:  Prescription pain medicine  may be given  Ask your healthcare provider how to take this medicine safely  Some prescription pain medicines contain acetaminophen  Do not take other medicines that contain acetaminophen without talking to your healthcare provider  Too much acetaminophen may cause liver damage  Prescription pain medicine may cause constipation  Ask your healthcare provider how to prevent or treat constipation  Take your medicine as directed  Contact your healthcare provider if you think your medicine is not helping or if you have side effects  Tell him or her if you are allergic to any medicine  Keep a list of the medicines, vitamins, and herbs you take  Include the amounts, and when and why you take them  Bring the list or the pill bottles to follow-up visits  Carry your medicine list with you in case of an emergency  Self-care:   Use sanitary pads if needed  You may have light bleeding for up to 2 weeks  Do not use tampons  Use sanitary pads instead  This will help prevent a vaginal infection  Rest as needed  Slowly start to do more each day  Return to your daily activities as directed  Do not have sex for at least 2 weeks after the procedure  This will help prevent an infection  Use birth control right after your procedure  Your monthly period should start again in 4 to 8 weeks  During this time, you could still ovulate (release an egg)  Use birth control as directed to prevent pregnancy during this time  Follow up with your doctor or gynecologist as directed: Your uterus will be checked to make sure it is healing  You may also be given results of any tests done on removed tissue  Write down your questions so you remember to ask them during your visits  © [employer] 2022 Information is for End User's use only and may not be sold, redistributed or otherwise used for commercial purposes  All illustrations and images included in CareNotes® are the copyrighted property of [employer]  or [facility] [employer] [facility]   The above information is an  only  It is not intended as medical advice for individual conditions or treatments  Talk to your doctor, nurse or pharmacist before following any medical regimen to see if it is safe and effective for you

## 2022-08-17 NOTE — OP NOTE
OPERATIVE REPORT  PATIENT NAME: Uma Clark    :  1990  MRN: [id number]  Pt Location: AN OR ROOM 01    SURGERY DATE: 2022    Surgeon(s) and Role:     * Sergei Bonilla MD - Primary     * Maxwell Horvath MD - Assisting    Preop Diagnosis:  Abnormal uterine bleeding (AUB) [X41 7]  Pregnancy complicated by intrauterine device (IUD) [O26 30]    Post-Op Diagnosis Codes:     * Abnormal uterine bleeding (AUB) [N93 9]     * Pregnancy complicated by intrauterine device (IUD) [O26 30]    Procedure(s) (LRB):  DILATATION AND CURETTAGE (D&C) WITH HYSTEROSCOPY (N/A)  REMOVAL OF INTRAUTERINE DEVICE (IUD) (N/A)    Specimen(s):  ID Type Source Tests Collected by Time Destination   1 : products of conception Tissue Products of Conception TISSUE EXAM Sergei Bonilla MD 2022 1333        Estimated Blood Loss:   100cc    Operative Indications:  Abnormal uterine bleeding (AUB) [Z17 0]  Pregnancy complicated by intrauterine device (IUD) [O26 30]    Operative Findings:   1  External genitalia grossly normal in appearance  No ulcerations, lacerations, or lesions  2  Vagina and cervix were grossly normal in appearance without any lacerations or lesions  3  Uterus sounded to 12 cm  4  Initial hysteroscopic examination revealed large amounts of retained products of conception in background of proliferative endometrial lining  Bilateral tubal ostia were visualized  5  IUD removed intact with strings wrapped around  6  Following D&E, hysteroscopic examination revealed an empty uterine cavity with no evidence of injury or perforation  Procedure and Technique:  The patient was taken to the operating room  General LMA anesthesia (LMA) was administered  Sequential compression devices were placed, and the patient was positioned on the OR table in the dorsal lithotomy position  All pressure points were padded, and a nellie hugger was placed to maintain control of core body temperature   The patient was prepped and draped in the usual sterile fashion using chlorhexidine  A time out was performed to confirm correct patient and procedure  A straight catheter was introduced into the bladder, which was drained of 600 mL of clear yellow urine  A weighted speculum was inserted into the vagina, and a retractor was used to visualize the anterior lip of the cervix, which was then grasped with a single toothed tenaculum  The uterus was sounded to 12 cm  The cervix was serially dilated for introduction of the hysteroscope  Using the [place] fluid management system, the Symphion device was primed prior to use and then introduced under direct visualization using normal saline solution as the distention media  The retractor was removed from the vagina  The hysteroscope was advanced to the uterine fundus, and the entire uterine cavity was inspected in a systematic manner with findings noted above  The hysteroscope was withdrawn  After inserting the resection device under direct hysteroscopic visualization, a portion of the tissue was excised  During the resection process, there was a large fluid deficit noted despite no complications or signs of perforation  Resection was then halted due to the possibility of absorption vs leakage of fluid into the abdomen through patent fallopian tubes  The hysteroscope was removed, and the specimen was sent to pathology  At this time, polyp forceps were used to gently remove the IUD from the right side of the uterine cavity  Decision was made to convert to D&E for completion of the procedure  The cervix was serially dilated to accommodate the suction device  An 8 cm curved tip was selected  This was advanced to the fundus and suction was activated  The products of conception were collected in the suction trap  The uterus was felt to clamp down  The suction tip was reinserted and activated until no further products of conception were obtained  The uterus was massaged and firm  All instruments were removed from the patient's vagina  There was no bleeding noted from the tenaculum site  The fluid deficit reached approximately 2 5L  At the conclusion of the procedure, all needle, sponge, and instrument counts were correct x2  Dr Luc James was present and participated in all key portions of the case        Patient Disposition:  PACU       SIGNATURE: Manny Rush MD  DATE: August 17, 2022  TIME: 1:48 PM

## 2022-08-17 NOTE — ANESTHESIA POSTPROCEDURE EVALUATION
Post-Op Assessment Note    CV Status:  Stable  Pain Score: 0    Pain management: adequate     Mental Status:  Alert and awake   Hydration Status:  Euvolemic   PONV Controlled:  Controlled   Airway Patency:  Patent      Post Op Vitals Reviewed: Yes      Staff: CRNA         No complications documented      BP   154/79   Temp 97 7   Pulse  67   Resp   14   SpO2   96%

## 2022-08-17 NOTE — PROGRESS NOTES
Gynecology Progress Note  Leidy Search 27 y o  female MRN: [id number]  Unit/Bed#: W -01 Encounter: [id number]    Assessment/Plan:  40-year-old [id number] status post surgical termination of pregnancy on 07/12/2022 after conception with Mirena IUD in place  Presented with single episode of heavy vaginal bleeding on 08/14/2022, stable  By issue:    Ulcerative colitis [facility])  Assessment & Plan  Patient reported that she is not currently taking her medications  Will order if requested     Hypertension  Assessment & Plan  Continue home Metoprolol 25 mg daily    * Abnormal uterine bleeding (AUB)  Assessment & Plan  -Pelvic ultrasound and CTA abdomen/pelvis with concerns for AVM versus retained products of conception  Intact IUD confirmed on CTA  Last beta hCG 46 on 8/15  -Bleeding has remained minimal   Hemoglobin and vital signs are stable  -Continue to monitor vaginal bleeding  -To OR today for hysteroscopy, D&C, removal of pathology and IUD  Surgical consents signed  preopertaive Doxycycline ordered          Subjective:   Renzo Plasencia has no complaints this morning  Still with light vaginal bleeding  No fevers, chills, chest pain, shortness of breath, abdominal/leg or calf pain  Objective:   Vitals: Blood pressure 166/78, pulse 77, temperature 98 3 °F (36 8 °C), temperature source Oral, resp  rate 16, SpO2 96 %, not currently breastfeeding  Physical Exam:   Physical Exam  Vitals reviewed  Constitutional:       General: She is not in acute distress  Appearance: Normal appearance  She is not ill-appearing or toxic-appearing  Cardiovascular:      Rate and Rhythm: Normal rate and regular rhythm  Heart sounds: Normal heart sounds  Pulmonary:      Effort: Pulmonary effort is normal  No respiratory distress  Breath sounds: No wheezing, rhonchi or rales  Abdominal:      General: There is no distension  Palpations: Abdomen is soft  Tenderness: There is no abdominal tenderness   There is no guarding or rebound  Musculoskeletal:         General: Normal range of motion  Skin:     General: Skin is warm and dry  Neurological:      General: No focal deficit present  Mental Status: She is alert and oriented to person, place, and time  Psychiatric:         Mood and Affect: Mood normal          Behavior: Behavior normal          Lab, Imaging and other studies: I have personally reviewed pertinent reports  Lab Results   Component Value Date    WBC 6 06 08/16/2022    HGB 12 2 08/16/2022    HCT 36 9 08/16/2022    MCV 92 08/16/2022     08/16/2022         Jacquelyn Murry MD  PGY II, OB/GYN  8/17/2022, 5:28 AM

## 2022-08-17 NOTE — ANESTHESIA PREPROCEDURE EVALUATION
Procedure:  DILATATION AND CURETTAGE (D&C) WITH HYSTEROSCOPY (N/A Uterus)    Relevant Problems   CARDIO   (+) Hypertension      PULMONARY   (+) Smoker      Digestive   (+) Ulcerative colitis ([address] )      Genitourinary   (+) Abnormal uterine bleeding (AUB)   (+) Pregnancy complicated by intrauterine device (IUD)      Lab Results   Component Value Date    SODIUM 139 08/14/2022    K 4 1 08/14/2022     08/14/2022    CO2 26 08/14/2022    AGAP 7 08/14/2022    BUN 8 08/14/2022    CREATININE 0 57 (L) 08/14/2022    GLUC 89 08/14/2022    CALCIUM 9 4 08/14/2022    AST 16 08/14/2022    ALT 16 08/14/2022    ALKPHOS 94 08/14/2022    TP 7 6 08/14/2022    TBILI 0 29 08/14/2022    EGFR 121 08/14/2022     Lab Results   Component Value Date    WBC 6 06 08/16/2022    HGB 12 2 08/16/2022    HCT 36 9 08/16/2022    MCV 92 08/16/2022     08/16/2022            Anesthesia Plan  ASA Score- 2     Anesthesia Type- general with ASA Monitors  Additional Monitors:   Airway Plan: LMA  Plan Factors-Exercise tolerance (METS): >4 METS  Chart reviewed  EKG reviewed  Imaging results reviewed  Existing labs reviewed  Patient summary reviewed  Induction- intravenous  Postoperative Plan- Plan for postoperative opioid use       Informed Consent-

## 2022-08-18 VITALS
DIASTOLIC BLOOD PRESSURE: 79 MMHG | SYSTOLIC BLOOD PRESSURE: 150 MMHG | TEMPERATURE: 97.8 F | HEART RATE: 87 BPM | OXYGEN SATURATION: 98 % | RESPIRATION RATE: 18 BRPM

## 2022-08-18 PROCEDURE — 99024 POSTOP FOLLOW-UP VISIT: CPT | Performed by: STUDENT IN AN ORGANIZED HEALTH CARE EDUCATION/TRAINING PROGRAM

## 2022-08-18 RX ORDER — IBUPROFEN 600 MG/1
600 TABLET ORAL EVERY 6 HOURS PRN
Qty: 30 TABLET | Refills: 0 | Status: SHIPPED | OUTPATIENT
Start: 2022-08-18

## 2022-08-18 RX ORDER — ACETAMINOPHEN 325 MG/1
650 TABLET ORAL EVERY 6 HOURS SCHEDULED
Qty: 30 TABLET | Refills: 0 | Status: SHIPPED | OUTPATIENT
Start: 2022-08-18

## 2022-08-18 RX ADMIN — IBUPROFEN 600 MG: 600 TABLET ORAL at 03:13

## 2022-08-18 RX ADMIN — METOPROLOL SUCCINATE 25 MG: 25 TABLET, EXTENDED RELEASE ORAL at 08:52

## 2022-08-18 RX ADMIN — ACETAMINOPHEN 650 MG: 325 TABLET ORAL at 11:29

## 2022-08-18 RX ADMIN — ACETAMINOPHEN 650 MG: 325 TABLET ORAL at 05:59

## 2022-08-18 NOTE — PLAN OF CARE
Problem: PAIN - ADULT  Goal: Verbalizes/displays adequate comfort level or baseline comfort level  Description: Interventions:  - Encourage patient to monitor pain and request assistance  - Assess pain using appropriate pain scale  - Administer analgesics based on type and severity of pain and evaluate response  - Implement non-pharmacological measures as appropriate and evaluate response  - Consider cultural and social influences on pain and pain management  - Notify physician/advanced practitioner if interventions unsuccessful or patient reports new pain  Outcome: Progressing     Problem: INFECTION - ADULT  Goal: Absence or prevention of progression during hospitalization  Description: INTERVENTIONS:  - Assess and monitor for signs and symptoms of infection  - Monitor lab/diagnostic results  - Monitor all insertion sites, i e  indwelling lines, tubes, and drains  - Monitor endotracheal if appropriate and nasal secretions for changes in amount and color  - [place] appropriate cooling/warming therapies per order  - Administer medications as ordered  - Instruct and encourage patient and family to use good hand hygiene technique  - Identify and instruct in appropriate isolation precautions for identified infection/condition  Outcome: Progressing     Problem: DISCHARGE PLANNING  Goal: Discharge to home or other facility with appropriate resources  Description: INTERVENTIONS:  - Identify barriers to discharge w/patient and caregiver  - Arrange for needed discharge resources and transportation as appropriate  - Identify discharge learning needs (meds, wound care, etc )  - Arrange for interpretive services to assist at discharge as needed  - Refer to Case Management Department for coordinating discharge planning if the patient needs post-hospital services based on physician/advanced practitioner order or complex needs related to functional status, cognitive ability, or social support system  Outcome: Progressing Problem: Knowledge Deficit  Goal: Patient/family/caregiver demonstrates understanding of disease process, treatment plan, medications, and discharge instructions  Description: Complete learning assessment and assess knowledge base    Interventions:  - Provide teaching at level of understanding  - Provide teaching via preferred learning methods  Outcome: Progressing     Problem: Potential for Falls  Goal: Patient will remain free of falls  Description: INTERVENTIONS:  - Educate patient/family on patient safety including physical limitations  - Instruct patient to call for assistance with activity   - Consult OT/PT to assist with strengthening/mobility   - Keep Call bell within reach  - Keep bed low and locked with side rails adjusted as appropriate  - Keep care items and personal belongings within reach  - Initiate and maintain comfort rounds  - Make Fall Risk Sign visible to staff  - Offer Toileting every  Hours, in advance of need  - Initiate/Maintain alarm  - Obtain necessary fall risk management equipment  - Apply yellow socks and bracelet for high fall risk patients  - Consider moving patient to room near nurses station  Outcome: Progressing Alert and oriented to person, place, time/situation. normal mood and affect. no apparent risk to self or others.

## 2022-08-18 NOTE — PLAN OF CARE
Problem: PAIN - ADULT  Goal: Verbalizes/displays adequate comfort level or baseline comfort level  Description: Interventions:  - Encourage patient to monitor pain and request assistance  - Assess pain using appropriate pain scale  - Administer analgesics based on type and severity of pain and evaluate response  - Implement non-pharmacological measures as appropriate and evaluate response  - Consider cultural and social influences on pain and pain management  - Notify physician/advanced practitioner if interventions unsuccessful or patient reports new pain  Outcome: Progressing     Problem: INFECTION - ADULT  Goal: Absence or prevention of progression during hospitalization  Description: INTERVENTIONS:  - Assess and monitor for signs and symptoms of infection  - Monitor lab/diagnostic results  - Monitor all insertion sites, i e  indwelling lines, tubes, and drains  - Monitor endotracheal if appropriate and nasal secretions for changes in amount and color  - [place] appropriate cooling/warming therapies per order  - Administer medications as ordered  - Instruct and encourage patient and family to use good hand hygiene technique  - Identify and instruct in appropriate isolation precautions for identified infection/condition  Outcome: Progressing     Problem: DISCHARGE PLANNING  Goal: Discharge to home or other facility with appropriate resources  Description: INTERVENTIONS:  - Identify barriers to discharge w/patient and caregiver  - Arrange for needed discharge resources and transportation as appropriate  - Identify discharge learning needs (meds, wound care, etc )  - Arrange for interpretive services to assist at discharge as needed  - Refer to Case Management Department for coordinating discharge planning if the patient needs post-hospital services based on physician/advanced practitioner order or complex needs related to functional status, cognitive ability, or social support system  Outcome: Progressing Problem: Knowledge Deficit  Goal: Patient/family/caregiver demonstrates understanding of disease process, treatment plan, medications, and discharge instructions  Description: Complete learning assessment and assess knowledge base    Interventions:  - Provide teaching at level of understanding  - Provide teaching via preferred learning methods  Outcome: Progressing     Problem: Potential for Falls  Goal: Patient will remain free of falls  Description: INTERVENTIONS:  - Educate patient/family on patient safety including physical limitations  - Instruct patient to call for assistance with activity   - Consult OT/PT to assist with strengthening/mobility   - Keep Call bell within reach  - Keep bed low and locked with side rails adjusted as appropriate  - Keep care items and personal belongings within reach  - Initiate and maintain comfort rounds  - Make Fall Risk Sign visible to staff  - Offer Toileting every  Hours, in advance of need  - Initiate/Maintain alarm  - Obtain necessary fall risk management equipment  - Apply yellow socks and bracelet for high fall risk patients  - Consider moving patient to room near nurses station  Outcome: Progressing

## 2022-08-18 NOTE — PROGRESS NOTES
Gynecology Progress note   David Espinal 27 y o  female MRN: [id number]  Unit/Bed#: W -01 Encounter: [id number]    Assessment/Plan:    Ms Karl Hays is a 27 y o  Case Henry Day: 5, admitted status post surgical termination of pregnancy on 07/12/2022 after conception with Mirena IUD in place    * Abnormal uterine bleeding (AUB)  Assessment & Plan  - s/p hysteroscopy, D&C, Mirena IUD removal which confirmed retained products of conception  - Discussed operative findings  - Pain well managed with motrin  - Mood: stable  - Discussed future fertility goals this morning  - Dispo: home today    Ulcerative colitis ([facility] [address] )  Assessment & Plan  Patient reported that she is not currently taking her medications  Will order if requested     Hypertension  Assessment & Plan  Continue home Metoprolol 25 mg daily     Discussed at board signout w/ on call [address] attending physician    Subjective:    David Espinal has no current complaints  Pain is well controlled with tylenol  Patient is currently voiding  She is ambulating  She is tolerating PO, and denies nausea or vomitting  Patient denies fever, chills, chest pain, shortness of breath, or calf tenderness  She feels relieved that she was able to retain future fertility  We discussed PCOS this morning (she had irregular periods as a teenager plus hirsutism) and that she may not get a normal menstrual cycle next month if she is anovulatory  Objective:  /62 (BP Location: Right arm)   Pulse 60   Temp (!) 97 3 °F (36 3 °C) (Oral)   Resp 16   SpO2 98%     I/O last 3 completed shifts: In: 850 [I V :850]  Out: 600 [Urine:600]  No intake/output data recorded      Lab Results   Component Value Date    WBC 6 06 08/16/2022    HGB 12 2 08/16/2022    HCT 36 9 08/16/2022    MCV 92 08/16/2022     08/16/2022       Lab Results   Component Value Date    CALCIUM 9 4 08/14/2022    K 4 1 08/14/2022    CO2 26 08/14/2022     08/14/2022    BUN 8 08/14/2022 CREATININE 0 57 (L) 08/14/2022       GEN: The patient was alert and oriented x3, pleasant well-appearing female in no acute distress     CV: Regular rate  PULM: nonlabored respirations  MSK: Normal gait  Skin: warm, dry  Neuro: no focal deficits  Psych: normal affect and judgement, cooperative      Gilda Peguero DO  8/18/2022  6:54 AM

## 2022-08-19 NOTE — UTILIZATION REVIEW
Notification of Discharge   This is a Notification of Discharge from our facility [address]  Please be advised that this patient has been discharge from our facility  Below you will find the admission and discharge date and time including the patients disposition  UTILIZATION REVIEW CONTACT:  Iraida Gibson MA  Utilization   Network Utilization Review Department  Phone: [phone] x carefully listen to the prompts  All voicemails are confidential   Email: [email or web address]     PHYSICIAN ADVISORY SERVICES:  FOR HATZ-FS-PBHX REVIEW - MEDICAL NECESSITY DENIAL  Phone: [phone]  Fax: [phone]  Email: [email or web address]     PRESENTATION DATE: 8/14/2022 10:06 PM  OBERVATION ADMISSION DATE:   INPATIENT ADMISSION DATE: 8/14/22 10:06 PM   DISCHARGE DATE: 8/18/2022  4:24 PM  DISPOSITION: Home/Self Care Home/Self Care      IMPORTANT INFORMATION:  Send all requests for admission clinical reviews, approved or denied determinations and any other requests to dedicated fax number below belonging to the campus where the patient is receiving treatment   List of dedicated fax numbers:  [address] DENIALS (Administrative/Medical Necessity) [phone]   [address] (Maternity/NICU/Pediatrics) [phone]   Maren White [phone]   [address] [place] [address] [phone]   [address] [place] [address] [phone]   [address] [place] [address],[address] [phone]   [facility]  [phone]   [address] [place], [place] [address] [facility] [address] [facility] [phone]

## 2022-08-29 ENCOUNTER — OFFICE VISIT (OUTPATIENT)
Dept: OBGYN CLINIC | Facility: CLINIC | Age: 32
End: 2022-08-29

## 2022-08-29 VITALS
BODY MASS INDEX: 27.28 KG/M2 | DIASTOLIC BLOOD PRESSURE: 90 MMHG | WEIGHT: 180 LBS | HEIGHT: 68 IN | SYSTOLIC BLOOD PRESSURE: 134 MMHG

## 2022-08-29 DIAGNOSIS — Z48.89 POSTOPERATIVE VISIT: Primary | ICD-10-CM

## 2022-08-29 PROCEDURE — 99024 POSTOP FOLLOW-UP VISIT: CPT | Performed by: OBSTETRICS & GYNECOLOGY

## 2022-08-29 NOTE — PROGRESS NOTES
Assessment:    S/p D&C/HSC/IUD retrieval   Doing well postoperatively  Operative findings again reviewed  Pathology report discussed  Plan:    1  Continue any current medications  2  Wound care discussed  3  Activity restrictions: none  4  Anticipated return to work: not applicable  5  Follow up: has appointment with Dr Remy Braga to discuss PCOS thi week; follow up for annual afterwards    Molly Tabor is a 27 y o  female who presents to the clinic 2 weeks status post D&C/HSC/IUD retrieval for retained POC and IUD  Eating a regular diet without difficulty  Bowel movements are normal  The patient is not having any pain  The following portions of the patient's history were reviewed and updated as appropriate: allergies, current medications, past family history, past medical history, past social history, past surgical history and problem list     Review of Systems  Pertinent items are noted in HPI        Objective     /90 (BP Location: Left arm, Patient Position: Sitting, Cuff Size: Standard)   Ht 5' 8 25" (1 734 m)   Wt 81 6 kg (180 lb)   LMP 04/18/2022 (Approximate)   BMI 27 17 kg/m²   General:   Heart:  Lungs: alert and oriented, in no acute distress   Regular rate and rhythm  Effort normal

## 2022-11-15 ENCOUNTER — OFFICE VISIT (OUTPATIENT)
Dept: OBGYN CLINIC | Facility: CLINIC | Age: 32
End: 2022-11-15

## 2022-11-15 DIAGNOSIS — N92.6 IRREGULAR MENSES: Primary | ICD-10-CM

## 2022-11-21 PROBLEM — O26.30: Status: RESOLVED | Noted: 2022-07-06 | Resolved: 2022-11-21

## 2022-11-22 NOTE — PROGRESS NOTES
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 presents with irregular menses as well as inability to conceive  Has seen Dr Radha Tran; received unclear instructions regarding testing  Just had surgery for IUD retrieval/retained POC s/p EAb 2022  Had Mirena inserted 2022  Historical Information   Past Medical History:   Diagnosis Date   • Abnormal Pap smear of cervix    • Gonorrhea    • History of positive PPD    • Hypertension    • Hypothyroid    • Ulcerative colitis ([facility] [address] )      Past Surgical History:   Procedure Laterality Date   • APPENDECTOMY     • CT HYSTEROSCOPY,W/ENDO BX N/A 2022    Procedure: DILATATION AND CURETTAGE (D&C) WITH HYSTEROSCOPY;  Surgeon: Mignon Iverson MD;  [facility];  Service: Gynecology   • REMOVAL OF INTRAUTERINE DEVICE (IUD) N/A 2022    Procedure: REMOVAL OF INTRAUTERINE DEVICE (IUD);   Surgeon: Mignon Iverson MD;  [facility];  Service: Gynecology     OB/GYN History:   OB History        3    Para   2    Term   2       0    AB   1    Living   2       SAB   0    IAB   1    Ectopic   0    Multiple   0    Live Births   2                 Family History   Problem Relation Age of Onset   • Diabetes Mother    • Lupus Mother      Social History   Social History     Substance and Sexual Activity   Alcohol Use Yes    Comment: rare     Social History     Substance and Sexual Activity   Drug Use Not Currently   • Types: Marijuana     Social History     Tobacco Use Smoking Status Former   • Packs/day: 0 00   • Years: 5 00   • Pack years: 0 00   • Types: Cigars, Cigarettes   • Start date: 2015   • Quit date: 2022   • Years since quittin 3   Smokeless Tobacco Never     E-Cigarette/Vaping   • E-Cigarette Use Never User      E-Cigarette/Vaping Substances       Meds/Allergies   Current Outpatient Medications on File Prior to Visit   Medication Sig   • acetaminophen (TYLENOL) 325 mg tablet Take 2 tablets (650 mg total) by mouth every 6 (six) hours   • metoprolol succinate (TOPROL-XL) 25 mg 24 hr tablet Take 25 mg by mouth daily     No current facility-administered medications on file prior to visit  Allergies   Allergen Reactions   • Shellfish Allergy - Food Allergy Other (See Comments) and Rash     Itching and swelling of mouth & lips     ROS:  negative    Objective   Vitals: Last menstrual period 10/08/2022  Physical Exam  Constitutional:       Appearance: Normal appearance  HENT:      Head: Normocephalic  Pulmonary:      Effort: Pulmonary effort is normal    Musculoskeletal:         General: No swelling  Comments: RLE in surgical dressing   Neurological:      General: No focal deficit present  Mental Status: She is alert and oriented to person, place, and time  Skin:     General: Skin is warm and dry  Coloration: Skin is not pale  Psychiatric:         Mood and Affect: Mood normal          Behavior: Behavior normal    Vitals reviewed

## 2023-01-25 ENCOUNTER — OFFICE VISIT (OUTPATIENT)
Dept: OBGYN CLINIC | Facility: CLINIC | Age: 33
End: 2023-01-25

## 2023-01-25 VITALS
BODY MASS INDEX: 30.77 KG/M2 | DIASTOLIC BLOOD PRESSURE: 104 MMHG | HEIGHT: 68 IN | WEIGHT: 203 LBS | SYSTOLIC BLOOD PRESSURE: 170 MMHG

## 2023-01-25 DIAGNOSIS — R10.2 PELVIC PAIN: ICD-10-CM

## 2023-01-25 DIAGNOSIS — B37.49 CANDIDA INFECTION OF GENITAL REGION: Primary | ICD-10-CM

## 2023-01-25 PROBLEM — N93.9 ABNORMAL UTERINE BLEEDING (AUB): Status: RESOLVED | Noted: 2022-08-14 | Resolved: 2023-01-25

## 2023-01-25 RX ORDER — FLUCONAZOLE 150 MG/1
150 TABLET ORAL ONCE
Qty: 2 TABLET | Refills: 0 | Status: SHIPPED | OUTPATIENT
Start: 2023-01-25 | End: 2023-01-25
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
## 2023-01-25 NOTE — PATIENT INSTRUCTIONS
Yeast Infection   WHAT YOU NEED TO KNOW:   What is a yeast infection? A yeast infection, or vaginal candidiasis, is a common vaginal infection  A yeast infection is caused by a fungus, or yeast-like germ  Fungi are normally found in your vagina  Too many fungi can cause an infection  What increases my risk for a yeast infection? Pregnancy    Medicines, such as antibiotics, birth control pills, or steroid medicine    Medical conditions, such as diabetes    Contraceptive devices, such as diaphragms, sponges, and intrauterine devices    What are the signs and symptoms of a yeast infection? Thick, white, cheese-like discharge from your vagina    Itching, swelling, and redness in your vagina    Pain or burning when you urinate    Pain during sexual intercourse    How is a yeast infection diagnosed and treated? Your healthcare provider will ask about your medical history and examine you  A sample of your vaginal discharge may show what germ is causing your infection  Medicines help treat the fungal infection and decrease inflammation  The medicine may be a pill, cream, ointment, or vaginal tablet or suppository  With treatment, the infection is usually gone within a week  What can I do to keep my vagina healthy? Clean your genital area with mild soap and warm water each day  Do not get soap inside your vagina  Gently dry the area after washing  Do not use hot tubs  The heat and moisture from hot tubs can increase your risk for another yeast infection  Always wipe from front to back  after you use the toilet  This prevents spreading bacteria from your rectal area into your vagina  Do not wear tight-fitting clothes or undergarments  for long periods of time  Wear cotton underwear during the day  [place]otton helps keep your genital area dry and does not hold in warmth or moisture  Do not wear underwear at night  Do not douche  or use feminine hygiene sprays or bubble bath   Do not use pads or tampons that are scented, or colored or perfumed toilet paper  Do not have sex until your symptoms go away  Have your partner wear a condom until you complete your course of medication  Ask your healthcare provider about birth control options if necessary  Condoms have latex and diaphragms have gel that kills sperm  Both of these may irritate your genital area  When should I call my doctor or gynecologist?   Toi Alexander have a fever and chills  You develop abdominal or pelvic pain  Your discharge is bloody and it is not your monthly period  Your signs and symptoms get worse, even after treatment  You have questions or concerns about your condition or care  CARE AGREEMENT:   You have the right to help plan your care  Learn about your health condition and how it may be treated  Discuss treatment options with your healthcare providers to decide what care you want to receive  You always have the right to refuse treatment  The above information is an  only  It is not intended as medical advice for individual conditions or treatments  Talk to your doctor, nurse or pharmacist before following any medical regimen to see if it is safe and effective for you  © Copyright [address] 2022 Information is for End User's use only and may not be sold, redistributed or otherwise used for commercial purposes  All illustrations and images included in CareNotes® are the copyrighted property of A D A M , Inc  or Clarence Dawson  Pelvic Pain in Women   AMBULATORY CARE:   Pelvic pain  may happen on one or both sides of your pelvis  Pelvic pain may occur with certain body positions or activities, such as when you have sex or a bowel movement  It may worsen during your monthly period or after you sit or stand for a long time  Chronic pelvic pain is pain that continues for longer than 6 months  Call 911 for any of the following: You have severe chest pain and sudden trouble breathing        Seek care immediately if:   You have heavy or unusual vaginal bleeding, and you feel lightheaded or faint  Contact your healthcare provider if:   You have pelvic pain that does not go away after you take pain medicine  You develop new symptoms or your symptoms are worse than before  You have questions or concerns about your condition or care  Medicines: You may need any of the following:  Pain medicine  may be given in pills or creams to relieve your pain  Hormones  may be given if your pain gets worse with your menstrual cycle  Antibiotics  may be given if your pain is caused by infection  Take your medicine as directed  Contact your healthcare provider if you think your medicine is not helping or if you have side effects  Tell him or her if you are allergic to any medicine  Keep a list of the medicines, vitamins, and herbs you take  Include the amounts, and when and why you take them  Bring the list or the pill bottles to follow-up visits  Carry your medicine list with you in case of an emergency  Self-care:   Keep a pain diary  Write down when your pain happens, how severe it is, and any other symptoms you have with your pain  A diary will help you keep track of pain cycles  It may also help your healthcare provider find out what is causing your pain  Learn ways to relax  Deep breathing, meditation, and relaxation techniques can help decrease your pain  When you are tense, your pain may increase  Change the foods you eat if you have irritable bowel syndrome  Ask your healthcare provider about the best foods for you  Follow up with your doctor as directed:  Write down your questions so you remember to ask them during your visits  © Copyright [employer] 2022 Information is for End User's use only and may not be sold, redistributed or otherwise used for commercial purposes   All illustrations and images included in CareNotes® are the copyrighted property of A D A M , Inc  or [employer]  The above information is an  only  It is not intended as medical advice for individual conditions or treatments  Talk to your doctor, nurse or pharmacist before following any medical regimen to see if it is safe and effective for you

## 2023-01-28 ENCOUNTER — APPOINTMENT (EMERGENCY)
Dept: RADIOLOGY | Facility: HOSPITAL | Age: 33
End: 2023-01-28

## 2023-01-28 ENCOUNTER — HOSPITAL ENCOUNTER (EMERGENCY)
Facility: HOSPITAL | Age: 33
Discharge: HOME | End: 2023-01-28
Attending: EMERGENCY MEDICINE

## 2023-01-28 VITALS
TEMPERATURE: 98.4 F | OXYGEN SATURATION: 99 % | BODY MASS INDEX: 31.54 KG/M2 | HEART RATE: 86 BPM | DIASTOLIC BLOOD PRESSURE: 87 MMHG | WEIGHT: 208.11 LBS | SYSTOLIC BLOOD PRESSURE: 135 MMHG | HEIGHT: 68 IN | RESPIRATION RATE: 18 BRPM

## 2023-01-28 DIAGNOSIS — R07.89 ATYPICAL CHEST PAIN: Primary | ICD-10-CM

## 2023-01-28 LAB
ALBUMIN SERPL BCP-MCNC: 3.5 G/DL (ref 3.5–5)
ALP SERPL-CCNC: 84 U/L (ref 46–116)
ALT SERPL W P-5'-P-CCNC: 14 U/L (ref 12–78)
ANION GAP SERPL CALCULATED.3IONS-SCNC: 8 MMOL/L (ref 4–13)
AST SERPL W P-5'-P-CCNC: 19 U/L (ref 5–45)
BASOPHILS # BLD AUTO: 0.04 THOUSANDS/ÂΜL (ref 0–0.1)
BASOPHILS NFR BLD AUTO: 1 % (ref 0–1)
BILIRUB SERPL-MCNC: 0.21 MG/DL (ref 0.2–1)
BILIRUB UR QL STRIP: NEGATIVE
BUN SERPL-MCNC: 9 MG/DL (ref 5–25)
CALCIUM SERPL-MCNC: 9 MG/DL (ref 8.3–10.1)
CARDIAC TROPONIN I PNL SERPL HS: <2 NG/L
CARDIAC TROPONIN I PNL SERPL HS: <2 NG/L
CHLORIDE SERPL-SCNC: 107 MMOL/L (ref 96–108)
CLARITY UR: CLEAR
CO2 SERPL-SCNC: 25 MMOL/L (ref 21–32)
COLOR UR: NORMAL
CREAT SERPL-MCNC: 0.62 MG/DL (ref 0.6–1.3)
EOSINOPHIL # BLD AUTO: 0.36 THOUSAND/ÂΜL (ref 0–0.61)
EOSINOPHIL NFR BLD AUTO: 6 % (ref 0–6)
ERYTHROCYTE [DISTWIDTH] IN BLOOD BY AUTOMATED COUNT: 13.2 % (ref 11.6–15.1)
EXT PREGNANCY TEST URINE: NEGATIVE
EXT. CONTROL: NORMAL
GFR SERPL CREATININE-BSD FRML MDRD: 117 ML/MIN/1.73SQ M
GLUCOSE SERPL-MCNC: 109 MG/DL (ref 65–140)
GLUCOSE UR STRIP-MCNC: NEGATIVE MG/DL
HCT VFR BLD AUTO: 39.3 % (ref 34.8–46.1)
HGB BLD-MCNC: 12.9 G/DL (ref 11.5–15.4)
HGB UR QL STRIP.AUTO: NEGATIVE
IMM GRANULOCYTES # BLD AUTO: 0.01 THOUSAND/UL (ref 0–0.2)
IMM GRANULOCYTES NFR BLD AUTO: 0 % (ref 0–2)
KETONES UR STRIP-MCNC: NEGATIVE MG/DL
LEUKOCYTE ESTERASE UR QL STRIP: NEGATIVE
LYMPHOCYTES # BLD AUTO: 1.66 THOUSANDS/ÂΜL (ref 0.6–4.47)
LYMPHOCYTES NFR BLD AUTO: 28 % (ref 14–44)
MCH RBC QN AUTO: 29.7 PG (ref 26.8–34.3)
MCHC RBC AUTO-ENTMCNC: 32.8 G/DL (ref 31.4–37.4)
MCV RBC AUTO: 91 FL (ref 82–98)
MONOCYTES # BLD AUTO: 0.68 THOUSAND/ÂΜL (ref 0.17–1.22)
MONOCYTES NFR BLD AUTO: 12 % (ref 4–12)
NEUTROPHILS # BLD AUTO: 3.13 THOUSANDS/ÂΜL (ref 1.85–7.62)
NEUTS SEG NFR BLD AUTO: 53 % (ref 43–75)
NITRITE UR QL STRIP: NEGATIVE
NRBC BLD AUTO-RTO: 0 /100 WBCS
PH UR STRIP.AUTO: 7 [PH]
PLATELET # BLD AUTO: 234 THOUSANDS/UL (ref 149–390)
PMV BLD AUTO: 10.6 FL (ref 8.9–12.7)
POTASSIUM SERPL-SCNC: 3.6 MMOL/L (ref 3.5–5.3)
PROT SERPL-MCNC: 7.2 G/DL (ref 6.4–8.4)
PROT UR STRIP-MCNC: NEGATIVE MG/DL
RBC # BLD AUTO: 4.34 MILLION/UL (ref 3.81–5.12)
SODIUM SERPL-SCNC: 140 MMOL/L (ref 135–147)
SP GR UR STRIP.AUTO: 1.01 (ref 1–1.03)
UROBILINOGEN UR STRIP-ACNC: <2 MG/DL
WBC # BLD AUTO: 5.88 THOUSAND/UL (ref 4.31–10.16)

## 2023-01-28 RX ORDER — ACETAMINOPHEN 325 MG/1
650 TABLET ORAL ONCE
Status: COMPLETED | OUTPATIENT
Start: 2023-01-28 | End: 2023-01-28

## 2023-01-28 RX ORDER — SODIUM CHLORIDE 9 MG/ML
3 INJECTION INTRAVENOUS
Status: DISCONTINUED | OUTPATIENT
Start: 2023-01-28 | End: 2023-01-28 | Stop reason: HOSPADM

## 2023-01-28 RX ADMIN — ACETAMINOPHEN 650 MG: 325 TABLET, FILM COATED ORAL at 15:22

## 2023-01-28 NOTE — ED PROVIDER NOTES
History  Chief Complaint   Patient presents with   • Chest Pain     Pt c/o left sided chest pain that started 1 hr ago which lasted approx 2 minutes     Patient is a 57-year-old female with past medical history of hypertension, hypothyroidism and ulcerative colitis who presents to the ER with chest pain that started around 11am   Patient states that she was sitting and eating breakfast which included waffles, eggs and turkey when she all of a sudden had chest pain that went away then came back radiating to her left arm which resolved in a couple minutes  She felt short of breath with the chest pain and had an episode of diarrhea  She currently feels like she has diarrhea  She went to go lay down and when she sat up she became very lightheaded  She got out of bed and continue to feel lightheaded and decided to call EMS  With breakfast, she took multiple multivitamins and supplements and also took her metoprolol after she had chest pain  She has been trying to conceive and her last office visit with OB/GYN was a couple days ago  She did not have an urinalysis and urine pregnancy test but her vaginal exam looks like yeast infection which she is being treated with Diflucan  She has been having left pelvic pain  Her last menstrual period was the first week of January  Endorses urinary frequency without dysuria  She denies nausea, vomiting, syncope, current shortness of breath or lightheadedness  Prior to Admission Medications   Prescriptions Last Dose Informant Patient Reported?  Taking?   acetaminophen (TYLENOL) 325 mg tablet   No No   Sig: Take 2 tablets (650 mg total) by mouth every 6 (six) hours   metoprolol succinate (TOPROL-XL) 25 mg 24 hr tablet   Yes No   Sig: Take 25 mg by mouth daily      Facility-Administered Medications: None       Past Medical History:   Diagnosis Date   • Abnormal Pap smear of cervix    • Abnormal uterine bleeding (AUB) 8/14/2022   • Gonorrhea    • History of positive PPD • Hypertension    • Hypothyroid    • Ulcerative colitis [facility])        Past Surgical History:   Procedure Laterality Date   • APPENDECTOMY     • RI HYSTEROSCOPY BX ENDOMETRIUM&/POLYPC W/WO D&C N/A 2022    Procedure: DILATATION AND CURETTAGE (D&C) WITH HYSTEROSCOPY;  Surgeon: Hayder Baum MD;  [facility];  Service: Gynecology   • REMOVAL OF INTRAUTERINE DEVICE (IUD) N/A 2022    Procedure: REMOVAL OF INTRAUTERINE DEVICE (IUD); Surgeon: Hayder Baum MD;  [facility];  Service: Gynecology       Family History   Problem Relation Age of Onset   • Diabetes Mother    • Lupus Mother      I have reviewed and agree with the history as documented  E-Cigarette/Vaping   • E-Cigarette Use Never User      E-Cigarette/Vaping Substances     Social History     Tobacco Use   • Smoking status: Former     Packs/day: 0 00     Years: 5 00     Pack years: 0 00     Types: Cigars, Cigarettes     Start date: 2015     Quit date: 2022     Years since quittin 4   • Smokeless tobacco: Never   Vaping Use   • Vaping Use: Never used   Substance Use Topics   • Alcohol use: Yes     Comment: rare   • Drug use: Not Currently     Types: Marijuana       Review of Systems   Constitutional: Negative for chills and fever  HENT: Negative for congestion, ear pain and sore throat  Eyes: Negative for pain and visual disturbance  Respiratory: Positive for shortness of breath  Negative for cough  Cardiovascular: Positive for chest pain  Negative for palpitations and leg swelling  Gastrointestinal: Positive for diarrhea  Negative for abdominal pain, blood in stool, nausea and vomiting  Genitourinary: Positive for frequency  Negative for dysuria and hematuria  Musculoskeletal: Negative for arthralgias and back pain  Skin: Negative for color change and rash  Neurological: Positive for light-headedness  Negative for dizziness, seizures, syncope and headaches     All other systems reviewed and are negative  Physical Exam  Physical Exam  Vitals and nursing note reviewed  Constitutional:       General: She is not in acute distress  Appearance: Normal appearance  She is well-developed  She is not ill-appearing  HENT:      Head: Normocephalic and atraumatic  Eyes:      Extraocular Movements: Extraocular movements intact  Conjunctiva/sclera: Conjunctivae normal    Cardiovascular:      Rate and Rhythm: Normal rate and regular rhythm  Heart sounds: Normal heart sounds  No murmur heard  Pulmonary:      Effort: Pulmonary effort is normal  No respiratory distress  Breath sounds: Normal breath sounds  Abdominal:      General: Bowel sounds are normal       Palpations: Abdomen is soft  Tenderness: There is abdominal tenderness in the suprapubic area and left lower quadrant  There is no guarding  Musculoskeletal:         General: No swelling  Cervical back: Neck supple  Right lower leg: No edema  Left lower leg: No edema  Skin:     General: Skin is warm and dry  Capillary Refill: Capillary refill takes less than 2 seconds  Neurological:      General: No focal deficit present  Mental Status: She is alert and oriented to person, place, and time     Psychiatric:         Mood and Affect: Mood normal          Behavior: Behavior normal          Vital Signs  ED Triage Vitals   Temperature Pulse Respirations Blood Pressure SpO2   01/28/23 1240 01/28/23 1240 01/28/23 1240 01/28/23 1240 01/28/23 1240   98 4 °F (36 9 °C) 88 17 152/72 99 %      Temp Source Heart Rate Source Patient Position - Orthostatic VS BP Location FiO2 (%)   01/28/23 1240 01/28/23 1524 01/28/23 1240 01/28/23 1240 --   Oral Monitor Sitting Left arm       Pain Score       01/28/23 1522       4           Vitals:    01/28/23 1240 01/28/23 1524   BP: 152/72 135/87   Pulse: 88 86   Patient Position - Orthostatic VS: Sitting Sitting         Visual Acuity      ED Medications  Medications sodium chloride (PF) 0 9 % injection 3 mL (has no administration in time range)   acetaminophen (TYLENOL) tablet 650 mg (650 mg Oral Given 1/28/23 1522)       Diagnostic Studies  Results Reviewed     Procedure Component Value Units Date/Time    HS Troponin I 2hr [[id number]] Collected: 01/28/23 1508    Lab Status: Final result Specimen: Blood from Arm, Right Updated: 01/28/23 1537     hs TnI 2hr <2 ng/L      Delta 2hr hsTnI --    POCT pregnancy, urine [[id number]]  (Normal) Resulted: 01/28/23 1445    Lab Status: Final result Updated: 01/28/23 1445     EXT Preg Test, Ur Negative     Control Valid    UA w Reflex to Microscopic w Reflex to Culture [[id number]] Collected: 01/28/23 1336    Lab Status: Final result Specimen: Urine, Clean Catch Updated: 01/28/23 1345     Color, UA Light Yellow     Clarity, UA Clear     Specific Gravity, UA 1 006     pH, UA 7 0     Leukocytes, UA Negative     Nitrite, UA Negative     Protein, UA Negative mg/dl      Glucose, UA Negative mg/dl      Ketones, UA Negative mg/dl      Urobilinogen, UA <2 0 mg/dl      Bilirubin, UA Negative     Occult Blood, UA Negative    HS Troponin 0hr (reflex protocol) [[id number]]  (Normal) Collected: 01/28/23 1248    Lab Status: Final result Specimen: Blood from Arm, Right Updated: 01/28/23 1327     hs TnI 0hr <2 ng/L     Comprehensive metabolic panel [[id number]] Collected: 01/28/23 1248    Lab Status: Final result Specimen: Blood from Arm, Right Updated: 01/28/23 1318     Sodium 140 mmol/L      Potassium 3 6 mmol/L      Chloride 107 mmol/L      CO2 25 mmol/L      ANION GAP 8 mmol/L      BUN 9 mg/dL      Creatinine 0 62 mg/dL      Glucose 109 mg/dL      Calcium 9 0 mg/dL      AST 19 U/L      ALT 14 U/L      Alkaline Phosphatase 84 U/L      Total Protein 7 2 g/dL      Albumin 3 5 g/dL      Total Bilirubin 0 21 mg/dL      eGFR 117 ml/min/1 73sq m     Narrative:      Rosy guidelines for Chronic Kidney Disease (CKD):   •  Stage 1 with normal or high GFR (GFR > 90 mL/min/1 73 square meters)  •  Stage 2 Mild CKD (GFR = 60-89 mL/min/1 73 square meters)  •  Stage 3A Moderate CKD (GFR = 45-59 mL/min/1 73 square meters)  •  Stage 3B Moderate CKD (GFR = 30-44 mL/min/1 73 square meters)  •  Stage 4 Severe CKD (GFR = 15-29 mL/min/1 73 square meters)  •  Stage 5 End Stage CKD (GFR <15 mL/min/1 73 square meters)  Note: GFR calculation is accurate only with a steady state creatinine    CBC and differential [[id number]] Collected: 01/28/23 1248    Lab Status: Final result Specimen: Blood from Arm, Right Updated: 01/28/23 1257     WBC 5 88 Thousand/uL      RBC 4 34 Million/uL      Hemoglobin 12 9 g/dL      Hematocrit 39 3 %      MCV 91 fL      MCH 29 7 pg      MCHC 32 8 g/dL      RDW 13 2 %      MPV 10 6 fL      Platelets 413 Thousands/uL      nRBC 0 /100 WBCs      Neutrophils Relative 53 %      Immat GRANS % 0 %      Lymphocytes Relative 28 %      Monocytes Relative 12 %      Eosinophils Relative 6 %      Basophils Relative 1 %      Neutrophils Absolute 3 13 Thousands/µL      Immature Grans Absolute 0 01 Thousand/uL      Lymphocytes Absolute 1 66 Thousands/µL      Monocytes Absolute 0 68 Thousand/µL      Eosinophils Absolute 0 36 Thousand/µL      Basophils Absolute 0 04 Thousands/µL                  XR chest 1 view portable   Final Result by Wagner Matt MD (01/28 1431)      No acute cardiopulmonary disease                    Workstation performed: [id number]                    Procedures  Procedures         ED Course             HEART Risk Score    Flowsheet Row Most Recent Value   Heart Score Risk Calculator    History 0 Filed at: 01/28/2023 1530   ECG 0 Filed at: 01/28/2023 1530   Age 0 Filed at: 01/28/2023 1530   Risk Factors 1 Filed at: 01/28/2023 1530   Troponin 0 Filed at: 01/28/2023 1530   HEART Score 1 Filed at: 01/28/2023 1530                        SBIRT 22yo+    Flowsheet Row Most Recent Value   SBIRT (23 yo +)    In order to provide better care to our patients, we are screening all of our patients for alcohol and drug use  Would it be okay to ask you these screening questions? Yes Filed at: 01/28/2023 1419   Initial Alcohol Screen: US AUDIT-C     1  How often do you have a drink containing alcohol? 0 Filed at: 01/28/2023 1419   2  How many drinks containing alcohol do you have on a typical day you are drinking? 0 Filed at: 01/28/2023 1419   3b  FEMALE Any Age, or MALE 65+: How often do you have 4 or more drinks on one occassion? 0 Filed at: 01/28/2023 1419   Audit-C Score 0 Filed at: 01/28/2023 1419   SUE: How many times in the past year have you    Used an illegal drug or used a prescription medication for non-medical reasons? Never Filed at: 01/28/2023 1419                    Medical Decision Making  Patient is a 53-year-old female who presents for chest pain  Vitals normal   Physical exam with LLQ tenderness  Labs normal   Urine pregnancy negative  Urinary analysis negative  Chest x-ray negative for any acute process  Troponin negative  Discussed results with the patient and the low likelihood of ACS or life-threatening cause  History more aligned with acute gastritis or enteritis followed by diarrhea and possible vasovagal response  Decision with patient to forego CT abdomen scan  Scheduled ultrasound in 3 days with OB/GYN  If worsening abdominal pain, blood in stool, chest pain, shortness of breath or other new symptoms, patient should return to ER  Amount and/or Complexity of Data Reviewed  Labs: ordered  Risk  Prescription drug management            Disposition  Final diagnoses:   Atypical chest pain     Time reflects when diagnosis was documented in both MDM as applicable and the Disposition within this note     Time User Action Codes Description Comment    1/28/2023  1:47 PM Yissel Downey 41, [address] [R07 [id number]] Atypical chest pain       ED Disposition     ED Disposition   Discharge    Condition   Stable    Date/Time   Sat Jan 28, 2023 3:26 PM    Comment   Mariano Isaac discharge to home/self care  Follow-up Information     Follow up With Specialties Details Why Contact Info Additional Information    Faizan Enriquez MD Family Medicine Schedule an appointment as soon as possible for a visit in 1 week  [address] Emergency Department Emergency Medicine Go to  If symptoms worsen [address] Emergency Department, [address] [address], [address] [place] [address] MD [facility]   [address] [place] [address] [address] [place] [address]  [phone]             Patient's Medications   Discharge Prescriptions    No medications on file       No discharge procedures on file      PDMP Review     None          ED Provider  Electronically Signed by           Raegan Bee PA-C  01/28/23 9800

## 2023-01-29 LAB
ATRIAL RATE: 72 BPM
ATRIAL RATE: 82 BPM
P AXIS: 75 DEGREES
P AXIS: 79 DEGREES
PR INTERVAL: 156 MS
PR INTERVAL: 164 MS
QRS AXIS: 50 DEGREES
QRS AXIS: 55 DEGREES
QRSD INTERVAL: 74 MS
QRSD INTERVAL: 78 MS
QT INTERVAL: 382 MS
QT INTERVAL: 400 MS
QTC INTERVAL: 438 MS
QTC INTERVAL: 446 MS
T WAVE AXIS: 31 DEGREES
T WAVE AXIS: 41 DEGREES
VENTRICULAR RATE: 72 BPM
VENTRICULAR RATE: 82 BPM

## 2023-01-31 ENCOUNTER — HOSPITAL ENCOUNTER (OUTPATIENT)
Dept: ULTRASOUND IMAGING | Facility: HOSPITAL | Age: 33
Discharge: HOME | End: 2023-01-31
Attending: OBSTETRICS & GYNECOLOGY

## 2023-01-31 DIAGNOSIS — N92.6 IRREGULAR MENSES: ICD-10-CM

## 2023-02-08 ENCOUNTER — TELEPHONE (OUTPATIENT)
Dept: OBGYN CLINIC | Facility: CLINIC | Age: 33
End: 2023-02-08

## 2023-02-08 DIAGNOSIS — Z87.42 PERSONAL HISTORY OF OVARIAN CYST: Primary | ICD-10-CM

## 2023-02-08 NOTE — TELEPHONE ENCOUNTER
----- Message from Vinny Swanson MD sent at 2/8/2023  8:05 AM EST -----  Notify patient sono is normal; small hemorrhagic cyst   Repeat 4-6 weeks to ensure resolution

## 2023-02-20 ENCOUNTER — TELEPHONE (OUTPATIENT)
Dept: ENDOCRINOLOGY | Facility: CLINIC | Age: 33
End: 2023-02-20

## 2023-02-22 ENCOUNTER — OFFICE VISIT (OUTPATIENT)
Dept: ENDOCRINOLOGY | Facility: CLINIC | Age: 33
End: 2023-02-22

## 2023-02-22 ENCOUNTER — LAB (OUTPATIENT)
Dept: LAB | Facility: CLINIC | Age: 33
End: 2023-02-22

## 2023-02-22 VITALS
BODY MASS INDEX: 31.07 KG/M2 | DIASTOLIC BLOOD PRESSURE: 80 MMHG | SYSTOLIC BLOOD PRESSURE: 130 MMHG | HEART RATE: 73 BPM | HEIGHT: 68 IN | WEIGHT: 205 LBS

## 2023-02-22 DIAGNOSIS — E05.90 HYPERTHYROIDISM: ICD-10-CM

## 2023-02-22 DIAGNOSIS — E05.90 HYPERTHYROIDISM: Primary | ICD-10-CM

## 2023-02-22 LAB
T4 FREE SERPL-MCNC: 1.43 NG/DL (ref 0.76–1.46)
TSH SERPL DL<=0.05 MIU/L-ACNC: <0.007 UIU/ML (ref 0.45–4.5)

## 2023-02-22 RX ORDER — MELOXICAM 15 MG/1
15 TABLET ORAL DAILY
COMMUNITY

## 2023-02-22 NOTE — PROGRESS NOTES
Hali Bone 26 y o  female MRN: [id number]    Encounter: [id number]      Assessment/Plan     Assessment: This is a 26y o -year-old female with hyperthyroidism  Plan:  1  Hyperthyroidism-based on laboratory testing done in June 2022 it appears she may have hyperthyroidism  I do not have any recent thyroid testing  I ordered a TSH and free T4  Based on results, I will pursue additional testing if necessary  CC:   Hyperthyroidism    History of Present Illness     HPI:  26-year-old woman presents for consultation related to thyroid abnormality  In June 2022, she was noted to have hyperthyroidism with suppressed TSH and a high free T4  At that time, additional testing was not completed  She states that she has been trying to get into see an endocrinologist for a year but has had multiple surgeries leading to delay  She does admit to constipation, hair changes, skin changes and weight gain  She does admit to some fine tremor, palpitations but denies any temperature intolerance  There is no family history of thyroid disorder  Review of Systems   Constitutional: Negative for chills and fever  Respiratory: Negative for shortness of breath  Cardiovascular: Positive for palpitations  Negative for chest pain  Gastrointestinal: Negative for constipation, diarrhea, nausea and vomiting  Endocrine: Negative for cold intolerance and heat intolerance  Neurological: Positive for tremors  All other systems reviewed and are negative        Historical Information   Past Medical History:   Diagnosis Date   • Abnormal Pap smear of cervix    • Abnormal uterine bleeding (AUB) 8/14/2022   • Gonorrhea    • History of positive PPD    • Hypertension    • Hypothyroid    • Ulcerative colitis [facility])      Past Surgical History:   Procedure Laterality Date   • APPENDECTOMY     • DE HYSTEROSCOPY BX ENDOMETRIUM&/POLYPC W/WO D&C N/A 8/17/2022    Procedure: DILATATION AND CURETTAGE (D&C) WITH HYSTEROSCOPY;  Surgeon: Gaudencio Koch MD;  [facility];  Service: Gynecology   • REMOVAL OF INTRAUTERINE DEVICE (IUD) N/A 2022    Procedure: REMOVAL OF INTRAUTERINE DEVICE (IUD); Surgeon: Gaudencio Koch MD;  [facility];  Service: Gynecology     Social History   Social History     Substance and Sexual Activity   Alcohol Use Yes    Comment: rare     Social History     Substance and Sexual Activity   Drug Use Not Currently   • Types: Marijuana     Social History     Tobacco Use   Smoking Status Former   • Packs/day: 0 00   • Years: 5 00   • Pack years: 0 00   • Types: Cigars, Cigarettes   • Start date: 2015   • Quit date: 2022   • Years since quittin 5   Smokeless Tobacco Never     Family History:   Family History   Problem Relation Age of Onset   • Diabetes Mother    • Lupus Mother        Meds/Allergies   Current Outpatient Medications   Medication Sig Dispense Refill   • meloxicam (MOBIC) 15 mg tablet Take 15 mg by mouth daily     • metoprolol succinate (TOPROL-XL) 25 mg 24 hr tablet Take 25 mg by mouth daily     • acetaminophen (TYLENOL) 325 mg tablet Take 2 tablets (650 mg total) by mouth every 6 (six) hours 30 tablet 0     No current facility-administered medications for this visit  Allergies   Allergen Reactions   • Shellfish Allergy - Food Allergy Other (See Comments) and Rash     Itching and swelling of mouth & lips       Objective   Vitals: Blood pressure 130/80, pulse 73, height 5' 8" (1 727 m), weight 93 kg (205 lb), not currently breastfeeding  Physical Exam  Vitals reviewed  Constitutional:       General: She is not in acute distress  Appearance: She is well-developed  She is not diaphoretic  HENT:      Head: Normocephalic and atraumatic  Mouth/Throat:      Pharynx: No oropharyngeal exudate  Eyes:      General: Lids are normal  No scleral icterus  Right eye: No discharge  Left eye: No discharge        Conjunctiva/sclera: Conjunctivae normal    Neck:      Thyroid: No thyromegaly  Cardiovascular:      Rate and Rhythm: Normal rate and regular rhythm  Heart sounds: Normal heart sounds  No murmur heard  No friction rub  No gallop  Pulmonary:      Effort: Pulmonary effort is normal  No respiratory distress  Breath sounds: Normal breath sounds  No wheezing  Abdominal:      General: Bowel sounds are normal  There is no distension  Palpations: Abdomen is soft  Tenderness: There is no abdominal tenderness  Musculoskeletal:         General: No tenderness or deformity  Normal range of motion  Cervical back: Neck supple  Lymphadenopathy:      Head:      Right side of head: No occipital adenopathy  Left side of head: No occipital adenopathy  Upper Body:      Right upper body: No supraclavicular adenopathy  Left upper body: No supraclavicular adenopathy  Skin:     General: Skin is warm  Findings: No erythema or rash  Neurological:      Mental Status: She is alert and oriented to person, place, and time  Cranial Nerves: No cranial nerve deficit  Psychiatric:         Mood and Affect: Mood normal          Behavior: Behavior normal          The history was obtained from the review of the chart, patient  Lab Results:   Lab Results   Component Value Date/Time    TSH 3RD GENERATON <0 007 (L) 06/30/2022 09:04 AM    Free T4 1 89 (H) 06/30/2022 09:04 AM       Portions of the record may have been created with voice recognition software  Occasional wrong word or "sound a like" substitutions may have occurred due to the inherent limitations of voice recognition software  Read the chart carefully and recognize, using context, where substitutions have occurred

## 2023-02-23 ENCOUNTER — TELEPHONE (OUTPATIENT)
Dept: ENDOCRINOLOGY | Facility: CLINIC | Age: 33
End: 2023-02-23

## 2023-02-23 DIAGNOSIS — E05.90 HYPERTHYROIDISM: Primary | ICD-10-CM

## 2023-02-23 NOTE — RESULT ENCOUNTER NOTE
TSH remains suppressed and the free T4 is high normal   Check thyroid stimulating immunoglobulin, thyroid antibodies panel

## 2023-02-23 NOTE — TELEPHONE ENCOUNTER
----- Message from Yumiko Langston MD sent at 2/23/2023 10:15 AM EST -----  TSH remains suppressed and the free T4 is high normal   Check thyroid stimulating immunoglobulin, thyroid antibodies panel

## 2023-03-13 ENCOUNTER — APPOINTMENT (OUTPATIENT)
Dept: LAB | Facility: MEDICAL CENTER | Age: 33
End: 2023-03-13

## 2023-03-15 ENCOUNTER — TELEPHONE (OUTPATIENT)
Dept: ENDOCRINOLOGY | Facility: CLINIC | Age: 33
End: 2023-03-15

## 2023-03-15 NOTE — TELEPHONE ENCOUNTER
----- Message from Paola Connor MD sent at 3/15/2023 11:19 AM EDT -----  Thyroid antibodies are positive suggesting an autoimmune process  Await TSI

## 2023-03-22 ENCOUNTER — TELEPHONE (OUTPATIENT)
Dept: ENDOCRINOLOGY | Facility: CLINIC | Age: 33
End: 2023-03-22

## 2023-03-23 ENCOUNTER — TELEPHONE (OUTPATIENT)
Dept: ENDOCRINOLOGY | Facility: CLINIC | Age: 33
End: 2023-03-23

## 2023-03-23 DIAGNOSIS — E05.90 HYPERTHYROIDISM: Primary | ICD-10-CM

## 2023-03-23 NOTE — TELEPHONE ENCOUNTER
Patient called very upset said last person she talked she would not talk to again     She was yelling a me why does she have to get her blood work done again so soon just had it done in Feb told her that the blood work was not due until May she then asked why so long I told her it is usually every 3 mo for blood work to adjust medication if needed but that she needed to get her scan completed as soon as she could she continued to yell at me so I told her she did not need to yell I was just giving her the infomation

## 2023-04-06 ENCOUNTER — HOSPITAL ENCOUNTER (OUTPATIENT)
Dept: NUCLEAR MEDICINE | Facility: HOSPITAL | Age: 33
Discharge: HOME | End: 2023-04-06
Attending: INTERNAL MEDICINE

## 2023-04-06 DIAGNOSIS — E05.90 HYPERTHYROIDISM: ICD-10-CM

## 2023-04-07 ENCOUNTER — HOSPITAL ENCOUNTER (OUTPATIENT)
Dept: ULTRASOUND IMAGING | Facility: HOSPITAL | Age: 33
Discharge: HOME | End: 2023-04-07

## 2023-04-07 ENCOUNTER — HOSPITAL ENCOUNTER (OUTPATIENT)
Dept: NUCLEAR MEDICINE | Facility: HOSPITAL | Age: 33
Discharge: HOME | End: 2023-04-07
Attending: INTERNAL MEDICINE

## 2023-04-07 DIAGNOSIS — Z87.42 PERSONAL HISTORY OF OVARIAN CYST: ICD-10-CM

## 2023-04-07 NOTE — RESULT ENCOUNTER NOTE
The uptake and scan shows slightly increased uptake at 6 hours  Repeat laboratory testing that were ordered at the end of March in 2 weeks

## 2023-04-26 DIAGNOSIS — Z87.42 PERSONAL HISTORY OF OVARIAN CYST: Primary | ICD-10-CM

## 2023-04-27 ENCOUNTER — TELEPHONE (OUTPATIENT)
Dept: OBGYN CLINIC | Facility: CLINIC | Age: 33
End: 2023-04-27

## 2023-04-27 NOTE — TELEPHONE ENCOUNTER
----- Message from Simon Atkins, [address] sent at 4/26/2023  4:53 PM EDT -----  Please call this patient to notify her of her pelvic u/s results which were not viewed in MyChart  Thanks

## 2023-05-05 ENCOUNTER — APPOINTMENT (OUTPATIENT)
Dept: LAB | Facility: CLINIC | Age: 33
End: 2023-05-05

## 2023-05-05 DIAGNOSIS — E05.90 HYPERTHYROIDISM: ICD-10-CM

## 2023-05-05 DIAGNOSIS — Z13.0 SCREENING FOR IRON DEFICIENCY ANEMIA: ICD-10-CM

## 2023-05-05 DIAGNOSIS — E28.2 POLYCYSTIC OVARIES: ICD-10-CM

## 2023-05-05 DIAGNOSIS — Z11.4 SCREENING FOR HUMAN IMMUNODEFICIENCY VIRUS: ICD-10-CM

## 2023-05-05 DIAGNOSIS — N92.6 IRREGULAR MENSES: ICD-10-CM

## 2023-05-05 DIAGNOSIS — Z01.83 ENCOUNTER FOR BLOOD TYPING: ICD-10-CM

## 2023-05-05 DIAGNOSIS — Z11.3 SCREENING EXAMINATION FOR VENEREAL DISEASE: ICD-10-CM

## 2023-05-05 DIAGNOSIS — Z11.59 SPECIAL SCREENING EXAMINATION FOR VIRAL DISEASE: ICD-10-CM

## 2023-05-05 LAB
ABO GROUP BLD: NORMAL
BLD GP AB SCN SERPL QL: NEGATIVE
ESTRADIOL SERPL-MCNC: 73 PG/ML
FSH SERPL-ACNC: 5.3 MIU/ML
HBV SURFACE AG SER QL: NORMAL
HIV 1+2 AB+HIV1 P24 AG SERPL QL IA: NORMAL
HIV 2 AB SERPL QL IA: NORMAL
HIV1 AB SERPL QL IA: NORMAL
HIV1 P24 AG SERPL QL IA: NORMAL
PROGEST SERPL-MCNC: 0.8 NG/ML
PROLACTIN SERPL-MCNC: 8.1 NG/ML
RH BLD: POSITIVE
RUBV IGG SERPL IA-ACNC: 11.7 IU/ML
T3 SERPL-MCNC: 1.7 NG/ML (ref 0.6–1.8)
T4 FREE SERPL-MCNC: 1.3 NG/DL (ref 0.76–1.46)
T4 FREE SERPL-MCNC: 1.31 NG/DL (ref 0.76–1.46)
TREPONEMA PALLIDUM IGG+IGM AB [PRESENCE] IN SERUM OR PLASMA BY IMMUNOASSAY: NORMAL
TSH SERPL DL<=0.05 MIU/L-ACNC: <0.007 UIU/ML (ref 0.45–4.5)
VZV IGG SER QL IA: NORMAL

## 2023-05-07 LAB
DHEA-S SERPL-MCNC: 202 UG/DL (ref 57.3–279.2)
TESTOST FREE SERPL-MCNC: 3.6 PG/ML (ref 0–4.2)
TESTOST SERPL-MCNC: 37 NG/DL (ref 8–60)

## 2023-05-08 NOTE — RESULT ENCOUNTER NOTE
TSH remains suppressed but the T3 and T4 levels are normal   Discuss with Dr Chantel Marquis at the next visit

## 2023-05-09 LAB — 17OHP SERPL-MCNC: 59 NG/DL

## 2023-05-12 LAB — MIS SERPL-MCNC: 4.02 NG/ML

## 2023-05-15 ENCOUNTER — OFFICE VISIT (OUTPATIENT)
Dept: ENDOCRINOLOGY | Facility: CLINIC | Age: 33
End: 2023-05-15

## 2023-05-15 VITALS
BODY MASS INDEX: 31.22 KG/M2 | SYSTOLIC BLOOD PRESSURE: 140 MMHG | HEIGHT: 68 IN | WEIGHT: 206 LBS | DIASTOLIC BLOOD PRESSURE: 82 MMHG | HEART RATE: 92 BPM

## 2023-05-15 DIAGNOSIS — R53.83 OTHER FATIGUE: ICD-10-CM

## 2023-05-15 DIAGNOSIS — E05.00 GRAVES' DISEASE: ICD-10-CM

## 2023-05-15 DIAGNOSIS — E55.9 VITAMIN D DEFICIENCY: ICD-10-CM

## 2023-05-15 DIAGNOSIS — E05.90 HYPERTHYROIDISM: Primary | ICD-10-CM

## 2023-05-15 RX ORDER — METHIMAZOLE 5 MG/1
2.5 TABLET ORAL 3 TIMES DAILY
Qty: 45 TABLET | Refills: 3 | Status: SHIPPED | OUTPATIENT
Start: 2023-05-15

## 2023-05-15 RX ORDER — CELECOXIB 200 MG/1
CAPSULE ORAL
COMMUNITY
Start: 2023-05-10

## 2023-05-15 NOTE — PATIENT INSTRUCTIONS
Start Methimazole 2 5 mg orally daily  Repeat thyroid function test in 6 to 8 weeks, earlier if you have any concerning symptoms

## 2023-05-15 NOTE — PROGRESS NOTES
Holly Saunders 26 y o  female MRN: [id number]    Encounter: [id number]      Assessment/Plan     1  Subclinical hyperthyroidism secondary to Graves' disease  2  Palpitations, fatigue, shakes  Has multiple symptoms that could be associated with hyperthyroidism  Start methimazole 2 5 mg orally daily  Repeat TSH, free T4, T3 in 6 to 8 weeks  Ultrasound thyroid to rule out nodules    Discussed use of antithyroid medication in pregnancy  If patient were to conceive while on methimazole, patient should let us know as soon as she has a positive pregnancy test   Would switch to propylthiouracil for the first trimester of pregnancy  --Discussed possible treatments along with potential side effects, based on etiology: anithyroid drugs, WHEELER ablation, monitoring (if thyroiditis) and surgery (rarely indicated)  --Reviewed side effects of thionamide treatment which include rash, liver dysfunction and WBC suppression  3  history of vitamin D deficiency-check vitamin D level    I have spent a total time of 40 minutes on 05/15/23 in caring for this patient  CC: hyperthyroidism     History of Present Illness     HPI:  Holly Saunders is a 26 y o  female who is here for a follow-up of hyperthyroidism    Last seen in  2/2023 Dr Crystal Ashford   Per documentation, noted to have hypothyroidism on labs in 6/2022  Symptoms of palpitations, tremors      On repeat labs, TSH suppressed, free T4, T3 within normal limits, TSI mildly positive    Thyroid scan 4/2023-slightly elevated thyroid uptake at 6 hours    C/o fatigue   Gained 6 lbs since the last visit, since April 2022 had gained 35 lbs   Was on disability for some time due to knee surgery but recently has been active  C/o puffiness in the neck; c/o occasional difficulty in swallowing   Voice is deeper   Has insomnia X 1 year  and does not sleep well; taking melatonin which helps    Has shakes/ tremors/ palpitations   Feels cold   Brittle nails and hair   Has ulcerative colitis and gets constipation   Headaches occasional dizziness   H/o irregular menstrual cycles; had a D&C in August, regular cycles 2022, April and May have been 1 week earlier  Is actively trying to conceive     FH: Mother with lupus, no known thyroid disease  GM and great grandmother with h/o lupus    Vitamin D deficiency  - not on any supplements        All other systems were reviewed and are negative  Review of Systems    Historical Information   Past Medical History:   Diagnosis Date   • Abnormal Pap smear of cervix    • Abnormal uterine bleeding (AUB) 2022   • Gonorrhea    • History of positive PPD    • Hypertension    • Hypothyroid    • Ulcerative colitis ([facility] [address] )      Past Surgical History:   Procedure Laterality Date   • APPENDECTOMY     • [place] HYSTEROSCOPY BX ENDOMETRIUM&/POLYPC W/WO D&C N/A 2022    Procedure: DILATATION AND CURETTAGE (D&C) WITH HYSTEROSCOPY;  Surgeon: Valera Riedel, MD;  [facility];  Service: Gynecology   • REMOVAL OF INTRAUTERINE DEVICE (IUD) N/A 2022    Procedure: REMOVAL OF INTRAUTERINE DEVICE (IUD);   Surgeon: Valera Riedel, MD;  [facility];  Service: Gynecology     Social History   Social History     Substance and Sexual Activity   Alcohol Use Yes    Comment: rare     Social History     Substance and Sexual Activity   Drug Use Not Currently   • Types: Marijuana     Social History     Tobacco Use   Smoking Status Former   • Packs/day: 0 00   • Years: 5 00   • Pack years: 0 00   • Types: Cigars, Cigarettes   • Start date: 2015   • Quit date: 2022   • Years since quittin 7   Smokeless Tobacco Never     Family History:   Family History   Problem Relation Age of Onset   • Diabetes Mother    • Lupus Mother        Meds/Allergies   Current Outpatient Medications   Medication Sig Dispense Refill   • celecoxib (CeleBREX) 200 mg capsule      • acetaminophen (TYLENOL) 325 mg tablet Take 2 tablets (650 mg total) by mouth every 6 (six) "hours 30 tablet 0   • meloxicam (MOBIC) 15 mg tablet Take 15 mg by mouth daily     • metoprolol succinate (TOPROL-XL) 25 mg 24 hr tablet Take 25 mg by mouth daily (Patient not taking: Reported on 5/15/2023)       No current facility-administered medications for this visit  Allergies   Allergen Reactions   • Shellfish Allergy - Food Allergy Other (See Comments) and Rash     Itching and swelling of mouth & lips       Objective   Vitals: Blood pressure 140/82, pulse 92, height 5' 8\" (1 727 m), weight 93 4 kg (206 lb), not currently breastfeeding  Physical Exam  Constitutional:       Appearance: She is well-developed  HENT:      Head: Normocephalic and atraumatic  Eyes:      Conjunctiva/sclera: Conjunctivae normal       Pupils: Pupils are equal, round, and reactive to light  Neck:      Thyroid: No thyromegaly  Comments: Symmetrical thyromegaly, nontender, no nodules appreciated on palpation  Cardiovascular:      Rate and Rhythm: Normal rate and regular rhythm  Heart sounds: Normal heart sounds  No murmur heard  Pulmonary:      Effort: Pulmonary effort is normal       Breath sounds: Normal breath sounds  No wheezing  Abdominal:      General: There is no distension  Palpations: Abdomen is soft  Tenderness: There is no abdominal tenderness  Musculoskeletal:         General: Normal range of motion  Cervical back: Normal range of motion and neck supple  Skin:     General: Skin is warm and dry  Neurological:      Mental Status: She is alert and oriented to person, place, and time  Comments: Brisk DTRs  No tremors     Psychiatric:         Behavior: Behavior normal          The history was obtained from the review of the chart, patient      Lab Results:   Lab Results   Component Value Date/Time    TSH 3RD GENERATON <0 007 (L) 05/05/2023 11:28 AM    TSH 3RD GENERATON <0 007 (L) 02/22/2023 11:08 AM    TSH 3RD GENERATON <0 007 (L) 06/30/2022 09:04 AM    Free T4 1 30 05/05/2023 " "11:28 AM    Free T4 1 31 05/05/2023 11:28 AM    Free T4 1 43 02/22/2023 11:08 AM     Lab Results   Component Value Date    WBC 5 88 01/28/2023    HGB 12 9 01/28/2023    HCT 39 3 01/28/2023    MCV 91 01/28/2023     01/28/2023     Lab Results   Component Value Date    CREATININE 0 62 01/28/2023    BUN 9 01/28/2023    K 3 6 01/28/2023     01/28/2023    CO2 25 01/28/2023     No results found for: HGBA1C      Imaging Studies:       I have personally reviewed pertinent reports  Portions of the record may have been created with voice recognition software  Occasional wrong word or \"sound a like\" substitutions may have occurred due to the inherent limitations of voice recognition software  Read the chart carefully and recognize, using context, where substitutions have occurred      "

## 2023-06-07 ENCOUNTER — TELEPHONE (OUTPATIENT)
Dept: GASTROENTEROLOGY | Facility: CLINIC | Age: 33
End: 2023-06-07

## 2023-06-07 NOTE — TELEPHONE ENCOUNTER
Patients GI provider:  Dr Prince Galvan    Number to return call: [phone]    Reason for call: Pt calling with symptoms of diarrhea for 3 days, constant urge to go, feeling lethargic  No appointments available  Patient would like to speak with someone regarding her symptoms     Please contact patient to further assist

## 2023-08-30 ENCOUNTER — OFFICE VISIT (OUTPATIENT)
Dept: URGENT CARE | Age: 33
End: 2023-08-30
Payer: COMMERCIAL

## 2023-08-30 VITALS
OXYGEN SATURATION: 97 % | DIASTOLIC BLOOD PRESSURE: 102 MMHG | RESPIRATION RATE: 18 BRPM | HEART RATE: 76 BPM | SYSTOLIC BLOOD PRESSURE: 150 MMHG

## 2023-08-30 DIAGNOSIS — J32.9 BACTERIAL SINUSITIS: ICD-10-CM

## 2023-08-30 DIAGNOSIS — B96.89 BACTERIAL SINUSITIS: ICD-10-CM

## 2023-08-30 DIAGNOSIS — R05.1 ACUTE COUGH: Primary | ICD-10-CM

## 2023-08-30 LAB
SARS-COV-2 AG UPPER RESP QL IA: NEGATIVE
VALID CONTROL: NORMAL

## 2023-08-30 PROCEDURE — G0382 LEV 3 HOSP TYPE B ED VISIT: HCPCS | Performed by: NURSE PRACTITIONER

## 2023-08-30 PROCEDURE — 87811 SARS-COV-2 COVID19 W/OPTIC: CPT | Performed by: NURSE PRACTITIONER

## 2023-08-30 RX ORDER — AMOXICILLIN AND CLAVULANATE POTASSIUM 875; 125 MG/1; MG/1
1 TABLET, FILM COATED ORAL EVERY 12 HOURS SCHEDULED
Qty: 14 TABLET | Refills: 0 | Status: SHIPPED | OUTPATIENT
Start: 2023-08-30 | End: 2023-09-06

## 2023-08-30 NOTE — PROGRESS NOTES
NAME: Troy Parker is a 26 y.o. female  : 1990    MRN: [id number]    BP (!) 150/102   Pulse 76   Resp 18   SpO2 97%     11:34 AM    Assessment and Plan   Acute cough [R05.1]  1. Acute cough  Poct Covid 19 Rapid Antigen Test    amoxicillin-clavulanate (AUGMENTIN) 875-125 mg per tablet      2. Bacterial sinusitis  amoxicillin-clavulanate (AUGMENTIN) 875-125 mg per tablet          [facility]. Diagnoses and all orders for this visit:    Acute cough  -     Poct Covid 19 Rapid Antigen Test  -     amoxicillin-clavulanate (AUGMENTIN) 875-125 mg per tablet; Take 1 tablet by mouth every 12 (twelve) hours for 7 days    Bacterial sinusitis  -     amoxicillin-clavulanate (AUGMENTIN) 875-125 mg per tablet; Take 1 tablet by mouth every 12 (twelve) hours for 7 days        Patient Instructions   Patient Instructions   Take zyrtec or allegra daily  Use flonase 1-2 sprays in each nare daily   Use nasal saline to the nose,   Use humidifer in room  Symptoms worsen go to ER  Rest      Take antibiotic as directed   If symptoms worsen go to the ER, follow up with PCP       Proceed to the nearest ER if symptoms worsen, Follow up with your PCP  Continue to social distance, wash your hands, and wear your masks. Please continue to follow the CDC. gov guidelines daily for they are subject to change on COVID-19    Chief Complaint     Chief Complaint   Patient presents with   • Cough     Cough, diarrhea, congestion, fatigue, "itchy throat" SOB, chest heaviness. Sx for 2 days. Nothing OTC. History of Present Illness     Patient is a 75-year-old female who is here today from sinus congestion that has been present for the past week. The past 2 days it has gotten more painful in her sinuses and her face referred right ear discomfort postnasal drip and a sore throat. She has been coughing and having a headache.   Patient currently does not take anything for her symptoms over-the-counter however she does take blood pressure medicine for she is aware her blood pressure is elevated today at 150/102. Patient has not taken that medicine today. She takes in the evening. Patient has no nausea vomiting or diarrhea rapid COVID test was negative today in the office. Review of Systems   Review of Systems   Constitutional: Positive for fever. Negative for activity change, appetite change, chills and fatigue. HENT: Positive for congestion, ear pain, postnasal drip, rhinorrhea, sinus pressure, sinus pain, sneezing and sore throat. Negative for ear discharge. Eyes: Negative for pain. Respiratory: Positive for cough. Negative for chest tightness, shortness of breath and wheezing. Cardiovascular: Negative. Gastrointestinal: Negative for nausea. Genitourinary: Negative. Musculoskeletal: Negative. Negative for neck pain. Skin: Negative. Allergic/Immunologic: Environmental allergies: Sinus. Neurological: Positive for headaches. Psychiatric/Behavioral: Negative.         Current Medications       Current Outpatient Medications:   •  amoxicillin-clavulanate (AUGMENTIN) 875-125 mg per tablet, Take 1 tablet by mouth every 12 (twelve) hours for 7 days, Disp: 14 tablet, Rfl: 0  •  acetaminophen (TYLENOL) 325 mg tablet, Take 2 tablets (650 mg total) by mouth every 6 (six) hours, Disp: 30 tablet, Rfl: 0  •  celecoxib (CeleBREX) 200 mg capsule, , Disp: , Rfl:   •  meloxicam (MOBIC) 15 mg tablet, Take 15 mg by mouth daily, Disp: , Rfl:   •  methimazole (TAPAZOLE) 5 mg tablet, Take 0.5 tablets (2.5 mg total) by mouth 3 (three) times a day (Patient not taking: Reported on 8/30/2023), Disp: 45 tablet, Rfl: 3  •  metoprolol succinate (TOPROL-XL) 25 mg 24 hr tablet, Take 25 mg by mouth daily (Patient not taking: Reported on 5/15/2023), Disp: , Rfl:     Current Allergies     Allergies as of 08/30/2023 - Reviewed 08/30/2023   Allergen Reaction Noted   • Shellfish allergy - food allergy Other (See Comments) and Rash 07/15/2013              Past Medical History:   Diagnosis Date   • Abnormal Pap smear of cervix    • Abnormal uterine bleeding (AUB) 8/14/2022   • Gonorrhea    • History of positive PPD    • Hypertension    • Hypothyroid    • Ulcerative colitis ([address])        Past Surgical History:   Procedure Laterality Date   • APPENDECTOMY     • [place] HYSTEROSCOPY BX ENDOMETRIUM&/POLYPC W/WO D&C N/A 8/17/2022    Procedure: DILATATION AND CURETTAGE (D&C) WITH HYSTEROSCOPY;  Surgeon: Julia Cleveland MD;  [facility];  Service: Gynecology   • REMOVAL OF INTRAUTERINE DEVICE (IUD) N/A 8/17/2022    Procedure: REMOVAL OF INTRAUTERINE DEVICE (IUD); Surgeon: Julia Cleveland MD;  [facility];  Service: Gynecology       Family History   Problem Relation Age of Onset   • Diabetes Mother    • Lupus Mother          Medications have been verified. The following portions of the patient's history were reviewed and updated as appropriate: allergies, current medications, past family history, past medical history, past social history, past surgical history and problem list.    Objective   BP (!) 150/102   Pulse 76   Resp 18   SpO2 97%      Physical Exam     Physical Exam  Constitutional:       General: She is awake. Appearance: She is well-developed. She is not ill-appearing. HENT:      Head: Normocephalic. Right Ear: Hearing, tympanic membrane, ear canal and external ear normal.      Left Ear: Hearing, tympanic membrane, ear canal and external ear normal.      Nose: Mucosal edema, congestion and rhinorrhea present. Right Turbinates: Enlarged and swollen. Left Turbinates: Enlarged and swollen. Right Sinus: Maxillary sinus tenderness present. Left Sinus: Maxillary sinus tenderness present. Comments:  Thick green mucus noted in the left nare turbinates are red and swollen  Right turbinates are red and swollen clear mucus noted in the right nare     Mouth/Throat:      Lips: Pink.      Mouth: Mucous membranes are moist.      Pharynx: Oropharynx is clear. Uvula midline. Posterior oropharyngeal erythema present. No pharyngeal swelling, oropharyngeal exudate or uvula swelling. Eyes:      Extraocular Movements: Extraocular movements intact. Pupils: Pupils are equal, round, and reactive to light. Cardiovascular:      Rate and Rhythm: Normal rate and regular rhythm. Heart sounds: Normal heart sounds. Pulmonary:      Effort: Pulmonary effort is normal.      Breath sounds: Normal breath sounds and air entry. No decreased breath sounds, wheezing, rhonchi or rales. Neurological:      Mental Status: She is alert and oriented to person, place, and time. Psychiatric:         Behavior: Behavior is cooperative. Note: Portions of this record may have been created with voice recognition software. Occasional wrong word or "sound a like" substitutions may have occurred due to the inherent limitations of voice recognition software. Please read the chart carefully and recognize, using context, where substitutions have occurred. LUDIVINA Colon

## 2023-08-30 NOTE — LETTER
August 30, 2023     Patient: Chivo Johns  YOB: 1990  Date of Visit: 8/30/2023      To Whom it May Concern:    Chivo Johns is under my professional care. Thao Stern was seen in my office on 8/30/2023. Please excuse patient from work today and tomorrow.           Sincerely,          LUDIVINA Kilpatrick        CC: No Recipients

## 2023-08-30 NOTE — PATIENT INSTRUCTIONS
Take zyrtec or allegra daily  Use flonase 1-2 sprays in each nare daily   Use nasal saline to the nose,   Use humidifer in room  Symptoms worsen go to ER  Rest      Take antibiotic as directed   If symptoms worsen go to the ER, follow up with PCP

## 2023-11-14 ENCOUNTER — APPOINTMENT (EMERGENCY)
Dept: RADIOLOGY | Facility: HOSPITAL | Age: 33
End: 2023-11-14
Payer: COMMERCIAL

## 2023-11-14 ENCOUNTER — HOSPITAL ENCOUNTER (EMERGENCY)
Facility: HOSPITAL | Age: 33
Discharge: HOME | End: 2023-11-14
Attending: EMERGENCY MEDICINE
Payer: COMMERCIAL

## 2023-11-14 VITALS
SYSTOLIC BLOOD PRESSURE: 164 MMHG | OXYGEN SATURATION: 98 % | RESPIRATION RATE: 18 BRPM | HEART RATE: 68 BPM | TEMPERATURE: 98.6 F | DIASTOLIC BLOOD PRESSURE: 84 MMHG

## 2023-11-14 DIAGNOSIS — K51.90 ULCERATIVE COLITIS (HCC): ICD-10-CM

## 2023-11-14 DIAGNOSIS — R19.7 DIARRHEA: Primary | ICD-10-CM

## 2023-11-14 DIAGNOSIS — R10.9 ABDOMINAL PAIN: ICD-10-CM

## 2023-11-14 LAB
ALBUMIN SERPL BCP-MCNC: 4.3 G/DL (ref 3.5–5)
ALP SERPL-CCNC: 78 U/L (ref 34–104)
ALT SERPL W P-5'-P-CCNC: 11 U/L (ref 7–52)
ANION GAP SERPL CALCULATED.3IONS-SCNC: 4 MMOL/L
AST SERPL W P-5'-P-CCNC: 17 U/L (ref 13–39)
ATRIAL RATE: 80 BPM
BASOPHILS # BLD AUTO: 0.06 THOUSANDS/ÂΜL (ref 0–0.1)
BASOPHILS NFR BLD AUTO: 1 % (ref 0–1)
BILIRUB SERPL-MCNC: 0.28 MG/DL (ref 0.2–1)
BUN SERPL-MCNC: 12 MG/DL (ref 5–25)
CALCIUM SERPL-MCNC: 9.2 MG/DL (ref 8.4–10.2)
CHLORIDE SERPL-SCNC: 106 MMOL/L (ref 96–108)
CO2 SERPL-SCNC: 26 MMOL/L (ref 21–32)
CREAT SERPL-MCNC: 0.74 MG/DL (ref 0.6–1.3)
EOSINOPHIL # BLD AUTO: 0.34 THOUSAND/ÂΜL (ref 0–0.61)
EOSINOPHIL NFR BLD AUTO: 4 % (ref 0–6)
ERYTHROCYTE [DISTWIDTH] IN BLOOD BY AUTOMATED COUNT: 13.6 % (ref 11.6–15.1)
EXT PREGNANCY TEST URINE: NEGATIVE
EXT. CONTROL: NORMAL
GFR SERPL CREATININE-BSD FRML MDRD: 105 ML/MIN/1.73SQ M
GLUCOSE SERPL-MCNC: 91 MG/DL (ref 65–140)
HCT VFR BLD AUTO: 41.2 % (ref 34.8–46.1)
HGB BLD-MCNC: 13.4 G/DL (ref 11.5–15.4)
IMM GRANULOCYTES # BLD AUTO: 0.02 THOUSAND/UL (ref 0–0.2)
IMM GRANULOCYTES NFR BLD AUTO: 0 % (ref 0–2)
LIPASE SERPL-CCNC: 13 U/L (ref 11–82)
LYMPHOCYTES # BLD AUTO: 2.1 THOUSANDS/ÂΜL (ref 0.6–4.47)
LYMPHOCYTES NFR BLD AUTO: 26 % (ref 14–44)
MCH RBC QN AUTO: 30.2 PG (ref 26.8–34.3)
MCHC RBC AUTO-ENTMCNC: 32.5 G/DL (ref 31.4–37.4)
MCV RBC AUTO: 93 FL (ref 82–98)
MONOCYTES # BLD AUTO: 0.65 THOUSAND/ÂΜL (ref 0.17–1.22)
MONOCYTES NFR BLD AUTO: 8 % (ref 4–12)
NEUTROPHILS # BLD AUTO: 5.01 THOUSANDS/ÂΜL (ref 1.85–7.62)
NEUTS SEG NFR BLD AUTO: 61 % (ref 43–75)
NRBC BLD AUTO-RTO: 0 /100 WBCS
P AXIS: 56 DEGREES
PLATELET # BLD AUTO: 275 THOUSANDS/UL (ref 149–390)
PMV BLD AUTO: 10.7 FL (ref 8.9–12.7)
POTASSIUM SERPL-SCNC: 4 MMOL/L (ref 3.5–5.3)
PR INTERVAL: 162 MS
PROT SERPL-MCNC: 7.6 G/DL (ref 6.4–8.4)
QRS AXIS: 43 DEGREES
QRSD INTERVAL: 72 MS
QT INTERVAL: 388 MS
QTC INTERVAL: 447 MS
RBC # BLD AUTO: 4.44 MILLION/UL (ref 3.81–5.12)
SODIUM SERPL-SCNC: 136 MMOL/L (ref 135–147)
T WAVE AXIS: 35 DEGREES
VENTRICULAR RATE: 80 BPM
WBC # BLD AUTO: 8.18 THOUSAND/UL (ref 4.31–10.16)

## 2023-11-14 PROCEDURE — 99284 EMERGENCY DEPT VISIT MOD MDM: CPT

## 2023-11-14 PROCEDURE — 36415 COLL VENOUS BLD VENIPUNCTURE: CPT

## 2023-11-14 PROCEDURE — 96366 THER/PROPH/DIAG IV INF ADDON: CPT

## 2023-11-14 PROCEDURE — 81025 URINE PREGNANCY TEST: CPT

## 2023-11-14 PROCEDURE — 83690 ASSAY OF LIPASE: CPT | Performed by: EMERGENCY MEDICINE

## 2023-11-14 PROCEDURE — G1004 CDSM NDSC: HCPCS

## 2023-11-14 PROCEDURE — 93005 ELECTROCARDIOGRAM TRACING: CPT

## 2023-11-14 PROCEDURE — 99285 EMERGENCY DEPT VISIT HI MDM: CPT | Performed by: EMERGENCY MEDICINE

## 2023-11-14 PROCEDURE — 93010 ELECTROCARDIOGRAM REPORT: CPT | Performed by: INTERNAL MEDICINE

## 2023-11-14 PROCEDURE — 85025 COMPLETE CBC W/AUTO DIFF WBC: CPT | Performed by: EMERGENCY MEDICINE

## 2023-11-14 PROCEDURE — 96375 TX/PRO/DX INJ NEW DRUG ADDON: CPT

## 2023-11-14 PROCEDURE — 80053 COMPREHEN METABOLIC PANEL: CPT | Performed by: EMERGENCY MEDICINE

## 2023-11-14 PROCEDURE — 96365 THER/PROPH/DIAG IV INF INIT: CPT

## 2023-11-14 PROCEDURE — 74177 CT ABD & PELVIS W/CONTRAST: CPT

## 2023-11-14 RX ORDER — KETOROLAC TROMETHAMINE 30 MG/ML
15 INJECTION, SOLUTION INTRAMUSCULAR; INTRAVENOUS ONCE
Status: COMPLETED | OUTPATIENT
Start: 2023-11-14 | End: 2023-11-14

## 2023-11-14 RX ORDER — PREDNISONE 20 MG/1
40 TABLET ORAL ONCE
Status: COMPLETED | OUTPATIENT
Start: 2023-11-14 | End: 2023-11-14

## 2023-11-14 RX ORDER — ONDANSETRON 2 MG/ML
4 INJECTION INTRAMUSCULAR; INTRAVENOUS ONCE
Status: COMPLETED | OUTPATIENT
Start: 2023-11-14 | End: 2023-11-14

## 2023-11-14 RX ORDER — PREDNISONE 20 MG/1
40 TABLET ORAL DAILY
Qty: 10 TABLET | Refills: 0 | Status: SHIPPED | OUTPATIENT
Start: 2023-11-14 | End: 2023-11-19

## 2023-11-14 RX ADMIN — SODIUM CHLORIDE, SODIUM LACTATE, POTASSIUM CHLORIDE, AND CALCIUM CHLORIDE 1000 ML: .6; .31; .03; .02 INJECTION, SOLUTION INTRAVENOUS at 14:10

## 2023-11-14 RX ADMIN — IOHEXOL 100 ML: 350 INJECTION, SOLUTION INTRAVENOUS at 15:10

## 2023-11-14 RX ADMIN — KETOROLAC TROMETHAMINE 15 MG: 30 INJECTION, SOLUTION INTRAMUSCULAR; INTRAVENOUS at 14:10

## 2023-11-14 RX ADMIN — PREDNISONE 40 MG: 20 TABLET ORAL at 16:03

## 2023-11-14 RX ADMIN — ONDANSETRON 4 MG: 2 INJECTION INTRAMUSCULAR; INTRAVENOUS at 14:10

## 2023-11-14 NOTE — ED PROVIDER NOTES
History  Chief Complaint   Patient presents with    Abdominal Pain     Pt came in complaining of Ulcerative colitis flair up. Per pt she is having diarrhea, blood in stool and abdominal pain since last night. 40-year-old female with a history of ulcerative colitis, hypertension, hypothyroidism who presents complaining of left-sided abdominal pain that began last night and diarrhea that began this morning. The patient states that her symptoms feel similar to prior ulcerative colitis flares. The patient states that it has been approximately 2 years since her last flare. The patient reports that she had a small amount of blood in her stool with a bowel movement around 11 AM.  The patient states that her last colonoscopy 2 years ago showed endoscopic remission and she has not been on any medications for her ulcerative colitis. The patient states that she has had some intermittent nausea but no vomiting. Patient denies fever, chills, URI symptoms, chest pain, shortness of breath, dysuria, vaginal bleeding, vaginal discharge. Prior to Admission Medications   Prescriptions Last Dose Informant Patient Reported?  Taking?   acetaminophen (TYLENOL) 325 mg tablet  Self No No   Sig: Take 2 tablets (650 mg total) by mouth every 6 (six) hours   celecoxib (CeleBREX) 200 mg capsule   Yes No   Patient not taking: Reported on 8/30/2023   meloxicam (MOBIC) 15 mg tablet   Yes No   Sig: Take 15 mg by mouth daily   methimazole (TAPAZOLE) 5 mg tablet   No No   Sig: Take 0.5 tablets (2.5 mg total) by mouth 3 (three) times a day   Patient not taking: Reported on 8/30/2023   metoprolol succinate (TOPROL-XL) 25 mg 24 hr tablet  Self Yes No   Sig: Take 25 mg by mouth daily   Patient not taking: Reported on 5/15/2023      Facility-Administered Medications: None       Past Medical History:   Diagnosis Date    Abnormal Pap smear of cervix     Abnormal uterine bleeding (AUB) 8/14/2022    Gonorrhea     History of positive PPD Hypertension     Hypothyroid     Ulcerative colitis ([address] [facility])        Past Surgical History:   Procedure Laterality Date    APPENDECTOMY      MN HYSTEROSCOPY BX ENDOMETRIUM&/POLYPC W/WO D&C N/A 2022    Procedure: DILATATION AND CURETTAGE (D&C) WITH HYSTEROSCOPY;  Surgeon: Hilton Araujo MD;  [facility];  Service: Gynecology    REMOVAL OF INTRAUTERINE DEVICE (IUD) N/A 2022    Procedure: REMOVAL OF INTRAUTERINE DEVICE (IUD); Surgeon: Hilton Araujo MD;  [facility];  Service: Gynecology       Family History   Problem Relation Age of Onset    Diabetes Mother     Lupus Mother      I have reviewed and agree with the history as documented. E-Cigarette/Vaping    E-Cigarette Use Never User      E-Cigarette/Vaping Substances     Social History     Tobacco Use    Smoking status: Former     Packs/day: 0.00     Years: 5.00     Total pack years: 0.00     Types: Cigars, Cigarettes     Start date: 2015     Quit date: 2022     Years since quittin.2    Smokeless tobacco: Never   Vaping Use    Vaping Use: Never used   Substance Use Topics    Alcohol use: Yes     Comment: rare    Drug use: Not Currently     Types: Marijuana        Review of Systems   Constitutional:  Negative for chills, diaphoresis and fever. HENT:  Negative for congestion and sore throat. Eyes:  Negative for pain and redness. Respiratory:  Negative for cough and shortness of breath. Cardiovascular:  Negative for chest pain, palpitations and leg swelling. Gastrointestinal:  Positive for abdominal pain, blood in stool, diarrhea and nausea. Negative for vomiting. Genitourinary:  Negative for dysuria, flank pain and hematuria. Musculoskeletal:  Negative for arthralgias and myalgias. Skin:  Negative for color change, pallor and rash. Neurological:  Negative for dizziness, syncope, weakness, light-headedness, numbness and headaches. All other systems reviewed and are negative.       Physical Exam  ED Triage Vitals   Temperature Pulse Respirations Blood Pressure SpO2   11/14/23 1141 11/14/23 1141 11/14/23 1141 11/14/23 1141 11/14/23 1143   98.6 °F (37 °C) 85 18 (!) 154/108 98 %      Temp Source Heart Rate Source Patient Position - Orthostatic VS BP Location FiO2 (%)   11/14/23 1141 11/14/23 1141 11/14/23 1500 11/14/23 1500 --   Temporal Monitor Lying Left arm       Pain Score       11/14/23 1501       2             Orthostatic Vital Signs  Vitals:    11/14/23 1141 11/14/23 1500   BP: (!) 154/108 164/84   Pulse: 85 68   Patient Position - Orthostatic VS:  Lying       Physical Exam  Vitals and nursing note reviewed. Constitutional:       General: She is not in acute distress. Appearance: Normal appearance. She is not ill-appearing, toxic-appearing or diaphoretic. HENT:      Head: Normocephalic and atraumatic. Nose: Nose normal. No congestion or rhinorrhea. Mouth/Throat:      Mouth: Mucous membranes are moist.      Pharynx: Oropharynx is clear. Eyes:      General: No scleral icterus. Extraocular Movements: Extraocular movements intact. Conjunctiva/sclera: Conjunctivae normal.      Pupils: Pupils are equal, round, and reactive to light. Cardiovascular:      Rate and Rhythm: Normal rate and regular rhythm. Pulses: Normal pulses. Heart sounds: Normal heart sounds. No murmur heard. No friction rub. No gallop. Pulmonary:      Effort: Pulmonary effort is normal.      Breath sounds: Normal breath sounds. No wheezing, rhonchi or rales. Abdominal:      General: Abdomen is flat. Palpations: Abdomen is soft. Tenderness: There is abdominal tenderness in the left upper quadrant and left lower quadrant. There is no right CVA tenderness, left CVA tenderness, guarding or rebound. Musculoskeletal:         General: No swelling, tenderness, deformity or signs of injury. Normal range of motion. Cervical back: Normal range of motion and neck supple.  No rigidity or tenderness. Right lower leg: No edema. Left lower leg: No edema. Lymphadenopathy:      Cervical: No cervical adenopathy. Skin:     General: Skin is warm and dry. Capillary Refill: Capillary refill takes less than 2 seconds. Coloration: Skin is not jaundiced or pale. Findings: No bruising, erythema, lesion or rash. Neurological:      General: No focal deficit present. Mental Status: She is alert and oriented to person, place, and time.          ED Medications  Medications   lactated ringers bolus 1,000 mL (0 mL Intravenous Stopped 11/14/23 1715)   ketorolac (TORADOL) injection 15 mg (15 mg Intravenous Given 11/14/23 1410)   ondansetron (ZOFRAN) injection 4 mg (4 mg Intravenous Given 11/14/23 1410)   iohexol (OMNIPAQUE) 350 MG/ML injection (MULTI-DOSE) 100 mL (100 mL Intravenous Given 11/14/23 1510)   predniSONE tablet 40 mg (40 mg Oral Given 11/14/23 1603)       Diagnostic Studies  Results Reviewed       Procedure Component Value Units Date/Time    POCT pregnancy, urine [[id number]]  (Normal) Resulted: 11/14/23 1432    Lab Status: Final result Updated: 11/14/23 1432     EXT Preg Test, Ur Negative     Control Valid    Comprehensive metabolic panel [[id number]] Collected: 11/14/23 1210    Lab Status: Final result Specimen: Blood from Arm, Left Updated: 11/14/23 1246     Sodium 136 mmol/L      Potassium 4.0 mmol/L      Chloride 106 mmol/L      CO2 26 mmol/L      ANION GAP 4 mmol/L      BUN 12 mg/dL      Creatinine 0.74 mg/dL      Glucose 91 mg/dL      Calcium 9.2 mg/dL      AST 17 U/L      ALT 11 U/L      Alkaline Phosphatase 78 U/L      Total Protein 7.6 g/dL      Albumin 4.3 g/dL      Total Bilirubin 0.28 mg/dL      eGFR 105 ml/min/1.73sq m     Narrative:      [place] guidelines for Chronic Kidney Disease (CKD):     Stage 1 with normal or high GFR (GFR > 90 mL/min/1.73 square meters)    Stage 2 Mild CKD (GFR = 60-89 mL/min/1.73 square meters)    Stage 3A Moderate CKD (GFR = 45-59 mL/min/1.73 square meters)    Stage 3B Moderate CKD (GFR = 30-44 mL/min/1.73 square meters)    Stage 4 Severe CKD (GFR = 15-29 mL/min/1.73 square meters)    Stage 5 End Stage CKD (GFR <15 mL/min/1.73 square meters)  Note: GFR calculation is accurate only with a steady state creatinine    Lipase [[id number]]  (Normal) Collected: 11/14/23 1210    Lab Status: Final result Specimen: Blood from Arm, Left Updated: 11/14/23 1246     Lipase 13 u/L     CBC and differential [[id number]] Collected: 11/14/23 1210    Lab Status: Final result Specimen: Blood from Arm, Left Updated: 11/14/23 1226     WBC 8.18 Thousand/uL      RBC 4.44 Million/uL      Hemoglobin 13.4 g/dL      Hematocrit 41.2 %      MCV 93 fL      MCH 30.2 pg      MCHC 32.5 g/dL      RDW 13.6 %      MPV 10.7 fL      Platelets 030 Thousands/uL      nRBC 0 /100 WBCs      Neutrophils Relative 61 %      Immat GRANS % 0 %      Lymphocytes Relative 26 %      Monocytes Relative 8 %      Eosinophils Relative 4 %      Basophils Relative 1 %      Neutrophils Absolute 5.01 Thousands/µL      Immature Grans Absolute 0.02 Thousand/uL      Lymphocytes Absolute 2.10 Thousands/µL      Monocytes Absolute 0.65 Thousand/µL      Eosinophils Absolute 0.34 Thousand/µL      Basophils Absolute 0.06 Thousands/µL                    CT abdomen pelvis with contrast   Final Result by Asif Keane MD (11/14 1532)      No acute findings in the abdomen or pelvis. Resident: Art Araya, the attending radiologist, have reviewed the images and agree with the final report above. Workstation performed: [id number]               Procedures  Procedures      ED Course                                       Medical Decision Making  66-year-old female with a history of ulcerative colitis, hypertension, hypothyroidism who presents complaining of left-sided abdominal pain that began last night and diarrhea that began this morning.   Patient is hypertensive with a blood pressure of 154/108. The remainder the patient's vitals are within the normal limits. On exam the patient is in no acute distress, heart is regular rate and rhythm, lungs are clear to auscultation bilaterally, abdomen is soft with left upper and lower quadrant tenderness but no rebound or guarding, no CVA tenderness. Differential diagnosis includes ulcerative colitis flare and gastroenteritis. On chart review the patient has not had imaging in the last 2 years. Will order CBC, CMP, lipase, urine hCG, and CT of the abdomen and pelvis. We will treat the patient's symptoms with Toradol, Zofran, and IV fluids. The patient reports improvement in her symptoms after treatment. EKG obtained in triage shows rate 80, sinus rhythm, normal axis, normal intervals, no ST elevation or depression. Hemoglobin is stable at 13.4. The remainder the patient's lab work is reviewed and without actionable derangements. CT of the abdomen and pelvis shows no acute findings. Will initiate short course of prednisone. Patient is given insulin to schedule a follow-up appointment with a primary care provider. A referral to gastroenterology was placed. Return precautions are given and the patient is discharged. Amount and/or Complexity of Data Reviewed  Labs: ordered. Radiology: ordered. Risk  Prescription drug management.           Disposition  Final diagnoses:   Diarrhea   Abdominal pain   Ulcerative colitis ([address])     Time reflects when diagnosis was documented in both MDM as applicable and the Disposition within this note       Time User Action Codes Description Comment    11/14/2023  3:48 PM Enma FREDERICK Add [R19.7] Diarrhea     11/14/2023  3:49 PM Enma FREDERICK Add [R10.9] Abdominal pain     11/14/2023  3:50 PM Enma FREDERICK Add [K51.90] Ulcerative colitis [facility])           ED Disposition       ED Disposition   Discharge    Condition   Stable    Date/Time   Tue Nov 14, 2023  4:48 PM    Comment   Juvenal Carlson discharge to home/self care. Follow-up Information       Follow up With Specialties Details Why [address] [facility] Emergency Department Emergency Medicine Go to  If symptoms worsen [address] [facility], [address] [place] [address], [facility]    [address] [facility]  Call   [phone]               Discharge Medication List as of 11/14/2023  4:48 PM        START taking these medications    Details   predniSONE 20 mg tablet Take 2 tablets (40 mg total) by mouth daily for 5 days, Starting Tue 11/14/2023, Until Sun 11/19/2023, Normal           CONTINUE these medications which have NOT CHANGED    Details   acetaminophen (TYLENOL) 325 mg tablet Take 2 tablets (650 mg total) by mouth every 6 (six) hours, Starting Thu 8/18/2022, Normal      celecoxib (CeleBREX) 200 mg capsule Starting Wed 5/10/2023, Historical Med      meloxicam (MOBIC) 15 mg tablet Take 15 mg by mouth daily, Historical Med      methimazole (TAPAZOLE) 5 mg tablet Take 0.5 tablets (2.5 mg total) by mouth 3 (three) times a day, Starting Mon 5/15/2023, Normal      metoprolol succinate (TOPROL-XL) 25 mg 24 hr tablet Take 25 mg by mouth daily, Starting Fri 7/29/2022, Historical Med               PDMP Review       None             ED Provider  Attending physically available and evaluated Juvenal Carlson. I managed the patient along with the ED Attending.     Electronically Signed by           Victor M Leon DO  11/14/23 1934

## 2023-11-14 NOTE — DISCHARGE INSTRUCTIONS
You were seen in the emergency department for symptoms consistent with an ulcerative colitis flare. Your lab work and CT scan did not show any concerning findings. You were treated with Toradol and prednisone. A prescription for prednisone was sent to your pharmacy. Continue taking over-the-counter medications for your pain. Call the number attached to schedule a follow-up appointment with a primary care provider. A referral to gastroenterology was placed. You will receive a call. If you have worsening symptoms or any new concerning symptoms please return to the emergency department.

## 2023-11-14 NOTE — Clinical Note
Allena Smoker was seen and treated in our emergency department on 11/14/2023. Diagnosis: UC flare    Soila Middleton  may return to work on return date. She may return on this date: 11/18/2023    Soila Middleton may return to work on 11/18/2023 or once her symptoms have improved. If you have any questions or concerns, please don't hesitate to call.       Mckenna Dias, DO    ______________________________           _______________          _______________  Hospital Representative                              Date                                Time

## 2023-11-14 NOTE — Clinical Note
Micaela Dixonharlan was seen and treated in our emergency department on 11/14/2023. Diagnosis: UC flare    Pk Caro  may return to work on return date. She may return on this date: 11/18/2023         If you have any questions or concerns, please don't hesitate to call.       Edie Gilliland,     ______________________________           _______________          _______________  Hospital Representative                              Date                                Time

## 2023-11-14 NOTE — ED ATTENDING ATTESTATION
11/14/2023  IRuslan DO, saw and evaluated the patient. I have discussed the patient with the resident/non-physician practitioner and agree with the resident's/non-physician practitioner's findings, Plan of Care, and MDM as documented in the resident's/non-physician practitioner's note, except where noted. All available labs and Radiology studies were reviewed. I was present for key portions of any procedure(s) performed by the resident/non-physician practitioner and I was immediately available to provide assistance. At this point I agree with the current assessment done in the Emergency Department. I have conducted an independent evaluation of this patient a history and physical is as follows:    Patient is a 25-year-old female history of ulcerative colitis diagnosed in 2012 initially cared for in [place], hypertension, hypothyroidism, says that she had been on daily Asacol and daily oral steroids, but has been off that for the last year and a half. Her last flareup was a year and a half ago, where she had a colonoscopy and endoscopy and reportedly told she was having good healing and her GI doctors that time in the [facility] discontinued her daily medications and said she was okay. She is been fine up until yesterday today when she had some crampy left lower abdominal pain, some nausea but no vomiting and some 1 episode of blood-tinged diarrhea today which feels like her prior flares. No travel history, no sick contacts. She currently does not have a gastroenterologist or PCP since moving. No camping, no backpacking, drinking from streams, no recent antibiotics.   She has not taken any medications for her discomfort    Medications: Denies    General:  Patient is well-appearing  Head:  Atraumatic  Eyes:  Conjunctiva pink  ENT:  Mucous membranes are moist  Neck:  Supple  Cardiac:  S1-S2, without murmurs  Lungs:  Clear to auscultation bilaterally  Abdomen: Mild left lower abdominal tenderness, no tympany, no rigidity, no guarding, no CVA tenderness  Extremities:  Normal range of motion  Neurologic:  Awake, fluent speech, normal comprehension, AAOx3  Skin:  Pink warm and dry  Psychiatric:  Alert, pleasant, cooperative      ED Course  ED Course as of 11/14/23 1339   Tue Nov 14, 2023   1338 ECG interpreted by me, sinus rhythm, rate 80, no acute ischemic or infarctive changes, no change from January 28, 2023     Labs Reviewed   LIPASE - Normal       Result Value Ref Range Status    Lipase 13  11 - 82 u/L Final   POCT PREGNANCY, URINE - Normal    EXT Preg Test, Ur Negative   Final    Control Valid   Final   CBC AND DIFFERENTIAL    WBC 8.18  4.31 - 10.16 Thousand/uL Final    RBC 4.44  3.81 - 5.12 Million/uL Final    Hemoglobin 13.4  11.5 - 15.4 g/dL Final    Hematocrit 41.2  34.8 - 46.1 % Final    MCV 93  82 - 98 fL Final    MCH 30.2  26.8 - 34.3 pg Final    MCHC 32.5  31.4 - 37.4 g/dL Final    RDW 13.6  11.6 - 15.1 % Final    MPV 10.7  8.9 - 12.7 fL Final    Platelets 504  615 - 390 Thousands/uL Final    nRBC 0  /100 WBCs Final    Neutrophils Relative 61  43 - 75 % Final    Immat GRANS % 0  0 - 2 % Final    Lymphocytes Relative 26  14 - 44 % Final    Monocytes Relative 8  4 - 12 % Final    Eosinophils Relative 4  0 - 6 % Final    Basophils Relative 1  0 - 1 % Final    Neutrophils Absolute 5.01  1.85 - 7.62 Thousands/µL Final    Immature Grans Absolute 0.02  0.00 - 0.20 Thousand/uL Final    Lymphocytes Absolute 2.10  0.60 - 4.47 Thousands/µL Final    Monocytes Absolute 0.65  0.17 - 1.22 Thousand/µL Final    Eosinophils Absolute 0.34  0.00 - 0.61 Thousand/µL Final    Basophils Absolute 0.06  0.00 - 0.10 Thousands/µL Final   COMPREHENSIVE METABOLIC PANEL    Sodium 254  135 - 147 mmol/L Final    Potassium 4.0  3.5 - 5.3 mmol/L Final    Chloride 106  96 - 108 mmol/L Final    CO2 26  21 - 32 mmol/L Final    ANION GAP 4  mmol/L Final    BUN 12  5 - 25 mg/dL Final    Creatinine 0.74  0.60 - 1.30 mg/dL Final    Comment: Standardized to IDMS reference method    Glucose 91  65 - 140 mg/dL Final    Comment: If the patient is fasting, the ADA then defines impaired fasting glucose as > 100 mg/dL and diabetes as > or equal to 123 mg/dL. Calcium 9.2  8.4 - 10.2 mg/dL Final    AST 17  13 - 39 U/L Final    ALT 11  7 - 52 U/L Final    Comment: Specimen collection should occur prior to Sulfasalazine administration due to the potential for falsely depressed results. Alkaline Phosphatase 78  34 - 104 U/L Final    Total Protein 7.6  6.4 - 8.4 g/dL Final    Albumin 4.3  3.5 - 5.0 g/dL Final    Total Bilirubin 0.28  0.20 - 1.00 mg/dL Final    Comment: Use of this assay is not recommended for patients undergoing treatment with eltrombopag due to the potential for falsely elevated results. N-acetyl-p-benzoquinone imine (metabolite of Acetaminophen) will generate erroneously low results in samples for patients that have taken an overdose of Acetaminophen. eGFR 105  ml/min/1.73sq m Final    Narrative:     [place] guidelines for Chronic Kidney Disease (CKD):     Stage 1 with normal or high GFR (GFR > 90 mL/min/1.73 square meters)    Stage 2 Mild CKD (GFR = 60-89 mL/min/1.73 square meters)    Stage 3A Moderate CKD (GFR = 45-59 mL/min/1.73 square meters)    Stage 3B Moderate CKD (GFR = 30-44 mL/min/1.73 square meters)    Stage 4 Severe CKD (GFR = 15-29 mL/min/1.73 square meters)    Stage 5 End Stage CKD (GFR <15 mL/min/1.73 square meters)  Note: GFR calculation is accurate only with a steady state creatinine     CT abdomen pelvis with contrast   Final Result      No acute findings in the abdomen or pelvis. Resident: Tono Stevens, the attending radiologist, have reviewed the images and agree with the final report above. Workstation performed: [id number]           At this point the cause the patient's symptoms is unclear but unlikely represents an acute emergency.   There is no signs of life-threatening ectopic pregnancy, no signs of diverticulitis, acute intestinal perforation or rupture, or obvious signs of informatory bowel disease per CT. It may be a mild exacerbation of her underlying Crohn's disease. If discharged with return precautions as well as short course of prednisone and ambulatory referral to gastroenterology is appropriate and patient is comfortable with this plan. While the cause of the patient's complaints is most likely benign, it is possible that this is the early presentation of a more serious condition. This diagnostic uncertainty was discussed with the patient, as was the importance of follow up care, as well as the need to return to immediately return to the closest emergency department for the signs/symptoms in the discharge instruction sheets, or they were otherwise concerned about their medical condition. The patient stated they were aware of this diagnostic uncertainty, understood the importance of follow up  and were comfortable being discharged. Supportive care, importance of follow-up and return precautions were discussed with the patient, who expressed understanding. DIAGNOSIS:  Acute abdominal pain, history of ulcerative colitis    MEDICAL DECISION MAKING CODING      Patient presents with acute new problem with:  Threat to life or bodily function      Chronic conditions affecting care: As per HPI    COLLECTION AND INTERPRETATION OF DATA  I reviewed prior external notes, including previous ECG as noted above, April 20, 2022 gastroenterology office visit    I ordered each unique test  Tests reviewed personally by me:  ECG: See my ED course  Labs: See above  Imaging: I independently reviewed the abdomen and pelvis CT and found no acute pathology. RISK  Drugs (OTC, Rx, Controlled substances): Prescription management  All of the patient's current prescription medications should be continued.       Surgery  -I considered surgery may be necessary prior to completion of the work up but afterwards there is no indication for immediate surgery    Social Determinants of Health:  No primary care physician        Critical Care Time  Procedures

## 2023-11-17 ENCOUNTER — ANESTHESIA EVENT (OUTPATIENT)
Dept: ANESTHESIOLOGY | Facility: HOSPITAL | Age: 33
End: 2023-11-17

## 2023-11-17 ENCOUNTER — OFFICE VISIT (OUTPATIENT)
Dept: GASTROENTEROLOGY | Facility: CLINIC | Age: 33
End: 2023-11-17

## 2023-11-17 ENCOUNTER — ANESTHESIA EVENT (OUTPATIENT)
Dept: GASTROENTEROLOGY | Facility: AMBULARY SURGERY CENTER | Age: 33
End: 2023-11-17

## 2023-11-17 ENCOUNTER — ANESTHESIA (OUTPATIENT)
Dept: ANESTHESIOLOGY | Facility: HOSPITAL | Age: 33
End: 2023-11-17

## 2023-11-17 ENCOUNTER — TELEPHONE (OUTPATIENT)
Dept: GASTROENTEROLOGY | Facility: CLINIC | Age: 33
End: 2023-11-17

## 2023-11-17 ENCOUNTER — APPOINTMENT (OUTPATIENT)
Dept: LAB | Facility: CLINIC | Age: 33
End: 2023-11-17
Payer: COMMERCIAL

## 2023-11-17 VITALS
BODY MASS INDEX: 32.43 KG/M2 | SYSTOLIC BLOOD PRESSURE: 136 MMHG | DIASTOLIC BLOOD PRESSURE: 90 MMHG | WEIGHT: 214 LBS | HEIGHT: 68 IN | TEMPERATURE: 98.6 F

## 2023-11-17 DIAGNOSIS — K51.90 ULCERATIVE COLITIS (HCC): ICD-10-CM

## 2023-11-17 DIAGNOSIS — A09 DIARRHEA OF INFECTIOUS ORIGIN: Primary | ICD-10-CM

## 2023-11-17 DIAGNOSIS — R10.9 ABDOMINAL PAIN, UNSPECIFIED ABDOMINAL LOCATION: ICD-10-CM

## 2023-11-17 LAB
BASOPHILS # BLD AUTO: 0.09 THOUSANDS/ÂΜL (ref 0–0.1)
BASOPHILS NFR BLD AUTO: 1 % (ref 0–1)
CRP SERPL QL: 1.7 MG/L
EOSINOPHIL # BLD AUTO: 0.29 THOUSAND/ÂΜL (ref 0–0.61)
EOSINOPHIL NFR BLD AUTO: 3 % (ref 0–6)
ERYTHROCYTE [DISTWIDTH] IN BLOOD BY AUTOMATED COUNT: 13.7 % (ref 11.6–15.1)
HCT VFR BLD AUTO: 42.3 % (ref 34.8–46.1)
HGB BLD-MCNC: 13.8 G/DL (ref 11.5–15.4)
IMM GRANULOCYTES # BLD AUTO: 0.03 THOUSAND/UL (ref 0–0.2)
IMM GRANULOCYTES NFR BLD AUTO: 0 % (ref 0–2)
LYMPHOCYTES # BLD AUTO: 2.51 THOUSANDS/ÂΜL (ref 0.6–4.47)
LYMPHOCYTES NFR BLD AUTO: 25 % (ref 14–44)
MCH RBC QN AUTO: 30.9 PG (ref 26.8–34.3)
MCHC RBC AUTO-ENTMCNC: 32.6 G/DL (ref 31.4–37.4)
MCV RBC AUTO: 95 FL (ref 82–98)
MONOCYTES # BLD AUTO: 0.85 THOUSAND/ÂΜL (ref 0.17–1.22)
MONOCYTES NFR BLD AUTO: 8 % (ref 4–12)
NEUTROPHILS # BLD AUTO: 6.49 THOUSANDS/ÂΜL (ref 1.85–7.62)
NEUTS SEG NFR BLD AUTO: 63 % (ref 43–75)
NRBC BLD AUTO-RTO: 0 /100 WBCS
PLATELET # BLD AUTO: 299 THOUSANDS/UL (ref 149–390)
PMV BLD AUTO: 11.1 FL (ref 8.9–12.7)
RBC # BLD AUTO: 4.47 MILLION/UL (ref 3.81–5.12)
WBC # BLD AUTO: 10.26 THOUSAND/UL (ref 4.31–10.16)

## 2023-11-17 PROCEDURE — 86140 C-REACTIVE PROTEIN: CPT

## 2023-11-17 PROCEDURE — 86480 TB TEST CELL IMMUN MEASURE: CPT

## 2023-11-17 PROCEDURE — 36415 COLL VENOUS BLD VENIPUNCTURE: CPT

## 2023-11-17 PROCEDURE — 85025 COMPLETE CBC W/AUTO DIFF WBC: CPT

## 2023-11-17 PROCEDURE — 80074 ACUTE HEPATITIS PANEL: CPT

## 2023-11-17 RX ORDER — TRAMADOL HYDROCHLORIDE 50 MG/1
50 TABLET ORAL EVERY 6 HOURS PRN
Qty: 20 TABLET | Refills: 0 | Status: SHIPPED | OUTPATIENT
Start: 2023-11-17

## 2023-11-17 NOTE — H&P (VIEW-ONLY)
Vinod Batista [facility] - Outpatient Follow-up Note  Bill Lemus 26 y.o. female MRN: [id number]  Encounter: [id number]          ASSESSMENT AND PLAN:      Zee Her is a 27 y/o female with UC, hx of Grave's disease, hx of latent TB s/p  isoniazid tx  who presents for follow-up of UC. Ulcerative colitis  LLQ pain  Bloody diarrhea   Pt dx with UC in 2012 in [place], [place]. Pt was on lialda. Pt went tot he ED 3 days ago for bloody diarrhea and abdominal pain; CT, CBC, CMP, lipase were all WNL, so she was started on prednisone 40 mg daily x 5 days. EGD/colonoscopy 5/2022 WNL without signs of active UC. She was having diarrhea at the time, so xifaxan was sent in for suspected IBS-D. Today: pt says she was doing well off of asacol for at least 2 yrs and has not needed to be on meds for her UC since until a few days ago, when she abruptly started with 10/10 abdominal pain and bloody diarrhea. She says since leaving the ED and being on prednisone for 2 days, she has only had very mild relief as the symptoms are still present.    -fecal miriam, stool enteric panel, c.diff, O&P, giardia, CRP, CBC ordered to be done ASAP  -hepatitis, quant gold in case she warrants biologics in the future   -repeat EGD/Colonoscopy ordered to be done ASAP if stool studies are non-diagnostic   -as pt is going to do stool studies today, I am ok with her continuing on her prednisone 40 mg daily for now but I explained based on emanuel stool study results: she may warrant increase in her prednisone to 60 mg and then taper VS re-starting Asacol until we cna get her in for scopes   -follow-up with IBD team: I discussed mediterranean diet and importance of being UTD on vaccination status, which she says she will check with her PCP  -pt says the pain is still a 9/10 so she requests short-course of tramadol: I sent in a short course with no refills     -ED precautions: presyncope or syncope, high-volume BRB AL or hematochezia, hematemesis, 10/10 pain, fevers/chills     -will have our clinical team follow-up with pt on Monday to see how she is doing  ______________________________________________________________________    SUBJECTIVE:  Ingris Reddy is a 25 y/o female with UC, hx of Grave's disease who presents for follow-up of UC. Pt says that she was doing well off of her mesalamine for at least 2 yrs but a few days ago, she started to get 10/10 LLQ pain with bloody diarrhea. She says since being on prednisone, her pain is still at least a 9/10. She says the bleeding has decreased but she is still having "several episodes of diarrhea" per day. She denies skin lesions or ashes, joint pains, unintentional weight loss, fevers, chills, night sweats, black BM. She says her sister has Crohn's disease. Pt denies new meds, supplements or antibiotics prior to this. She denies sick contacts and recent travel. REVIEW OF SYSTEMS IS OTHERWISE NEGATIVE. Historical Information   Past Medical History:   Diagnosis Date    Abnormal Pap smear of cervix     Abnormal uterine bleeding (AUB) 8/14/2022    Gonorrhea     History of positive PPD     Hypertension     Hypothyroid     Ulcerative colitis ([address])      Past Surgical History:   Procedure Laterality Date    APPENDECTOMY      WI HYSTEROSCOPY BX ENDOMETRIUM&/POLYPC W/WO D&C N/A 8/17/2022    Procedure: DILATATION AND CURETTAGE (D&C) WITH HYSTEROSCOPY;  Surgeon: Portia Hill MD;  [facility];  Service: Gynecology    REMOVAL OF INTRAUTERINE DEVICE (IUD) N/A 8/17/2022    Procedure: REMOVAL OF INTRAUTERINE DEVICE (IUD);   Surgeon: Portia Hill MD;  [facility];  Service: Gynecology     Social History   Social History     Substance and Sexual Activity   Alcohol Use Yes    Comment: rare     Social History     Substance and Sexual Activity   Drug Use Not Currently    Types: Marijuana     Social History     Tobacco Use   Smoking Status Former    Packs/day: 0.00    Years: 5.00    Total pack years: 0.00    Types: Cigars, Cigarettes    Start date: 2015    Quit date: 2022    Years since quittin.2   Smokeless Tobacco Never     Family History   Problem Relation Age of Onset    Diabetes Mother     Lupus Mother        Meds/Allergies       Current Outpatient Medications:     acetaminophen (TYLENOL) 325 mg tablet    celecoxib (CeleBREX) 200 mg capsule    meloxicam (MOBIC) 15 mg tablet    methimazole (TAPAZOLE) 5 mg tablet    metoprolol succinate (TOPROL-XL) 25 mg 24 hr tablet    predniSONE 20 mg tablet    Allergies   Allergen Reactions    Shellfish Allergy - Food Allergy Other (See Comments) and Rash     Itching and swelling of mouth & lips           Objective     Last menstrual period 10/19/2023, not currently breastfeeding. There is no height or weight on file to calculate BMI. PHYSICAL EXAM:      General Appearance:   Alert, cooperative, no distress   HEENT:   Normocephalic, atraumatic, anicteric. Neck:  Supple, symmetrical, trachea midline   Lungs:   Clear to auscultation bilaterally; no rales, rhonchi or wheezing; respirations unlabored    Heart[de-identified]   Regular rate and rhythm; no murmur, rub, or gallop. Abdomen:   Soft, non-tender, non-distended; normal bowel sounds; no masses, no organomegaly    Genitalia:   Deferred    Rectal:   Deferred    Extremities:  No cyanosis, clubbing or edema    Pulses:  2+ and symmetric    Skin:  No jaundice, rashes, or lesions    Lymph nodes:  No palpable cervical lymphadenopathy        Lab Results:   No visits with results within 1 Day(s) from this visit.    Latest known visit with results is:   Admission on 2023, Discharged on 2023   Component Date Value    Ventricular Rate 2023 80     Atrial Rate 2023 80     NC Interval 2023 162     QRSD Interval 2023 72     QT Interval 2023 388     QTC Interval 2023 447     P Axis 2023 56     QRS Axis 2023 43     T Wave [place] 2023 35     WBC 2023 8. 18     RBC 11/14/2023 4.44     Hemoglobin 11/14/2023 13.4     Hematocrit 11/14/2023 41.2     MCV 11/14/2023 93     MCH 11/14/2023 30.2     MCHC 11/14/2023 32.5     RDW 11/14/2023 13.6     MPV 11/14/2023 10.7     Platelets 26/87/6890 275     nRBC 11/14/2023 0     Neutrophils Relative 11/14/2023 61     Immat GRANS % 11/14/2023 0     Lymphocytes Relative 11/14/2023 26     Monocytes Relative 11/14/2023 8     Eosinophils Relative 11/14/2023 4     Basophils Relative 11/14/2023 1     Neutrophils Absolute 11/14/2023 5.01     Immature Grans Absolute 11/14/2023 0.02     Lymphocytes Absolute 11/14/2023 2.10     Monocytes Absolute 11/14/2023 0.65     Eosinophils Absolute 11/14/2023 0.34     Basophils Absolute 11/14/2023 0.06     Sodium 11/14/2023 136     Potassium 11/14/2023 4.0     Chloride 11/14/2023 106     CO2 11/14/2023 26     ANION GAP 11/14/2023 4     BUN 11/14/2023 12     Creatinine 11/14/2023 0.74     Glucose 11/14/2023 91     Calcium 11/14/2023 9.2     AST 11/14/2023 17     ALT 11/14/2023 11     Alkaline Phosphatase 11/14/2023 78     Total Protein 11/14/2023 7.6     Albumin 11/14/2023 4.3     Total Bilirubin 11/14/2023 0.28     eGFR 11/14/2023 105     Lipase 11/14/2023 13     EXT Preg Test, Ur 11/14/2023 Negative     Control 11/14/2023 Valid          Radiology Results:   CT abdomen pelvis with contrast    Result Date: 11/14/2023  Narrative: CT ABDOMEN AND PELVIS WITH IV CONTRAST INDICATION: Left lower quadrant abdominal pain, diarrhea, history of ulcerative colitis. COMPARISON: CT abdomen pelvis 08/15/2022 TECHNIQUE:  CT examination of the abdomen and pelvis was performed. Multiplanar 2D reformatted images were created from the source data. This examination, like all CT scans performed in the [facility], was performed utilizing techniques to minimize radiation dose exposure, including the use of iterative reconstruction and automated exposure control.  Radiation dose length product (DLP) for this visit:  743.82 mGy-cm IV Contrast:  100 mL of iohexol (OMNIPAQUE) Enteric Contrast:  Enteric contrast was not administered. FINDINGS: ABDOMEN LOWER CHEST: Atelectatic changes are noted at the lung bases. LIVER/BILIARY TREE: Few subcentimeter sharply circumscribed low-density hepatic lesions, too small to accurately characterize, but statistically most likely to represent subcentimeter hepatic cysts. No suspicious solid hepatic lesion is identified. Hepatic contours are normal.  No biliary dilatation. GALLBLADDER:  No calcified gallstones. No pericholecystic inflammatory change. SPLEEN:  Unremarkable. PANCREAS:  Unremarkable. ADRENAL GLANDS:  Unremarkable. KIDNEYS/URETERS: 2 punctate nonobstructing calculi on the right. No hydronephrosis. STOMACH AND BOWEL:  Unremarkable. APPENDIX: There are expected postoperative changes of appendectomy. ABDOMINOPELVIC CAVITY:  There is a small volume of free pelvic fluid, likely physiologic given the patient's age and gender. No pneumoperitoneum. No lymphadenopathy. 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 PELVIS REPRODUCTIVE ORGANS: Finding previously described within the uterus has resolved. Left ovarian corpus luteum cyst noted. Trace free fluid dependent within the pelvis, likely GYN origin. URINARY BLADDER:  Unremarkable. ABDOMINAL WALL/INGUINAL REGIONS:  Unremarkable. OSSEOUS STRUCTURES:  No acute fracture or destructive osseous lesion. Mild spinal degenerative changes. Impression: No acute findings in the abdomen or pelvis. Resident: Juliette Mccord, the attending radiologist, have reviewed the images and agree with the final report above.  Workstation performed: [id number]

## 2023-11-17 NOTE — PROGRESS NOTES
[facility] - Outpatient Follow-up Note  Lilia Otero 26 y.o. female MRN: [id number]  Encounter: [id number]          ASSESSMENT AND PLAN:      Lindy Marrero is a 27 y/o female with UC, hx of Grave's disease, hx of latent TB s/p  isoniazid tx  who presents for follow-up of UC. Ulcerative colitis  LLQ pain  Bloody diarrhea   Pt dx with UC in 2012 in [place], [place]. Pt was on lialda. Pt went tot he ED 3 days ago for bloody diarrhea and abdominal pain; CT, CBC, CMP, lipase were all WNL, so she was started on prednisone 40 mg daily x 5 days. EGD/colonoscopy 5/2022 WNL without signs of active UC. She was having diarrhea at the time, so xifaxan was sent in for suspected IBS-D. Today: pt says she was doing well off of asacol for at least 2 yrs and has not needed to be on meds for her UC since until a few days ago, when she abruptly started with 10/10 abdominal pain and bloody diarrhea. She says since leaving the ED and being on prednisone for 2 days, she has only had very mild relief as the symptoms are still present.    -fecal miriam, stool enteric panel, c.diff, O&P, giardia, CRP, CBC ordered to be done ASAP  -hepatitis, quant gold in case she warrants biologics in the future   -repeat EGD/Colonoscopy ordered to be done ASAP if stool studies are non-diagnostic   -as pt is going to do stool studies today, I am ok with her continuing on her prednisone 40 mg daily for now but I explained based on emanuel stool study results: she may warrant increase in her prednisone to 60 mg and then taper VS re-starting Asacol until we cna get her in for scopes   -follow-up with IBD team: I discussed mediterranean diet and importance of being UTD on vaccination status, which she says she will check with her PCP  -pt says the pain is still a 9/10 so she requests short-course of tramadol: I sent in a short course with no refills     -ED precautions: presyncope or syncope, high-volume BRB [place] or hematochezia, hematemesis, 10/10 pain, fevers/chills     -will have our clinical team follow-up with pt on Monday to see how she is doing  ______________________________________________________________________    SUBJECTIVE:  Missy Gray is a 25 y/o female with UC, hx of Grave's disease who presents for follow-up of UC. Pt says that she was doing well off of her mesalamine for at least 2 yrs but a few days ago, she started to get 10/10 LLQ pain with bloody diarrhea. She says since being on prednisone, her pain is still at least a 9/10. She says the bleeding has decreased but she is still having "several episodes of diarrhea" per day. She denies skin lesions or ashes, joint pains, unintentional weight loss, fevers, chills, night sweats, black BM. She says her sister has Crohn's disease. Pt denies new meds, supplements or antibiotics prior to this. She denies sick contacts and recent travel. REVIEW OF SYSTEMS IS OTHERWISE NEGATIVE. Historical Information   Past Medical History:   Diagnosis Date    Abnormal Pap smear of cervix     Abnormal uterine bleeding (AUB) 8/14/2022    Gonorrhea     History of positive PPD     Hypertension     Hypothyroid     Ulcerative colitis ([address])      Past Surgical History:   Procedure Laterality Date    APPENDECTOMY      [place] HYSTEROSCOPY BX ENDOMETRIUM&/POLYPC W/WO D&C N/A 8/17/2022    Procedure: DILATATION AND CURETTAGE (D&C) WITH HYSTEROSCOPY;  Surgeon: Reinaldo Croft MD;  [facility];  Service: Gynecology    REMOVAL OF INTRAUTERINE DEVICE (IUD) N/A 8/17/2022    Procedure: REMOVAL OF INTRAUTERINE DEVICE (IUD);   Surgeon: Reinaldo Croft MD;  [facility];  Service: Gynecology     Social History   Social History     Substance and Sexual Activity   Alcohol Use Yes    Comment: rare     Social History     Substance and Sexual Activity   Drug Use Not Currently    Types: Marijuana     Social History     Tobacco Use   Smoking Status Former    Packs/day: 0.00    Years: 5.00    Total pack years: 0.00    Types: Cigars, Cigarettes    Start date: 2015    Quit date: 2022    Years since quittin.2   Smokeless Tobacco Never     Family History   Problem Relation Age of Onset    Diabetes Mother     Lupus Mother        Meds/Allergies       Current Outpatient Medications:     acetaminophen (TYLENOL) 325 mg tablet    celecoxib (CeleBREX) 200 mg capsule    meloxicam (MOBIC) 15 mg tablet    methimazole (TAPAZOLE) 5 mg tablet    metoprolol succinate (TOPROL-XL) 25 mg 24 hr tablet    predniSONE 20 mg tablet    Allergies   Allergen Reactions    Shellfish Allergy - Food Allergy Other (See Comments) and Rash     Itching and swelling of mouth & lips           Objective     Last menstrual period 10/19/2023, not currently breastfeeding. There is no height or weight on file to calculate BMI. PHYSICAL EXAM:      General Appearance:   Alert, cooperative, no distress   HEENT:   Normocephalic, atraumatic, anicteric. Neck:  Supple, symmetrical, trachea midline   Lungs:   Clear to auscultation bilaterally; no rales, rhonchi or wheezing; respirations unlabored    Heart[de-identified]   Regular rate and rhythm; no murmur, rub, or gallop. Abdomen:   Soft, non-tender, non-distended; normal bowel sounds; no masses, no organomegaly    Genitalia:   Deferred    Rectal:   Deferred    Extremities:  No cyanosis, clubbing or edema    Pulses:  2+ and symmetric    Skin:  No jaundice, rashes, or lesions    Lymph nodes:  No palpable cervical lymphadenopathy        Lab Results:   No visits with results within 1 Day(s) from this visit.    Latest known visit with results is:   Admission on 2023, Discharged on 2023   Component Date Value    Ventricular Rate 2023 80     Atrial Rate 2023 80     NH Interval 2023 162     QRSD Interval 2023 72     QT Interval 2023 388     QTC Interval 2023 447     P Axis 2023 56     QRS Axis 2023 43     T Wave [place] 2023 35     WBC 2023 8. 18     RBC 11/14/2023 4.44     Hemoglobin 11/14/2023 13.4     Hematocrit 11/14/2023 41.2     MCV 11/14/2023 93     MCH 11/14/2023 30.2     MCHC 11/14/2023 32.5     RDW 11/14/2023 13.6     MPV 11/14/2023 10.7     Platelets 59/90/9373 275     nRBC 11/14/2023 0     Neutrophils Relative 11/14/2023 61     Immat GRANS % 11/14/2023 0     Lymphocytes Relative 11/14/2023 26     Monocytes Relative 11/14/2023 8     Eosinophils Relative 11/14/2023 4     Basophils Relative 11/14/2023 1     Neutrophils Absolute 11/14/2023 5.01     Immature Grans Absolute 11/14/2023 0.02     Lymphocytes Absolute 11/14/2023 2.10     Monocytes Absolute 11/14/2023 0.65     Eosinophils Absolute 11/14/2023 0.34     Basophils Absolute 11/14/2023 0.06     Sodium 11/14/2023 136     Potassium 11/14/2023 4.0     Chloride 11/14/2023 106     CO2 11/14/2023 26     ANION GAP 11/14/2023 4     BUN 11/14/2023 12     Creatinine 11/14/2023 0.74     Glucose 11/14/2023 91     Calcium 11/14/2023 9.2     AST 11/14/2023 17     ALT 11/14/2023 11     Alkaline Phosphatase 11/14/2023 78     Total Protein 11/14/2023 7.6     Albumin 11/14/2023 4.3     Total Bilirubin 11/14/2023 0.28     eGFR 11/14/2023 105     Lipase 11/14/2023 13     EXT Preg Test, Ur 11/14/2023 Negative     Control 11/14/2023 Valid          Radiology Results:   CT abdomen pelvis with contrast    Result Date: 11/14/2023  Narrative: CT ABDOMEN AND PELVIS WITH IV CONTRAST INDICATION: Left lower quadrant abdominal pain, diarrhea, history of ulcerative colitis. COMPARISON: CT abdomen pelvis 08/15/2022 TECHNIQUE:  CT examination of the abdomen and pelvis was performed. Multiplanar 2D reformatted images were created from the source data. This examination, like all CT scans performed in the [facility], was performed utilizing techniques to minimize radiation dose exposure, including the use of iterative reconstruction and automated exposure control.  Radiation dose length product (DLP) for this visit:  743.82 mGy-cm IV Contrast:  100 mL of iohexol (OMNIPAQUE) Enteric Contrast:  Enteric contrast was not administered. FINDINGS: ABDOMEN LOWER CHEST: Atelectatic changes are noted at the lung bases. LIVER/BILIARY TREE: Few subcentimeter sharply circumscribed low-density hepatic lesions, too small to accurately characterize, but statistically most likely to represent subcentimeter hepatic cysts. No suspicious solid hepatic lesion is identified. Hepatic contours are normal.  No biliary dilatation. GALLBLADDER:  No calcified gallstones. No pericholecystic inflammatory change. SPLEEN:  Unremarkable. PANCREAS:  Unremarkable. ADRENAL GLANDS:  Unremarkable. KIDNEYS/URETERS: 2 punctate nonobstructing calculi on the right. No hydronephrosis. STOMACH AND BOWEL:  Unremarkable. APPENDIX: There are expected postoperative changes of appendectomy. ABDOMINOPELVIC CAVITY:  There is a small volume of free pelvic fluid, likely physiologic given the patient's age and gender. No pneumoperitoneum. No lymphadenopathy. 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 PELVIS REPRODUCTIVE ORGANS: Finding previously described within the uterus has resolved. Left ovarian corpus luteum cyst noted. Trace free fluid dependent within the pelvis, likely GYN origin. URINARY BLADDER:  Unremarkable. ABDOMINAL WALL/INGUINAL REGIONS:  Unremarkable. OSSEOUS STRUCTURES:  No acute fracture or destructive osseous lesion. Mild spinal degenerative changes. Impression: No acute findings in the abdomen or pelvis. Resident: Asmita Aguillon, the attending radiologist, have reviewed the images and agree with the final report above.  Workstation performed: [id number]

## 2023-11-17 NOTE — PATIENT INSTRUCTIONS
Scheduled date of EGD/colonoscopy (as of today):11/21/2023  Physician performing EGD/colonoscopy:Liat  Location of EGD/colonoscopy:  [address] [place] [address]  Desired bowel prep reviewed with patient:humberto /dulcolax  Instructions reviewed with patient by: vesta   Clearances:   NONE    Patient was put in for recall for a follow up with Tony

## 2023-11-18 ENCOUNTER — APPOINTMENT (OUTPATIENT)
Dept: LAB | Facility: CLINIC | Age: 33
End: 2023-11-18
Payer: COMMERCIAL

## 2023-11-18 DIAGNOSIS — K51.90 ULCERATIVE COLITIS (HCC): ICD-10-CM

## 2023-11-18 LAB
HAV IGM SER QL: NORMAL
HBV CORE IGM SER QL: NORMAL
HBV SURFACE AG SER QL: NORMAL
HCV AB SER QL: NORMAL

## 2023-11-18 PROCEDURE — 87209 SMEAR COMPLEX STAIN: CPT

## 2023-11-18 PROCEDURE — 87177 OVA AND PARASITES SMEARS: CPT

## 2023-11-19 LAB
GAMMA INTERFERON BACKGROUND BLD IA-ACNC: 0.01 IU/ML
M TB IFN-G BLD-IMP: POSITIVE
M TB IFN-G CD4+ BCKGRND COR BLD-ACNC: 0.52 IU/ML
M TB IFN-G CD4+ BCKGRND COR BLD-ACNC: 0.78 IU/ML
MITOGEN IGNF BCKGRD COR BLD-ACNC: 9.99 IU/ML

## 2023-11-20 ENCOUNTER — TELEPHONE (OUTPATIENT)
Dept: GASTROENTEROLOGY | Facility: CLINIC | Age: 33
End: 2023-11-20

## 2023-11-20 ENCOUNTER — TELEPHONE (OUTPATIENT)
Age: 33
End: 2023-11-20

## 2023-11-20 DIAGNOSIS — R76.12 POSITIVE QUANTIFERON-TB GOLD TEST: Primary | ICD-10-CM

## 2023-11-20 NOTE — TELEPHONE ENCOUNTER
----- Message from Betzaida White PA-C sent at 11/20/2023  9:49 AM EST -----  Please let pt know her stool studies are so far negative for infection. I know she has a hx of latent TB s/p treatment: does she follow-up with anyone for this as her quant gold was again positive? I am also ordering CXR to again screen. Thanks!

## 2023-11-21 ENCOUNTER — HOSPITAL ENCOUNTER (OUTPATIENT)
Dept: GASTROENTEROLOGY | Facility: HOSPITAL | Age: 33
Setting detail: OUTPATIENT SURGERY
Discharge: HOME | End: 2023-11-21
Payer: COMMERCIAL

## 2023-11-21 ENCOUNTER — ANESTHESIA (OUTPATIENT)
Dept: GASTROENTEROLOGY | Facility: AMBULARY SURGERY CENTER | Age: 33
End: 2023-11-21

## 2023-11-21 VITALS
SYSTOLIC BLOOD PRESSURE: 134 MMHG | TEMPERATURE: 98.6 F | DIASTOLIC BLOOD PRESSURE: 71 MMHG | HEART RATE: 76 BPM | RESPIRATION RATE: 18 BRPM | OXYGEN SATURATION: 98 %

## 2023-11-21 DIAGNOSIS — A09 DIARRHEA OF INFECTIOUS ORIGIN: ICD-10-CM

## 2023-11-21 DIAGNOSIS — R10.9 ABDOMINAL PAIN, UNSPECIFIED ABDOMINAL LOCATION: ICD-10-CM

## 2023-11-21 DIAGNOSIS — K51.90 ULCERATIVE COLITIS (HCC): ICD-10-CM

## 2023-11-21 LAB
EXT PREGNANCY TEST URINE: NEGATIVE
EXT. CONTROL: NORMAL

## 2023-11-21 PROCEDURE — 43239 EGD BIOPSY SINGLE/MULTIPLE: CPT | Performed by: INTERNAL MEDICINE

## 2023-11-21 PROCEDURE — 45380 COLONOSCOPY AND BIOPSY: CPT | Performed by: INTERNAL MEDICINE

## 2023-11-21 PROCEDURE — 81025 URINE PREGNANCY TEST: CPT | Performed by: ANESTHESIOLOGY

## 2023-11-21 PROCEDURE — 88305 TISSUE EXAM BY PATHOLOGIST: CPT | Performed by: STUDENT IN AN ORGANIZED HEALTH CARE EDUCATION/TRAINING PROGRAM

## 2023-11-21 RX ORDER — DIPHENHYDRAMINE HYDROCHLORIDE 50 MG/ML
12.5 INJECTION INTRAMUSCULAR; INTRAVENOUS ONCE AS NEEDED
Status: DISCONTINUED | OUTPATIENT
Start: 2023-11-21 | End: 2023-11-25 | Stop reason: HOSPADM

## 2023-11-21 RX ORDER — ONDANSETRON 2 MG/ML
4 INJECTION INTRAMUSCULAR; INTRAVENOUS ONCE AS NEEDED
Status: DISCONTINUED | OUTPATIENT
Start: 2023-11-21 | End: 2023-11-25 | Stop reason: HOSPADM

## 2023-11-21 RX ORDER — METOCLOPRAMIDE HYDROCHLORIDE 5 MG/ML
10 INJECTION INTRAMUSCULAR; INTRAVENOUS ONCE AS NEEDED
Status: DISCONTINUED | OUTPATIENT
Start: 2023-11-21 | End: 2023-11-25 | Stop reason: HOSPADM

## 2023-11-21 RX ORDER — PROPOFOL 10 MG/ML
INJECTION, EMULSION INTRAVENOUS AS NEEDED
Status: DISCONTINUED | OUTPATIENT
Start: 2023-11-21 | End: 2023-11-21

## 2023-11-21 RX ORDER — LIDOCAINE HYDROCHLORIDE 10 MG/ML
INJECTION, SOLUTION EPIDURAL; INFILTRATION; INTRACAUDAL; PERINEURAL AS NEEDED
Status: DISCONTINUED | OUTPATIENT
Start: 2023-11-21 | End: 2023-11-21

## 2023-11-21 RX ORDER — LIDOCAINE HYDROCHLORIDE 10 MG/ML
0.5 INJECTION, SOLUTION EPIDURAL; INFILTRATION; INTRACAUDAL; PERINEURAL ONCE AS NEEDED
Status: DISCONTINUED | OUTPATIENT
Start: 2023-11-21 | End: 2023-11-25 | Stop reason: HOSPADM

## 2023-11-21 RX ORDER — SODIUM CHLORIDE, SODIUM LACTATE, POTASSIUM CHLORIDE, CALCIUM CHLORIDE 600; 310; 30; 20 MG/100ML; MG/100ML; MG/100ML; MG/100ML
INJECTION, SOLUTION INTRAVENOUS CONTINUOUS PRN
Status: DISCONTINUED | OUTPATIENT
Start: 2023-11-21 | End: 2023-11-21

## 2023-11-21 RX ADMIN — PROPOFOL 50 MG: 10 INJECTION, EMULSION INTRAVENOUS at 12:33

## 2023-11-21 RX ADMIN — PROPOFOL 50 MG: 10 INJECTION, EMULSION INTRAVENOUS at 12:40

## 2023-11-21 RX ADMIN — PROPOFOL 50 MG: 10 INJECTION, EMULSION INTRAVENOUS at 12:46

## 2023-11-21 RX ADMIN — SODIUM CHLORIDE, SODIUM LACTATE, POTASSIUM CHLORIDE, AND CALCIUM CHLORIDE: .6; .31; .03; .02 INJECTION, SOLUTION INTRAVENOUS at 12:08

## 2023-11-21 RX ADMIN — PROPOFOL 100 MG: 10 INJECTION, EMULSION INTRAVENOUS at 12:28

## 2023-11-21 RX ADMIN — PROPOFOL 100 MG: 10 INJECTION, EMULSION INTRAVENOUS at 12:37

## 2023-11-21 RX ADMIN — PROPOFOL 50 MG: 10 INJECTION, EMULSION INTRAVENOUS at 12:32

## 2023-11-21 RX ADMIN — LIDOCAINE HYDROCHLORIDE 100 MG: 10 INJECTION, SOLUTION EPIDURAL; INFILTRATION; INTRACAUDAL; PERINEURAL at 12:28

## 2023-11-21 RX ADMIN — PROPOFOL 50 MG: 10 INJECTION, EMULSION INTRAVENOUS at 12:30

## 2023-11-21 NOTE — ANESTHESIA POSTPROCEDURE EVALUATION
Post-Op Assessment Note    CV Status:  Stable    Pain management: adequate       Mental Status:  Sleepy   Hydration Status:  Euvolemic   PONV Controlled:  Controlled   Airway Patency:  Patent  Two or more mitigation strategies used for obstructive sleep apnea   Post Op Vitals Reviewed: Yes    No anethesia notable event occurred.     Staff: Anesthesiologist, CRNA             BP      Temp      Pulse     Resp      SpO2

## 2023-11-21 NOTE — ANESTHESIA PREPROCEDURE EVALUATION
Procedure:  COLONOSCOPY  EGD    Relevant Problems   ANESTHESIA (within normal limits)      CARDIO   (+) Hypertension      ENDO   (+) Hyperthyroidism      GI/HEPATIC (within normal limits)      /RENAL (within normal limits)      GYN (within normal limits)      HEMATOLOGY (within normal limits)      MUSCULOSKELETAL (within normal limits)      NEURO/PSYCH (within normal limits)      PULMONARY (within normal limits)      Digestive   (+) Ulcerative colitis (HCC)      Endocrine   (+) Graves' disease        Physical Exam    Airway    Mallampati score: II  TM Distance: >3 FB  Neck ROM: full     Dental   No notable dental hx     Cardiovascular  Rhythm: regular, Rate: normal, Cardiovascular exam normal    Pulmonary  Pulmonary exam normal Breath sounds clear to auscultation    Other Findings  post-pubertal.      Anesthesia Plan  ASA Score- 2     Anesthesia Type- IV sedation with anesthesia with ASA Monitors. Additional Monitors:     Airway Plan:            Plan Factors-Exercise tolerance (METS): >4 METS. Chart reviewed. EKG reviewed. Imaging results reviewed. Existing labs reviewed. Patient summary reviewed. Induction- intravenous. Postoperative Plan-     Informed Consent- Anesthetic plan and risks discussed with patient. I personally reviewed this patient with the CRNA. Discussed and agreed on the Anesthesia Plan with the CRNA. Joanne Delaney Recent labs personally reviewed:  Lab Results   Component Value Date    WBC 10.26 (H) 11/17/2023    HGB 13.8 11/17/2023     11/17/2023     Lab Results   Component Value Date    K 4.0 11/14/2023    BUN 12 11/14/2023    CREATININE 0.74 11/14/2023     No results found for: "PTT"   Lab Results   Component Value Date    INR 1.04 08/14/2022       Blood type O    No results found for: "HGBA1C"    I, Isidoro Ch MD, have personally seen and evaluated the patient prior to anesthetic care.   I have reviewed the pre-anesthetic record, and other medical records if appropriate to the anesthetic care. If a CRNA is involved in the case, I have reviewed the CRNA assessment, if present, and agree. Risks/benefits and alternatives discussed with patient including possible PONV, sore throat, and possibility of rare anesthetic and surgical emergencies.

## 2023-11-21 NOTE — INTERVAL H&P NOTE
H&P reviewed. After examining the patient I find no changes in the patients condition since the H&P had been written.     Vitals:    11/21/23 1059   Pulse: 77   Resp: 18   Temp: 97.5 °F (36.4 °C)   SpO2: 98%

## 2023-11-29 PROCEDURE — 88305 TISSUE EXAM BY PATHOLOGIST: CPT | Performed by: STUDENT IN AN ORGANIZED HEALTH CARE EDUCATION/TRAINING PROGRAM

## 2023-12-05 DIAGNOSIS — R19.7 DIARRHEA, UNSPECIFIED TYPE: Primary | ICD-10-CM

## 2024-02-28 ENCOUNTER — OFFICE VISIT (OUTPATIENT)
Dept: GASTROENTEROLOGY | Facility: CLINIC | Age: 34
End: 2024-02-28
Payer: COMMERCIAL

## 2024-02-28 ENCOUNTER — HOSPITAL ENCOUNTER (EMERGENCY)
Facility: HOSPITAL | Age: 34
Discharge: HOME | End: 2024-02-28
Payer: COMMERCIAL

## 2024-02-28 VITALS
HEIGHT: 69 IN | DIASTOLIC BLOOD PRESSURE: 94 MMHG | WEIGHT: 208 LBS | SYSTOLIC BLOOD PRESSURE: 160 MMHG | BODY MASS INDEX: 30.81 KG/M2 | TEMPERATURE: 98.8 F

## 2024-02-28 VITALS
DIASTOLIC BLOOD PRESSURE: 82 MMHG | SYSTOLIC BLOOD PRESSURE: 165 MMHG | OXYGEN SATURATION: 99 % | TEMPERATURE: 98.3 F | HEART RATE: 69 BPM | RESPIRATION RATE: 18 BRPM

## 2024-02-28 DIAGNOSIS — R10.32 LEFT LOWER QUADRANT ABDOMINAL PAIN: Primary | ICD-10-CM

## 2024-02-28 DIAGNOSIS — T14.8XXA BRUISING: ICD-10-CM

## 2024-02-28 LAB
ALBUMIN SERPL BCP-MCNC: 4.6 G/DL (ref 3.5–5)
ALP SERPL-CCNC: 70 U/L (ref 34–104)
ALT SERPL W P-5'-P-CCNC: 10 U/L (ref 7–52)
ANION GAP SERPL CALCULATED.3IONS-SCNC: 4 MMOL/L
AST SERPL W P-5'-P-CCNC: 16 U/L (ref 13–39)
ATRIAL RATE: 66 BPM
BASOPHILS # BLD AUTO: 0.07 THOUSANDS/ÂΜL (ref 0–0.1)
BASOPHILS NFR BLD AUTO: 1 % (ref 0–1)
BILIRUB SERPL-MCNC: 0.26 MG/DL (ref 0.2–1)
BUN SERPL-MCNC: 11 MG/DL (ref 5–25)
CALCIUM SERPL-MCNC: 9.5 MG/DL (ref 8.4–10.2)
CARDIAC TROPONIN I PNL SERPL HS: <2 NG/L
CHLORIDE SERPL-SCNC: 104 MMOL/L (ref 96–108)
CO2 SERPL-SCNC: 28 MMOL/L (ref 21–32)
CREAT SERPL-MCNC: 0.76 MG/DL (ref 0.6–1.3)
EOSINOPHIL # BLD AUTO: 0.42 THOUSAND/ÂΜL (ref 0–0.61)
EOSINOPHIL NFR BLD AUTO: 7 % (ref 0–6)
ERYTHROCYTE [DISTWIDTH] IN BLOOD BY AUTOMATED COUNT: 13.2 % (ref 11.6–15.1)
GFR SERPL CREATININE-BSD FRML MDRD: 101 ML/MIN/1.73SQ M
GLUCOSE SERPL-MCNC: 65 MG/DL (ref 65–140)
HCT VFR BLD AUTO: 40.6 % (ref 34.8–46.1)
HGB BLD-MCNC: 13.4 G/DL (ref 11.5–15.4)
IMM GRANULOCYTES # BLD AUTO: 0.01 THOUSAND/UL (ref 0–0.2)
IMM GRANULOCYTES NFR BLD AUTO: 0 % (ref 0–2)
LIPASE SERPL-CCNC: 7 U/L (ref 11–82)
LYMPHOCYTES # BLD AUTO: 1.65 THOUSANDS/ÂΜL (ref 0.6–4.47)
LYMPHOCYTES NFR BLD AUTO: 28 % (ref 14–44)
MCH RBC QN AUTO: 30.9 PG (ref 26.8–34.3)
MCHC RBC AUTO-ENTMCNC: 33 G/DL (ref 31.4–37.4)
MCV RBC AUTO: 94 FL (ref 82–98)
MONOCYTES # BLD AUTO: 0.67 THOUSAND/ÂΜL (ref 0.17–1.22)
MONOCYTES NFR BLD AUTO: 11 % (ref 4–12)
NEUTROPHILS # BLD AUTO: 3.08 THOUSANDS/ÂΜL (ref 1.85–7.62)
NEUTS SEG NFR BLD AUTO: 53 % (ref 43–75)
NRBC BLD AUTO-RTO: 0 /100 WBCS
P AXIS: 79 DEGREES
PLATELET # BLD AUTO: 283 THOUSANDS/UL (ref 149–390)
PMV BLD AUTO: 10.6 FL (ref 8.9–12.7)
POTASSIUM SERPL-SCNC: 3.6 MMOL/L (ref 3.5–5.3)
PR INTERVAL: 164 MS
PROT SERPL-MCNC: 7.6 G/DL (ref 6.4–8.4)
QRS AXIS: 84 DEGREES
QRSD INTERVAL: 80 MS
QT INTERVAL: 414 MS
QTC INTERVAL: 434 MS
RBC # BLD AUTO: 4.33 MILLION/UL (ref 3.81–5.12)
SODIUM SERPL-SCNC: 136 MMOL/L (ref 135–147)
T WAVE AXIS: 28 DEGREES
VENTRICULAR RATE: 66 BPM
WBC # BLD AUTO: 5.9 THOUSAND/UL (ref 4.31–10.16)

## 2024-02-28 PROCEDURE — 85025 COMPLETE CBC W/AUTO DIFF WBC: CPT

## 2024-02-28 PROCEDURE — 36415 COLL VENOUS BLD VENIPUNCTURE: CPT

## 2024-02-28 PROCEDURE — 83690 ASSAY OF LIPASE: CPT

## 2024-02-28 PROCEDURE — 93005 ELECTROCARDIOGRAM TRACING: CPT

## 2024-02-28 PROCEDURE — 80053 COMPREHEN METABOLIC PANEL: CPT

## 2024-02-28 PROCEDURE — 93010 ELECTROCARDIOGRAM REPORT: CPT | Performed by: INTERNAL MEDICINE

## 2024-02-28 PROCEDURE — 84484 ASSAY OF TROPONIN QUANT: CPT

## 2024-02-28 PROCEDURE — 99244 OFF/OP CNSLTJ NEW/EST MOD 40: CPT | Performed by: INTERNAL MEDICINE

## 2024-02-28 RX ORDER — DICYCLOMINE HCL 20 MG
20 TABLET ORAL EVERY 6 HOURS PRN
Qty: 90 TABLET | Refills: 3 | Status: SHIPPED | OUTPATIENT
Start: 2024-02-28

## 2024-02-28 NOTE — ED NOTES
Pt resting in waiting room at this time. Accompanied by family. No signs of distress     Raine Toth RN  02/28/24 [id number]

## 2024-02-28 NOTE — PROGRESS NOTES
[facility] - Outpatient Consultation  Nely Velazquez 34 y.o. female MRN: [id number]  Encounter: [id number]    PCP:  Rakesh Reyes MD, [phone]  Referring Provider:  No ref. provider found,         ASSESSMENT AND PLAN:      34 y.o. female with medical history significant for UC (dx 2012) previously on Lialda, h/o latent TB s/p Isoniazid, hyperthyroidism, vitamin D deficiency, and HTN, whom we are seeing in consultation for L sided abdominal pain.    Suspect her abdominal pain is likely secondary to IBS in the setting of recent work-related stressors. She does not have any other associated complaints. Recommended a trial of Bentyl PRN to see if this helps relieve her cramping abdominal pain. There is no blood, diarrhea, or mucous to suggest concern for IBD. She also reports inability to sleep at night and we discussed lack of sleep disrupts our circadian rhythm which also affects our gut. Recommended she discuss treatment options with her PCP and work on a sleep diary with best sleep hygiene which is likely also contributing to her GI symptoms. If no improvement with use of Bentyl, we will consider starting Amitriptyline.     FOLLOW-UP:  Return in about 2 months (around 4/28/2024).    VISIT DIAGNOSES AND ORDERS:      1. Left lower quadrant abdominal pain    2. Bruising      Orders Placed This Encounter   Procedures    CBC and Platelet    Comprehensive metabolic panel     ______________________________________________________________________    HPI:  Ms. Nely Velazquez is a 34 y.o. female with medical history significant for UC (dx 2012) previously on Lialda, h/o latent TB s/p Isoniazid, hyperthyroidism, vitamin D deficiency, and HTN, whom we are seeing in consultation for L sided abdominal pain.    She reports having extreme cramping pain in the left upper abdomen over the last two weeks which does not seem to go away.  Pain was initially intermittent and now more persistent with  intensified squeezing pain. She does note round circular bruising preceding the abdominal pain. She now also new onset bruises. She does report associated subjective fevers, dizziness, and brain fog.     Denies heartburn, dysphagia, odynophagia, nausea, vomiting, diarrhea, constipation, BRBPR, melena, abdominal pain, change in bowel habits, weight loss.     COY 2023- mild erythematous mucosa at the IC valve not c/w IBD.  EGD 2023- normal    REVIEW OF SYSTEMS:  10 point ROS reviewed and negative except otherwise noted in the HPI above.     Historical Information   Patient Active Problem List   Diagnosis    Hypertension    Ulcerative colitis (HCC)    Hyperthyroidism    Graves' disease    Vitamin D deficiency    Other fatigue     Past Medical History:   Diagnosis Date    Abnormal Pap smear of cervix     Abnormal uterine bleeding (AUB) 2022    Gonorrhea     History of positive PPD     Hypertension     Hypothyroid     Ulcerative colitis (HCC)      Past Surgical History:   Procedure Laterality Date    APPENDECTOMY      NJ HYSTEROSCOPY BX ENDOMETRIUM&/POLYPC W/WO D&C N/A 2022    Procedure: DILATATION AND CURETTAGE (D&C) WITH HYSTEROSCOPY;  Surgeon: Julia Abad MD;  [facility];  Service: Gynecology    REMOVAL OF INTRAUTERINE DEVICE (IUD) N/A 2022    Procedure: REMOVAL OF INTRAUTERINE DEVICE (IUD);  Surgeon: Julia Abad MD;  [facility];  Service: Gynecology     Social History   Social History     Substance and Sexual Activity   Alcohol Use Yes    Comment: rare     Social History     Substance and Sexual Activity   Drug Use Not Currently    Types: Marijuana     Social History     Tobacco Use   Smoking Status Former    Current packs/day: 0.00    Types: Cigars, Cigarettes    Start date: 2015    Quit date: 2022    Years since quittin.5   Smokeless Tobacco Never     Family History   Problem Relation Age of Onset    Diabetes Mother     Lupus Mother   "      Meds/Allergies       Current Outpatient Medications:     dicyclomine (BENTYL) 20 mg tablet    acetaminophen (TYLENOL) 325 mg tablet    celecoxib (CeleBREX) 200 mg capsule    meloxicam (MOBIC) 15 mg tablet    methimazole (TAPAZOLE) 5 mg tablet    metoprolol succinate (TOPROL-XL) 25 mg 24 hr tablet    polyethylene glycol (GOLYTELY) 4000 mL solution    traMADol (Ultram) 50 mg tablet    Allergies   Allergen Reactions    Shellfish Allergy - Food Allergy Other (See Comments) and Rash     Itching and swelling of mouth & lips       Objective     Blood pressure 160/94, temperature 98.8 °F (37.1 °C), temperature source Tympanic, height 5' 9\" (1.753 m), weight 94.3 kg (208 lb), last menstrual period 02/14/2024, not currently breastfeeding. Body mass index is 30.72 kg/m².    PHYSICAL EXAM:    General: Well-appearing, NAD  Eyes: no conjunctival icterus or pallor  Abdominal: Soft, non-tender, non-distended  Neuro: alert and oriented  Psych: Normal affect         Lab Results:   Lab Results   Component Value Date    K 3.6 02/28/2024    CO2 28 02/28/2024     02/28/2024    BUN 11 02/28/2024    CREATININE 0.76 02/28/2024     Lab Results   Component Value Date    WBC 5.90 02/28/2024    HGB 13.4 02/28/2024    HCT 40.6 02/28/2024    MCV 94 02/28/2024     02/28/2024     Lab Results   Component Value Date    TP 7.6 02/28/2024    AST 16 02/28/2024    ALT 10 02/28/2024    BILIDIR 0.08 08/20/2021    INR 1.0 11/14/2022      Lab Results   Component Value Date    IRON 90 06/30/2022    FERRITIN 88 06/30/2022     No results found for: \"CHOL\", \"HDL\", \"TRIG\", \"LDL\"    Radiology Results:   I have reviewed the results of any radiology studies performed within the last 90 days.    Whitney Mtoa D.O.  Fellow, PGY-5  Division of Gastroenterology & Hepatology  Available on [facility]  2/28/2024 3:21 PM  "

## 2024-05-06 ENCOUNTER — HOSPITAL ENCOUNTER (EMERGENCY)
Facility: HOSPITAL | Age: 34
Discharge: HOME | End: 2024-05-06
Attending: EMERGENCY MEDICINE
Payer: COMMERCIAL

## 2024-05-06 ENCOUNTER — APPOINTMENT (EMERGENCY)
Dept: RADIOLOGY | Facility: HOSPITAL | Age: 34
End: 2024-05-06

## 2024-05-06 ENCOUNTER — APPOINTMENT (EMERGENCY)
Dept: CT IMAGING | Facility: HOSPITAL | Age: 34
End: 2024-05-06

## 2024-05-06 ENCOUNTER — OFFICE VISIT (OUTPATIENT)
Dept: URGENT CARE | Facility: CLINIC | Age: 34
End: 2024-05-06
Payer: COMMERCIAL

## 2024-05-06 VITALS
HEART RATE: 79 BPM | DIASTOLIC BLOOD PRESSURE: 78 MMHG | RESPIRATION RATE: 18 BRPM | BODY MASS INDEX: 31.55 KG/M2 | TEMPERATURE: 98.9 F | OXYGEN SATURATION: 97 % | HEIGHT: 69 IN | SYSTOLIC BLOOD PRESSURE: 132 MMHG | WEIGHT: 213 LBS

## 2024-05-06 VITALS
RESPIRATION RATE: 18 BRPM | DIASTOLIC BLOOD PRESSURE: 105 MMHG | OXYGEN SATURATION: 97 % | SYSTOLIC BLOOD PRESSURE: 179 MMHG | HEART RATE: 65 BPM | TEMPERATURE: 98.3 F

## 2024-05-06 DIAGNOSIS — T14.8XXA BRUISING: ICD-10-CM

## 2024-05-06 DIAGNOSIS — R53.83 OTHER FATIGUE: ICD-10-CM

## 2024-05-06 DIAGNOSIS — R31.9 HEMATURIA, UNSPECIFIED TYPE: ICD-10-CM

## 2024-05-06 DIAGNOSIS — R10.9 FLANK PAIN: Primary | ICD-10-CM

## 2024-05-06 DIAGNOSIS — R10.9 BILATERAL FLANK PAIN: Primary | ICD-10-CM

## 2024-05-06 LAB
ALBUMIN SERPL BCP-MCNC: 4.2 G/DL (ref 3.5–5)
ALP SERPL-CCNC: 74 U/L (ref 34–104)
ALT SERPL W P-5'-P-CCNC: 11 U/L (ref 7–52)
ANION GAP SERPL CALCULATED.3IONS-SCNC: 6 MMOL/L (ref 4–13)
AST SERPL W P-5'-P-CCNC: 15 U/L (ref 13–39)
ATRIAL RATE: 69 BPM
BACTERIA UR QL AUTO: ABNORMAL /HPF
BASOPHILS # BLD AUTO: 0.04 THOUSANDS/ÂΜL (ref 0–0.1)
BASOPHILS NFR BLD AUTO: 1 % (ref 0–1)
BILIRUB SERPL-MCNC: 0.41 MG/DL (ref 0.2–1)
BILIRUB UR QL STRIP: NEGATIVE
BUN SERPL-MCNC: 11 MG/DL (ref 5–25)
CALCIUM SERPL-MCNC: 9 MG/DL (ref 8.4–10.2)
CARDIAC TROPONIN I PNL SERPL HS: <2 NG/L
CHLORIDE SERPL-SCNC: 105 MMOL/L (ref 96–108)
CLARITY UR: CLEAR
CO2 SERPL-SCNC: 27 MMOL/L (ref 21–32)
COLOR UR: ABNORMAL
CREAT SERPL-MCNC: 0.8 MG/DL (ref 0.6–1.3)
EOSINOPHIL # BLD AUTO: 0.31 THOUSAND/ÂΜL (ref 0–0.61)
EOSINOPHIL NFR BLD AUTO: 5 % (ref 0–6)
ERYTHROCYTE [DISTWIDTH] IN BLOOD BY AUTOMATED COUNT: 13.5 % (ref 11.6–15.1)
GFR SERPL CREATININE-BSD FRML MDRD: 95 ML/MIN/1.73SQ M
GLUCOSE SERPL-MCNC: 83 MG/DL (ref 65–140)
GLUCOSE UR STRIP-MCNC: NEGATIVE MG/DL
HCT VFR BLD AUTO: 40.4 % (ref 34.8–46.1)
HGB BLD-MCNC: 13 G/DL (ref 11.5–15.4)
HGB UR QL STRIP.AUTO: ABNORMAL
IMM GRANULOCYTES # BLD AUTO: 0.02 THOUSAND/UL (ref 0–0.2)
IMM GRANULOCYTES NFR BLD AUTO: 0 % (ref 0–2)
KETONES UR STRIP-MCNC: NEGATIVE MG/DL
LEUKOCYTE ESTERASE UR QL STRIP: NEGATIVE
LIPASE SERPL-CCNC: 9 U/L (ref 11–82)
LYMPHOCYTES # BLD AUTO: 2.05 THOUSANDS/ÂΜL (ref 0.6–4.47)
LYMPHOCYTES NFR BLD AUTO: 31 % (ref 14–44)
MCH RBC QN AUTO: 30.2 PG (ref 26.8–34.3)
MCHC RBC AUTO-ENTMCNC: 32.2 G/DL (ref 31.4–37.4)
MCV RBC AUTO: 94 FL (ref 82–98)
MONOCYTES # BLD AUTO: 0.53 THOUSAND/ÂΜL (ref 0.17–1.22)
MONOCYTES NFR BLD AUTO: 8 % (ref 4–12)
MUCOUS THREADS UR QL AUTO: ABNORMAL
NEUTROPHILS # BLD AUTO: 3.58 THOUSANDS/ÂΜL (ref 1.85–7.62)
NEUTS SEG NFR BLD AUTO: 55 % (ref 43–75)
NITRITE UR QL STRIP: NEGATIVE
NON-SQ EPI CELLS URNS QL MICRO: ABNORMAL /HPF
NRBC BLD AUTO-RTO: 0 /100 WBCS
P AXIS: 59 DEGREES
PH UR STRIP.AUTO: 6.5 [PH]
PLATELET # BLD AUTO: 284 THOUSANDS/UL (ref 149–390)
PMV BLD AUTO: 10.7 FL (ref 8.9–12.7)
POTASSIUM SERPL-SCNC: 3.6 MMOL/L (ref 3.5–5.3)
PR INTERVAL: 172 MS
PROT SERPL-MCNC: 7.4 G/DL (ref 6.4–8.4)
PROT UR STRIP-MCNC: NEGATIVE MG/DL
QRS AXIS: 26 DEGREES
QRSD INTERVAL: 78 MS
QT INTERVAL: 422 MS
QTC INTERVAL: 452 MS
RBC # BLD AUTO: 4.3 MILLION/UL (ref 3.81–5.12)
RBC #/AREA URNS AUTO: ABNORMAL /HPF
SL AMB  POCT GLUCOSE, UA: NEGATIVE
SL AMB LEUKOCYTE ESTERASE,UA: ABNORMAL
SL AMB POCT BILIRUBIN,UA: NEGATIVE
SL AMB POCT BLOOD,UA: ABNORMAL
SL AMB POCT CLARITY,UA: CLEAR
SL AMB POCT COLOR,UA: YELLOW
SL AMB POCT KETONES,UA: NEGATIVE
SL AMB POCT NITRITE,UA: NEGATIVE
SL AMB POCT PH,UA: 7
SL AMB POCT SPECIFIC GRAVITY,UA: 1.01
SL AMB POCT URINE PROTEIN: NEGATIVE
SL AMB POCT UROBILINOGEN: 0.2
SODIUM SERPL-SCNC: 138 MMOL/L (ref 135–147)
SP GR UR STRIP.AUTO: 1.02 (ref 1–1.03)
T WAVE AXIS: 23 DEGREES
UROBILINOGEN UR STRIP-ACNC: <2 MG/DL
VENTRICULAR RATE: 69 BPM
WBC # BLD AUTO: 6.53 THOUSAND/UL (ref 4.31–10.16)
WBC #/AREA URNS AUTO: ABNORMAL /HPF

## 2024-05-06 PROCEDURE — 80053 COMPREHEN METABOLIC PANEL: CPT | Performed by: EMERGENCY MEDICINE

## 2024-05-06 PROCEDURE — 81002 URINALYSIS NONAUTO W/O SCOPE: CPT

## 2024-05-06 PROCEDURE — 83690 ASSAY OF LIPASE: CPT | Performed by: EMERGENCY MEDICINE

## 2024-05-06 PROCEDURE — 36415 COLL VENOUS BLD VENIPUNCTURE: CPT | Performed by: EMERGENCY MEDICINE

## 2024-05-06 PROCEDURE — 81001 URINALYSIS AUTO W/SCOPE: CPT | Performed by: EMERGENCY MEDICINE

## 2024-05-06 PROCEDURE — 85025 COMPLETE CBC W/AUTO DIFF WBC: CPT | Performed by: EMERGENCY MEDICINE

## 2024-05-06 PROCEDURE — 93010 ELECTROCARDIOGRAM REPORT: CPT | Performed by: INTERNAL MEDICINE

## 2024-05-06 PROCEDURE — 93005 ELECTROCARDIOGRAM TRACING: CPT

## 2024-05-06 PROCEDURE — 84484 ASSAY OF TROPONIN QUANT: CPT | Performed by: EMERGENCY MEDICINE

## 2024-05-06 PROCEDURE — 99284 EMERGENCY DEPT VISIT MOD MDM: CPT

## 2024-05-06 PROCEDURE — 99213 OFFICE O/P EST LOW 20 MIN: CPT

## 2024-05-06 PROCEDURE — 99285 EMERGENCY DEPT VISIT HI MDM: CPT | Performed by: EMERGENCY MEDICINE

## 2024-05-06 PROCEDURE — 87086 URINE CULTURE/COLONY COUNT: CPT

## 2024-05-06 PROCEDURE — 71046 X-RAY EXAM CHEST 2 VIEWS: CPT

## 2024-05-06 RX ORDER — ACETAMINOPHEN 325 MG/1
975 TABLET ORAL ONCE
Status: COMPLETED | OUTPATIENT
Start: 2024-05-06 | End: 2024-05-06

## 2024-05-06 RX ADMIN — ACETAMINOPHEN 975 MG: 325 TABLET, FILM COATED ORAL at 16:34

## 2024-05-06 NOTE — ED PROVIDER NOTES
"History  Chief Complaint   Patient presents with    Flank Pain     Bilateral flank pain for 2-3 months. Pt reports pain \"has progressed to the point that it is difficult to walk\" Pt requested for CT and x-ray.      34-year-old female presents with over a month of bilateral flank pain, a feeling of incomplete emptying of her bladder, polyuria, polydipsia, some mild abdominal pain, substernal chest pain, cough, dyspnea on exertion, headache, nausea but no vomiting, reports that she has been seen in urgent care and they found lots of white blood cells in her urine, and some blood but she was on her period, reports she has been on amoxicillin for tooth pain, and has a history of ulcerative colitis, is not currently taking anything for her ulcerative colitis.  Patient drinks occasionally, smokes approximately 1 cigarette a week, does not use any drugs.        Prior to Admission Medications   Prescriptions Last Dose Informant Patient Reported? Taking?   acetaminophen (TYLENOL) 325 mg tablet  Self No No   Sig: Take 2 tablets (650 mg total) by mouth every 6 (six) hours   celecoxib (CeleBREX) 200 mg capsule  Self Yes No   Patient not taking: Reported on 8/30/2023   dicyclomine (BENTYL) 20 mg tablet   No No   Sig: Take 1 tablet (20 mg total) by mouth every 6 (six) hours as needed (cramps)   Patient not taking: Reported on 5/6/2024   meloxicam (MOBIC) 15 mg tablet  Self Yes No   Sig: Take 15 mg by mouth daily   methimazole (TAPAZOLE) 5 mg tablet  Self No No   Sig: Take 0.5 tablets (2.5 mg total) by mouth 3 (three) times a day   Patient not taking: Reported on 8/30/2023   metoprolol succinate (TOPROL-XL) 25 mg 24 hr tablet  Self Yes No   Sig: Take 25 mg by mouth daily   Patient not taking: Reported on 5/15/2023   polyethylene glycol (GOLYTELY) 4000 mL solution   No No   Sig: Take 4,000 mL by mouth once for 1 dose   traMADol (Ultram) 50 mg tablet  Self No No   Sig: Take 1 tablet (50 mg total) by mouth every 6 (six) hours as " needed for severe pain      Facility-Administered Medications: None       Past Medical History:   Diagnosis Date    Abnormal Pap smear of cervix     Abnormal uterine bleeding (AUB) 2022    Gonorrhea     History of positive PPD     Hypertension     Hypothyroid     Ulcerative colitis (HCC)        Past Surgical History:   Procedure Laterality Date    APPENDECTOMY      [place] HYSTEROSCOPY BX ENDOMETRIUM&/POLYPC W/WO D&C N/A 2022    Procedure: DILATATION AND CURETTAGE (D&C) WITH HYSTEROSCOPY;  Surgeon: Julia Abad MD;  [facility];  Service: Gynecology    REMOVAL OF INTRAUTERINE DEVICE (IUD) N/A 2022    Procedure: REMOVAL OF INTRAUTERINE DEVICE (IUD);  Surgeon: Julia Abad MD;  [facility];  Service: Gynecology       Family History   Problem Relation Age of Onset    Diabetes Mother     Lupus Mother      I have reviewed and agree with the history as documented.    E-Cigarette/Vaping    E-Cigarette Use Never User      E-Cigarette/Vaping Substances     Social History     Tobacco Use    Smoking status: Former     Current packs/day: 0.00     Types: Cigars, Cigarettes     Start date: 2015     Quit date: 2022     Years since quittin.7    Smokeless tobacco: Never   Vaping Use    Vaping status: Never Used   Substance Use Topics    Alcohol use: Yes     Comment: rare    Drug use: Not Currently     Types: Marijuana       Review of Systems   Constitutional:  Negative for fever.   HENT:  Negative for congestion.    Eyes:  Negative for visual disturbance.   Respiratory:  Positive for cough and shortness of breath.    Cardiovascular:  Positive for chest pain.   Gastrointestinal:  Positive for abdominal pain and nausea. Negative for constipation, diarrhea and vomiting.   Endocrine: Positive for polyuria.   Genitourinary:  Positive for hematuria. Negative for dysuria.   Musculoskeletal:  Positive for back pain. Negative for myalgias.   Neurological:  Positive for headaches.  Negative for dizziness.       Physical Exam  Physical Exam  Vitals and nursing note reviewed.   Constitutional:       General: She is not in acute distress.     Appearance: Normal appearance. She is well-developed.   HENT:      Head: Normocephalic and atraumatic.   Eyes:      Extraocular Movements: Extraocular movements intact.      Conjunctiva/sclera: Conjunctivae normal.   Cardiovascular:      Rate and Rhythm: Normal rate and regular rhythm.   Pulmonary:      Effort: Pulmonary effort is normal. No respiratory distress.      Breath sounds: Normal breath sounds.   Abdominal:      General: There is no distension.      Palpations: Abdomen is soft.      Tenderness: There is abdominal tenderness in the left upper quadrant. There is no right CVA tenderness or left CVA tenderness.   Musculoskeletal:         General: No swelling.      Cervical back: Neck supple.   Skin:     General: Skin is warm and dry.      Capillary Refill: Capillary refill takes less than 2 seconds.   Neurological:      General: No focal deficit present.      Mental Status: She is alert and oriented to person, place, and time.   Psychiatric:         Mood and Affect: Mood normal.         Vital Signs  ED Triage Vitals   Temperature Pulse Respirations Blood Pressure SpO2   05/06/24 1444 05/06/24 1444 05/06/24 1444 05/06/24 1444 05/06/24 1444   98.3 °F (36.8 °C) 88 18 (!) 179/105 99 %      Temp Source Heart Rate Source Patient Position - Orthostatic VS BP Location FiO2 (%)   05/06/24 1444 05/06/24 1444 05/06/24 1444 05/06/24 1444 --   Oral Monitor Sitting Right arm       Pain Score       05/06/24 1634       8           Vitals:    05/06/24 1444 05/06/24 1645   BP: (!) 179/105    Pulse: 88 65   Patient Position - Orthostatic VS: Sitting          Visual Acuity      ED Medications  Medications   acetaminophen (TYLENOL) tablet 975 mg (975 mg Oral Given 5/6/24 1634)       Diagnostic Studies  Results Reviewed       Procedure Component Value Units Date/Time     Urine Microscopic [[id number]]  (Abnormal) Collected: 05/06/24 1810    Lab Status: Final result Specimen: Urine, Clean Catch Updated: 05/06/24 1834     RBC, UA 2-4 /hpf      WBC, UA 4-10 /hpf      Epithelial Cells Occasional /hpf      Bacteria, UA Occasional /hpf      MUCUS THREADS Occasional    UA (URINE) with reflex to Scope [[id number]]  (Abnormal) Collected: 05/06/24 1810    Lab Status: Final result Specimen: Urine, Clean Catch Updated: 05/06/24 1828     Color, UA Light Yellow     Clarity, UA Clear     Specific Gravity, UA 1.020     pH, UA 6.5     Leukocytes, UA Negative     Nitrite, UA Negative     Protein, UA Negative mg/dl      Glucose, UA Negative mg/dl      Ketones, UA Negative mg/dl      Urobilinogen, UA <2.0 mg/dl      Bilirubin, UA Negative     Occult Blood, UA Moderate    HS Troponin 0hr (reflex protocol) [[id number]]  (Normal) Collected: 05/06/24 1634    Lab Status: Final result Specimen: Blood from Arm, Right Updated: 05/06/24 1704     hs TnI 0hr <2 ng/L     Comprehensive metabolic panel [[id number]] Collected: 05/06/24 1634    Lab Status: Final result Specimen: Blood from Arm, Right Updated: 05/06/24 1658     Sodium 138 mmol/L      Potassium 3.6 mmol/L      Chloride 105 mmol/L      CO2 27 mmol/L      ANION GAP 6 mmol/L      BUN 11 mg/dL      Creatinine 0.80 mg/dL      Glucose 83 mg/dL      Calcium 9.0 mg/dL      AST 15 U/L      ALT 11 U/L      Alkaline Phosphatase 74 U/L      Total Protein 7.4 g/dL      Albumin 4.2 g/dL      Total Bilirubin 0.41 mg/dL      eGFR 95 ml/min/1.73sq m     Narrative:      National Kidney Disease Foundation guidelines for Chronic Kidney Disease (CKD):     Stage 1 with normal or high GFR (GFR > 90 mL/min/1.73 square meters)    Stage 2 Mild CKD (GFR = 60-89 mL/min/1.73 square meters)    Stage 3A Moderate CKD (GFR = 45-59 mL/min/1.73 square meters)    Stage 3B Moderate CKD (GFR = 30-44 mL/min/1.73 square meters)    Stage 4 Severe CKD (GFR = 15-29 mL/min/1.73 square meters)     Stage 5 End Stage CKD (GFR <15 mL/min/1.73 square meters)  Note: GFR calculation is accurate only with a steady state creatinine    Lipase [[id number]]  (Abnormal) Collected: 05/06/24 1634    Lab Status: Final result Specimen: Blood from Arm, Right Updated: 05/06/24 1658     Lipase 9 u/L     CBC and differential [[id number]] Collected: 05/06/24 1634    Lab Status: Final result Specimen: Blood from Arm, Right Updated: 05/06/24 1642     WBC 6.53 Thousand/uL      RBC 4.30 Million/uL      Hemoglobin 13.0 g/dL      Hematocrit 40.4 %      MCV 94 fL      MCH 30.2 pg      MCHC 32.2 g/dL      RDW 13.5 %      MPV 10.7 fL      Platelets 284 Thousands/uL      nRBC 0 /100 WBCs      Segmented % 55 %      Immature Grans % 0 %      Lymphocytes % 31 %      Monocytes % 8 %      Eosinophils Relative 5 %      Basophils Relative 1 %      Absolute Neutrophils 3.58 Thousands/µL      Absolute Immature Grans 0.02 Thousand/uL      Absolute Lymphocytes 2.05 Thousands/µL      Absolute Monocytes 0.53 Thousand/µL      Eosinophils Absolute 0.31 Thousand/µL      Basophils Absolute 0.04 Thousands/µL                    XR chest 2 views    (Results Pending)              Procedures  ECG 12 Lead Documentation Only    Date/Time: 5/6/2024 5:19 PM    Performed by: Jer Cheng MD  Authorized by: Jer Cheng MD    ECG reviewed by me, the ED Provider: yes    Patient location:  ED  Previous ECG:     Previous ECG:  Compared to current    Similarity:  No change  Interpretation:     Interpretation: normal    Rate:     ECG rate:  69    ECG rate assessment: normal    Rhythm:     Rhythm: sinus rhythm    Ectopy:     Ectopy: none    QRS:     QRS axis:  Normal    QRS intervals:  Normal  Conduction:     Conduction: normal    ST segments:     ST segments:  Normal  T waves:     T waves: normal             ED Course  ED Course as of 05/06/24 2226   Mon May 06, 2024   1645 My wet read of the patient's chest x-ray is unremarkable with no  consolidation or other pathology.           Medical Decision Making  34-year-old female presents with polyuria, polydipsia, mild abdominal pain, reported flank pain although no CVA tenderness, history of ulcerative colitis that is being treated with medications at this time, and chest pain, shortness of breath, cough, will be evaluated for a variety of pathology including ACS, pneumonia, ascending urinary tract infection, diabetes mellitus, she has a history of an appendectomy and so small bowel obstruction also be evaluated.  The patient is requesting simply Tylenol for her headache but otherwise is not in need of any additional pain medications, her disposition is pending the results of her workup.    The patient is declining a CT, reporting that she was recently worked up for similar complaints which were unremarkable, she now believes that her pain is more radicular pain from her back, she is requesting discharge home with follow-up recommendations for back pain.  The patient states that she has had multiple flareups of her ulcerative colitis and this feels completely different from that, she is not interested in any further workup for intra-abdominal pathology.  Patient will be discharged home will be encouraged to follow-up with the orthopedic spine clinic, and will be given return indications and follow-up instructions.    Amount and/or Complexity of Data Reviewed  Labs: ordered.  Radiology: ordered.    Risk  OTC drugs.             Disposition  Final diagnoses:   Bilateral flank pain     Time reflects when diagnosis was documented in both MDM as applicable and the Disposition within this note       Time User Action Codes Description Comment    5/6/2024  6:34 PM Jer Cheng Add [R10.9] Bilateral flank pain           ED Disposition       ED Disposition   Discharge    Condition   Stable    Date/Time   Mon May 6, 2024  6:34 PM    Comment   Nely Velazquez discharge to home/self care.                    Follow-up Information       Follow up With Specialties Details Why Contact Info Additional Information    Rakesh Reyes MD Family Medicine Schedule an appointment as soon as possible for a visit in 3 days For follow-up [address] [facility] [address]  [phone]       [facility] Emergency Department Emergency Medicine Go to  As needed [address] [facility] [address]  [phone] [facility] Emergency Department, [address] [place], [place], [address]    [facility] Orthopedic Surgery Schedule an appointment as soon as possible for a visit in 3 days For follow-up [address] [address] [phone] [facility] [address] [address] [phone]            Discharge Medication List as of 5/6/2024  6:40 PM        CONTINUE these medications which have NOT CHANGED    Details   acetaminophen (TYLENOL) 325 mg tablet Take 2 tablets (650 mg total) by mouth every 6 (six) hours, Starting Thu 8/18/2022, Normal      celecoxib (CeleBREX) 200 mg capsule Starting Wed 5/10/2023, Historical Med      dicyclomine (BENTYL) 20 mg tablet Take 1 tablet (20 mg total) by mouth every 6 (six) hours as needed (cramps), Starting Wed 2/28/2024, Normal      meloxicam (MOBIC) 15 mg tablet Take 15 mg by mouth daily, Historical Med      methimazole (TAPAZOLE) 5 mg tablet Take 0.5 tablets (2.5 mg total) by mouth 3 (three) times a day, Starting Mon 5/15/2023, Normal      metoprolol succinate (TOPROL-XL) 25 mg 24 hr tablet Take 25 mg by mouth daily, Starting Fri 7/29/2022, Historical Med      polyethylene glycol (GOLYTELY) 4000 mL solution Take 4,000 mL by mouth once for 1 dose, Starting Fri 11/17/2023, Normal      traMADol (Ultram) 50 mg tablet Take 1 tablet (50 mg total) by mouth every 6 (six) hours as needed for severe pain, Starting Fri 11/17/2023,  Normal                 PDMP Review         Value Time User    PDMP Reviewed  Yes 11/17/2023  2:20 PM Kelly Lopez PA-C            ED Provider  Electronically Signed by             Jer Cheng MD  05/06/24 9155

## 2024-05-06 NOTE — ED NOTES
Pt requesting to speak with provider over a concern with her CT order. Pt made aware via TT     Evelin Duran RN  05/06/24 2923

## 2024-05-06 NOTE — Clinical Note
Nely Velazquez was seen and treated in our emergency department on 5/6/2024.    No restrictions            Diagnosis:     Nely  may return to work on return date.    She may return on this date: 05/08/2024         If you have any questions or concerns, please don't hesitate to call.      Evelin Duran RN    ______________________________           _______________          _______________  Hospital Representative                              Date                                Time

## 2024-05-06 NOTE — PATIENT INSTRUCTIONS
You have chosen to seek further evaluation of your symptoms in the ED.    Follow-up with GI and your PCP after the ED.

## 2024-05-06 NOTE — PROGRESS NOTES
[facility]        NAME: Nely Velazquez is a 34 y.o. female  : 1990    MRN: [id number]  DATE: May 7, 2024  TIME: 9:35 PM    Assessment and Plan   Flank pain [R10.9]  1. Flank pain  POCT urine dip    Urine culture    Transfer to other facility    Urine culture      2. Hematuria, unspecified type  Transfer to other facility      3. Bruising  Transfer to other facility      4. Other fatigue  Transfer to other facility          Urine dip completed showing large blood and moderate leuks. Will be sent for culture. Patient states she did just finish menstrual cycle. She is declining treatment for UTI at time of today's visit and is requesting further testing for evaluation of her symptoms. Discussed limitations of testing capabilities at [facility] and patient has chosen to seek further evaluation in the ED.      Patient Instructions     You have chosen to seek further evaluation of your symptoms in the ED.    Follow-up with GI and your PCP after the ED.    If tests are performed, our office will contact you with results only if changes need to made to the care plan discussed with you at the visit. You can review your full results on [facility].      Chief Complaint     Chief Complaint   Patient presents with    Back Pain     Patient states that she has back pain on both sides. Fatigue, getting bruises for no reason         History of Present Illness       34-year-old female presenting with bilateral flank pain for over one month. Patient states pain has become worse and she feels very fatigued. Reports associated headaches, abdominal pain, and nausea without vomiting. No known fevers. Has history of renal calculi however this pain feels different. Denies dysuria and urinary frequency/urgency. Notes history of UC, last saw GI end of February, not currently on any medications. Also requesting evaluation of generalized bruising. Labs were ordered by GI for evaluation of this back in February which  patient did not yet have drawn.        Review of Systems   Review of Systems   Constitutional:  Positive for fatigue. Negative for chills and fever.   Respiratory:  Negative for chest tightness and shortness of breath.    Cardiovascular:  Negative for chest pain and palpitations.   Gastrointestinal:  Positive for abdominal pain and nausea. Negative for blood in stool, constipation, diarrhea and vomiting.   Genitourinary:  Positive for flank pain. Negative for dysuria, frequency and urgency.   Skin:  Positive for color change (bruising, generalized). Negative for pallor and rash.   Neurological:  Positive for headaches. Negative for dizziness and light-headedness.         Current Medications       Current Outpatient Medications:     acetaminophen (TYLENOL) 325 mg tablet, Take 2 tablets (650 mg total) by mouth every 6 (six) hours, Disp: 30 tablet, Rfl: 0    amoxicillin (AMOXIL) 500 mg capsule, , Disp: , Rfl:     celecoxib (CeleBREX) 200 mg capsule, , Disp: , Rfl:     dicyclomine (BENTYL) 20 mg tablet, Take 1 tablet (20 mg total) by mouth every 6 (six) hours as needed (cramps) (Patient not taking: Reported on 5/6/2024), Disp: 90 tablet, Rfl: 3    ibuprofen (MOTRIN) 600 mg tablet, , Disp: , Rfl:     meloxicam (MOBIC) 15 mg tablet, Take 15 mg by mouth daily, Disp: , Rfl:     methimazole (TAPAZOLE) 5 mg tablet, Take 0.5 tablets (2.5 mg total) by mouth 3 (three) times a day (Patient not taking: Reported on 8/30/2023), Disp: 45 tablet, Rfl: 3    metoprolol succinate (TOPROL-XL) 25 mg 24 hr tablet, Take 25 mg by mouth daily (Patient not taking: Reported on 5/15/2023), Disp: , Rfl:     polyethylene glycol (GOLYTELY) 4000 mL solution, Take 4,000 mL by mouth once for 1 dose, Disp: 4000 mL, Rfl: 0    traMADol (Ultram) 50 mg tablet, Take 1 tablet (50 mg total) by mouth every 6 (six) hours as needed for severe pain, Disp: 20 tablet, Rfl: 0    Current Allergies     Allergies as of 05/06/2024 - Reviewed 05/06/2024   Allergen  "Reaction Noted    Shellfish allergy - food allergy Other (See Comments) and Rash 07/15/2013            The following portions of the patient's history were reviewed and updated as appropriate: allergies, current medications, past family history, past medical history, past social history, past surgical history and problem list.     Past Medical History:   Diagnosis Date    Abnormal Pap smear of cervix     Abnormal uterine bleeding (AUB) 8/14/2022    Gonorrhea     History of positive PPD     Hypertension     Hypothyroid     Ulcerative colitis (HCC)        Past Surgical History:   Procedure Laterality Date    APPENDECTOMY      NE HYSTEROSCOPY BX ENDOMETRIUM&/POLYPC W/WO D&C N/A 8/17/2022    Procedure: DILATATION AND CURETTAGE (D&C) WITH HYSTEROSCOPY;  Surgeon: Julia Abad MD;  [facility];  Service: Gynecology    REMOVAL OF INTRAUTERINE DEVICE (IUD) N/A 8/17/2022    Procedure: REMOVAL OF INTRAUTERINE DEVICE (IUD);  Surgeon: Julia Abad MD;  [facility];  Service: Gynecology       Family History   Problem Relation Age of Onset    Diabetes Mother     Lupus Mother          Medications have been verified.        Objective   /78   Pulse 79   Temp 98.9 °F (37.2 °C)   Resp 18   Ht 5' 9\" (1.753 m)   Wt 96.6 kg (213 lb)   LMP 05/05/2024 (Approximate)   SpO2 97%   BMI 31.45 kg/m²        Physical Exam     Physical Exam  Vitals and nursing note reviewed.   Constitutional:       General: She is not in acute distress.     Appearance: She is not ill-appearing or diaphoretic.   HENT:      Head: Normocephalic.      Mouth/Throat:      Mouth: Mucous membranes are moist.      Pharynx: Oropharynx is clear.   Cardiovascular:      Rate and Rhythm: Normal rate and regular rhythm.      Pulses: Normal pulses.      Heart sounds: Normal heart sounds.   Pulmonary:      Effort: Pulmonary effort is normal.      Breath sounds: Normal breath sounds.   Musculoskeletal:         General: Normal range " of motion.      Cervical back: Normal range of motion and neck supple.   Skin:     General: Skin is warm and dry.      Capillary Refill: Capillary refill takes less than 2 seconds.   Neurological:      Mental Status: She is alert and oriented to person, place, and time.

## 2024-05-07 LAB — BACTERIA UR CULT: NORMAL

## 2024-05-07 RX ORDER — AMOXICILLIN 500 MG/1
CAPSULE ORAL
COMMUNITY
Start: 2024-04-24

## 2024-05-07 RX ORDER — IBUPROFEN 600 MG/1
TABLET ORAL
COMMUNITY
Start: 2024-04-24

## 2024-05-07 NOTE — PROGRESS NOTES
[facility] - Outpatient Note  Nely Velazquez 34 y.o. female MRN: [id number]  Unit/Bed#:  Encounter: [id number]          ASSESSMENT AND PLAN:      34 y.o. female with medical history significant for UC (dx 2012) previously on Lialda, h/o latent TB s/p Isoniazid, hyperthyroidism, vitamin D deficiency, and HTN, who presents for follow up visit.     Patient continues to have loose nonbloody stools 4-5 times a day despite trial of Bentyl. She reported worsening of her diarrhea with use of Bentyl resulting in self-discontinuation. I do not suspect this is a flare of her UC given normal fecal calprotectin recently and nonbloody stools. Recent infectious stool studies were also negative. She reports diarrhea improves with fasting which is consistent with osmotic diarrhea. She has overall improvement in her symptoms with abstaining from gluten consumption. We will rule out other etiologies such as thyroid dysfunction in the setting of fatigue and weight gain, celiac disease, EPI, and vitamin B12 and D deficiency given paresthesias and significant fatigue.     - Follow up diarrhea workup.  - Trial fiber supplementation to help bulk up her stools.    FOLLOW-UP:  Return in about 3 months (around 8/8/2024).    VISIT DIAGNOSES AND ORDERS:      1. Diarrhea, unspecified type    2. Fatigue, unspecified type    3. Weight gain      Orders Placed This Encounter   Procedures    Pancreatic elastase, fecal    Vitamin D 25 hydroxy    Vitamin B12    TSH, 3rd generation with Free T4 reflex    IgA    Tissue transglutaminase, IgA         ______________________________________________________________________    HPI:  Ms. Nely Velazquez is a 34 y.o. female with medical history significant for UC (dx 2012) previously on Lialda, h/o latent TB s/p Isoniazid, hyperthyroidism, vitamin D deficiency, and HTN, who presents for follow up visit.     She was last seen in the clinic with me in 2/2024 for L sided abdominal pain  believed to be in the setting of IBS 2/2 stressors and disrupted circadian rhythm. She was advised to improve sleep hygiene and trial Bentyl.    Interval: She reports having diarrhea with the Bentyl and stopped taking it. She has bowel movements 4-5 times a day, soft bowel movements. With liquids like ginger ale she does note small clumps. She does note a lot of back pain and decrease appetite with fatigue and shortness of breath along with weight gain. She does have diarrhea that improves with fasting and has noted significant improvement since avoiding gluten.    Denies heartburn, dysphagia, odynophagia, nausea, vomiting, diarrhea, constipation, BRBPR, melena, abdominal pain, change in bowel habits, unintentional weight loss.     COY 11/2023- mild erythematous mucosa at the IC valve not c/w IBD.  EGD 11/2023- normal    REVIEW OF SYSTEMS:  10 point ROS reviewed and negative except otherwise noted in the HPI above.     Historical Information   Past Medical History:   Diagnosis Date    Abnormal Pap smear of cervix     Abnormal uterine bleeding (AUB) 8/14/2022    Gonorrhea     History of positive PPD     Hypertension     Hypothyroid     Ulcerative colitis (HCC)      Past Surgical History:   Procedure Laterality Date    APPENDECTOMY      TN HYSTEROSCOPY BX ENDOMETRIUM&/POLYPC W/WO D&C N/A 8/17/2022    Procedure: DILATATION AND CURETTAGE (D&C) WITH HYSTEROSCOPY;  Surgeon: Julia Abad MD;  [facility];  Service: Gynecology    REMOVAL OF INTRAUTERINE DEVICE (IUD) N/A 8/17/2022    Procedure: REMOVAL OF INTRAUTERINE DEVICE (IUD);  Surgeon: Julia Abad MD;  [facility];  Service: Gynecology     Social History   Social History     Substance and Sexual Activity   Alcohol Use Yes    Comment: rare     Social History     Substance and Sexual Activity   Drug Use Not Currently    Types: Marijuana     Social History     Tobacco Use   Smoking Status Former    Current packs/day: 0.00    Types:  "Cigars, Cigarettes    Start date: 2015    Quit date: 2022    Years since quittin.7   Smokeless Tobacco Never     Family History   Problem Relation Age of Onset    Diabetes Mother     Lupus Mother        Meds/Allergies       Current Outpatient Medications:     amoxicillin (AMOXIL) 500 mg capsule    ibuprofen (MOTRIN) 600 mg tablet    acetaminophen (TYLENOL) 325 mg tablet    celecoxib (CeleBREX) 200 mg capsule    dicyclomine (BENTYL) 20 mg tablet    meloxicam (MOBIC) 15 mg tablet    methimazole (TAPAZOLE) 5 mg tablet    metoprolol succinate (TOPROL-XL) 25 mg 24 hr tablet    polyethylene glycol (GOLYTELY) 4000 mL solution    traMADol (Ultram) 50 mg tablet    Allergies   Allergen Reactions    Shellfish Allergy - Food Allergy Other (See Comments) and Rash     Itching and swelling of mouth & lips       Objective     Blood pressure 122/82, temperature 98.9 °F (37.2 °C), temperature source Tympanic, height 5' 9\" (1.753 m), weight 93.4 kg (206 lb), last menstrual period 2024, not currently breastfeeding.    PHYSICAL EXAM:    General: Well-appearing, NAD  Eyes:  no conjunctival icterus or pallor  Abdominal: Soft, non-tender, non-distended  Neuro: alert and oriented  Psych: Normal affect    Lab Results:   Lab Results   Component Value Date    K 3.6 2024    CO2 27 2024     2024    BUN 11 2024    CREATININE 0.80 2024     Lab Results   Component Value Date    WBC 6.53 2024    HGB 13.0 2024    HCT 40.4 2024    MCV 94 2024     2024     Lab Results   Component Value Date    TP 7.4 2024    AST 15 2024    ALT 11 2024    BILIDIR 0.08 2021    INR 1.0 2022      Lab Results   Component Value Date    IRON 90 2022    FERRITIN 88 2022     No results found for: \"CHOL\", \"HDL\", \"TRIG\", \"LDLCAL\", \"LDL\"    Radiology Results:   I have reviewed the results of any radiology studies performed within the last 90 days. "     Whitney Mota D.O.  Fellow, PGY-5  Division of Gastroenterology & Hepatology  Available on [facility]  5/8/2024 11:27 AM   Localized Dermabrasion Text: The patient was draped in routine manner.  Localized dermabrasion using 3 x 17 mm wire brush was performed in routine manner to papillary dermis. This spot dermabrasion is being performed to complete skin cancer reconstruction. It also will eliminate the other sun damaged precancerous cells that are known to be part of the regional effect of a lifetime's worth of sun exposure. This localized dermabrasion is therapeutic and should not be considered cosmetic in any regard. Localized Dermabrasion With Wire Brush Text: The patient was draped in routine manner.  Localized dermabrasion using 3 x 17 mm wire brush was performed in routine manner to papillary dermis. This spot dermabrasion is being performed to complete skin cancer reconstruction. It also will eliminate the other sun damaged precancerous cells that are known to be part of the regional effect of a lifetime's worth of sun exposure. This localized dermabrasion is therapeutic and should not be considered cosmetic in any regard.

## 2024-05-08 ENCOUNTER — OFFICE VISIT (OUTPATIENT)
Dept: GASTROENTEROLOGY | Facility: CLINIC | Age: 34
End: 2024-05-08
Payer: COMMERCIAL

## 2024-05-08 ENCOUNTER — APPOINTMENT (OUTPATIENT)
Dept: LAB | Facility: CLINIC | Age: 34
End: 2024-05-08
Payer: COMMERCIAL

## 2024-05-08 VITALS
HEIGHT: 69 IN | BODY MASS INDEX: 30.51 KG/M2 | DIASTOLIC BLOOD PRESSURE: 82 MMHG | SYSTOLIC BLOOD PRESSURE: 122 MMHG | TEMPERATURE: 98.9 F | WEIGHT: 206 LBS

## 2024-05-08 DIAGNOSIS — R63.5 WEIGHT GAIN: ICD-10-CM

## 2024-05-08 DIAGNOSIS — R53.83 FATIGUE, UNSPECIFIED TYPE: ICD-10-CM

## 2024-05-08 DIAGNOSIS — T14.8XXA BRUISING: ICD-10-CM

## 2024-05-08 DIAGNOSIS — R19.7 DIARRHEA, UNSPECIFIED TYPE: ICD-10-CM

## 2024-05-08 DIAGNOSIS — R19.7 DIARRHEA, UNSPECIFIED TYPE: Primary | ICD-10-CM

## 2024-05-08 LAB
25(OH)D3 SERPL-MCNC: 18.4 NG/ML (ref 30–100)
ALBUMIN SERPL BCP-MCNC: 4.5 G/DL (ref 3.5–5)
ALP SERPL-CCNC: 77 U/L (ref 34–104)
ALT SERPL W P-5'-P-CCNC: 12 U/L (ref 7–52)
ANION GAP SERPL CALCULATED.3IONS-SCNC: 7 MMOL/L (ref 4–13)
AST SERPL W P-5'-P-CCNC: 19 U/L (ref 13–39)
BILIRUB SERPL-MCNC: 0.35 MG/DL (ref 0.2–1)
BUN SERPL-MCNC: 12 MG/DL (ref 5–25)
CALCIUM SERPL-MCNC: 9.5 MG/DL (ref 8.4–10.2)
CHLORIDE SERPL-SCNC: 105 MMOL/L (ref 96–108)
CO2 SERPL-SCNC: 27 MMOL/L (ref 21–32)
CREAT SERPL-MCNC: 0.74 MG/DL (ref 0.6–1.3)
ERYTHROCYTE [DISTWIDTH] IN BLOOD BY AUTOMATED COUNT: 13.4 % (ref 11.6–15.1)
GFR SERPL CREATININE-BSD FRML MDRD: 104 ML/MIN/1.73SQ M
GLUCOSE P FAST SERPL-MCNC: 87 MG/DL (ref 65–99)
HCT VFR BLD AUTO: 42.1 % (ref 34.8–46.1)
HGB BLD-MCNC: 13.4 G/DL (ref 11.5–15.4)
IGA SERPL-MCNC: 175 MG/DL (ref 66–433)
MCH RBC QN AUTO: 30.2 PG (ref 26.8–34.3)
MCHC RBC AUTO-ENTMCNC: 31.8 G/DL (ref 31.4–37.4)
MCV RBC AUTO: 95 FL (ref 82–98)
PLATELET # BLD AUTO: 307 THOUSANDS/UL (ref 149–390)
PMV BLD AUTO: 11.4 FL (ref 8.9–12.7)
POTASSIUM SERPL-SCNC: 3.8 MMOL/L (ref 3.5–5.3)
PROT SERPL-MCNC: 7.9 G/DL (ref 6.4–8.4)
RBC # BLD AUTO: 4.44 MILLION/UL (ref 3.81–5.12)
SODIUM SERPL-SCNC: 139 MMOL/L (ref 135–147)
TSH SERPL DL<=0.05 MIU/L-ACNC: 1.42 UIU/ML (ref 0.45–4.5)
VIT B12 SERPL-MCNC: 666 PG/ML (ref 180–914)
WBC # BLD AUTO: 5.97 THOUSAND/UL (ref 4.31–10.16)

## 2024-05-08 PROCEDURE — 99214 OFFICE O/P EST MOD 30 MIN: CPT | Performed by: INTERNAL MEDICINE

## 2024-05-08 PROCEDURE — 36415 COLL VENOUS BLD VENIPUNCTURE: CPT

## 2024-05-08 PROCEDURE — 82784 ASSAY IGA/IGD/IGG/IGM EACH: CPT

## 2024-05-08 PROCEDURE — 82607 VITAMIN B-12: CPT

## 2024-05-08 PROCEDURE — 85027 COMPLETE CBC AUTOMATED: CPT

## 2024-05-08 PROCEDURE — 80053 COMPREHEN METABOLIC PANEL: CPT

## 2024-05-08 PROCEDURE — 82306 VITAMIN D 25 HYDROXY: CPT

## 2024-05-08 PROCEDURE — 86364 TISS TRNSGLTMNASE EA IG CLAS: CPT

## 2024-05-08 PROCEDURE — 84443 ASSAY THYROID STIM HORMONE: CPT

## 2024-05-09 LAB — TTG IGA SER-ACNC: <2 U/ML (ref 0–3)

## 2024-05-14 ENCOUNTER — OFFICE VISIT (OUTPATIENT)
Dept: OBGYN CLINIC | Facility: CLINIC | Age: 34
End: 2024-05-14
Payer: COMMERCIAL

## 2024-05-14 ENCOUNTER — APPOINTMENT (OUTPATIENT)
Dept: RADIOLOGY | Facility: AMBULARY SURGERY CENTER | Age: 34
End: 2024-05-14
Payer: COMMERCIAL

## 2024-05-14 VITALS
WEIGHT: 206 LBS | HEIGHT: 69 IN | SYSTOLIC BLOOD PRESSURE: 171 MMHG | HEART RATE: 78 BPM | BODY MASS INDEX: 30.51 KG/M2 | DIASTOLIC BLOOD PRESSURE: 123 MMHG

## 2024-05-14 DIAGNOSIS — G89.29 CHRONIC BILATERAL LOW BACK PAIN WITHOUT SCIATICA: Primary | ICD-10-CM

## 2024-05-14 DIAGNOSIS — M54.50 CHRONIC BILATERAL LOW BACK PAIN WITHOUT SCIATICA: Primary | ICD-10-CM

## 2024-05-14 DIAGNOSIS — K51.80 OTHER ULCERATIVE COLITIS WITHOUT COMPLICATION (HCC): ICD-10-CM

## 2024-05-14 DIAGNOSIS — M54.50 CHRONIC BILATERAL LOW BACK PAIN WITHOUT SCIATICA: ICD-10-CM

## 2024-05-14 DIAGNOSIS — G89.29 CHRONIC BILATERAL LOW BACK PAIN WITHOUT SCIATICA: ICD-10-CM

## 2024-05-14 PROCEDURE — 99204 OFFICE O/P NEW MOD 45 MIN: CPT | Performed by: PHYSICAL MEDICINE & REHABILITATION

## 2024-05-14 PROCEDURE — 72100 X-RAY EXAM L-S SPINE 2/3 VWS: CPT
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
## 2024-05-14 NOTE — PATIENT INSTRUCTIONS
Room temperature/warm soaks with epsom salt can help with muscle tightness/cramping.  Epsom salt releases magnesium which can be helpful.    Over-the-counter salonpas patches can be applied to the area of discomfort to help with pain.  There are two types of patches; one contains lidocaine 4% and the other contains menthol (and sometimes camphor and methyl salicylate).  You can try one or the other and see which one works best for you.     You will be starting physical therapy.      It is important to do home exercises as given by your physical therapist as you go through PT to speed up your recovery.    Physical therapy addresses the problems that are causing your pain, instead of covering them up, as some other treatment options do.

## 2024-05-22 ENCOUNTER — EVALUATION (OUTPATIENT)
Dept: PHYSICAL THERAPY | Facility: REHABILITATION | Age: 34
End: 2024-05-22
Payer: COMMERCIAL

## 2024-05-22 DIAGNOSIS — G89.29 CHRONIC BILATERAL LOW BACK PAIN WITHOUT SCIATICA: ICD-10-CM

## 2024-05-22 DIAGNOSIS — M54.50 CHRONIC BILATERAL LOW BACK PAIN WITHOUT SCIATICA: ICD-10-CM

## 2024-05-22 PROCEDURE — 97162 PT EVAL MOD COMPLEX 30 MIN: CPT | Performed by: PHYSICAL THERAPIST
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
## 2024-11-02 ENCOUNTER — HOSPITAL ENCOUNTER (EMERGENCY)
Facility: HOSPITAL | Age: 34
Discharge: HOME | End: 2024-11-02
Attending: EMERGENCY MEDICINE | Admitting: EMERGENCY MEDICINE
Payer: COMMERCIAL

## 2024-11-02 VITALS
SYSTOLIC BLOOD PRESSURE: 173 MMHG | OXYGEN SATURATION: 99 % | RESPIRATION RATE: 18 BRPM | WEIGHT: 209.6 LBS | DIASTOLIC BLOOD PRESSURE: 95 MMHG | HEART RATE: 66 BPM | BODY MASS INDEX: 30.95 KG/M2 | TEMPERATURE: 98.6 F

## 2024-11-02 DIAGNOSIS — M54.9 MUSCULOSKELETAL BACK PAIN: Primary | ICD-10-CM

## 2024-11-02 DIAGNOSIS — M54.32 LEFT SIDED SCIATICA: ICD-10-CM

## 2024-11-02 PROCEDURE — 96372 THER/PROPH/DIAG INJ SC/IM: CPT

## 2024-11-02 PROCEDURE — 99283 EMERGENCY DEPT VISIT LOW MDM: CPT

## 2024-11-02 PROCEDURE — 99284 EMERGENCY DEPT VISIT MOD MDM: CPT | Performed by: EMERGENCY MEDICINE

## 2024-11-02 RX ORDER — KETOROLAC TROMETHAMINE 30 MG/ML
30 INJECTION, SOLUTION INTRAMUSCULAR; INTRAVENOUS ONCE
Status: COMPLETED | OUTPATIENT
Start: 2024-11-02 | End: 2024-11-02

## 2024-11-02 RX ORDER — PREDNISONE 20 MG/1
40 TABLET ORAL ONCE
Status: COMPLETED | OUTPATIENT
Start: 2024-11-02 | End: 2024-11-02

## 2024-11-02 RX ORDER — CYCLOBENZAPRINE HCL 5 MG
5 TABLET ORAL 3 TIMES DAILY PRN
Qty: 15 TABLET | Refills: 0 | Status: SHIPPED | OUTPATIENT
Start: 2024-11-02

## 2024-11-02 RX ORDER — METHYLPREDNISOLONE 4 MG/1
TABLET ORAL
Qty: 21 TABLET | Refills: 0 | Status: SHIPPED | OUTPATIENT
Start: 2024-11-02

## 2024-11-02 RX ORDER — DIAZEPAM 10 MG/2ML
2.5 INJECTION, SOLUTION INTRAMUSCULAR; INTRAVENOUS ONCE
Status: COMPLETED | OUTPATIENT
Start: 2024-11-02 | End: 2024-11-02

## 2024-11-02 RX ADMIN — PREDNISONE 40 MG: 20 TABLET ORAL at 11:31

## 2024-11-02 RX ADMIN — DIAZEPAM 2.5 MG: 5 INJECTION INTRAMUSCULAR; INTRAVENOUS at 11:28

## 2024-11-02 RX ADMIN — KETOROLAC TROMETHAMINE 30 MG: 30 INJECTION, SOLUTION INTRAMUSCULAR at 11:30

## 2024-11-02 NOTE — ED PROVIDER NOTES
Time reflects when diagnosis was documented in both MDM as applicable and the Disposition within this note       Time User Action Codes Description Comment    11/2/2024 11:19 AM Ruddy Peck [M54.9] Musculoskeletal back pain     11/2/2024 11:19 AM Ruddy Peck [M54.32] Left sided sciatica           ED Disposition       ED Disposition   Discharge    Condition   Stable    Date/Time   Sat Nov 2, 2024 11:19 AM    Comment   Nely Velazquez discharge to home/self care.                   Assessment & Plan       Medical Decision Making  34-year-old female in the ED with complaint of atraumatic back pain.  Symptoms are consistent with sciatica.  Will start patient on steroid course, muscle relaxers and NSAIDs.  Patient also advised to follow-up with PT as an outpatient.    Risk  Prescription drug management.             Medications   predniSONE tablet 40 mg (40 mg Oral Given 11/2/24 1131)   diazepam (VALIUM) injection 2.5 mg (2.5 mg Intramuscular Given 11/2/24 1128)   ketorolac (TORADOL) injection 30 mg (30 mg Intramuscular Given 11/2/24 1130)       ED Risk Strat Scores                           SBIRT 22yo+      Flowsheet Row Most Recent Value   Initial Alcohol Screen: US AUDIT-C     1. How often do you have a drink containing alcohol? 3 Filed at: 11/02/2024 1052   2. How many drinks containing alcohol do you have on a typical day you are drinking?  0 Filed at: 11/02/2024 1052   3b. FEMALE Any Age, or MALE 65+: How often do you have 4 or more drinks on one occassion? 0 Filed at: 11/02/2024 1052   Audit-C Score 3 Filed at: 11/02/2024 1052   SUE: How many times in the past year have you...    Used an illegal drug or used a prescription medication for non-medical reasons? Never Filed at: 11/02/2024 1052                            History of Present Illness       Chief Complaint   Patient presents with    Back Pain     Pain left lower back since 10/30. Taking Prednisone that was left over from  Ulcerative Colitis. Cannot take Ibuprofen, no Tylenol.        Past Medical History:   Diagnosis Date    Abnormal Pap smear of cervix     Abnormal uterine bleeding (AUB) 2022    Gonorrhea     History of positive PPD     Hypertension     Hypothyroid     Ulcerative colitis (HCC)       Past Surgical History:   Procedure Laterality Date    APPENDECTOMY      WI HYSTEROSCOPY BX ENDOMETRIUM&/POLYPC W/WO D&C N/A 2022    Procedure: DILATATION AND CURETTAGE (D&C) WITH HYSTEROSCOPY;  Surgeon: Julia Abad MD;  Location: [facility];  Service: Gynecology    REMOVAL OF INTRAUTERINE DEVICE (IUD) N/A 2022    Procedure: REMOVAL OF INTRAUTERINE DEVICE (IUD);  Surgeon: Julia Abad MD;  Location: [facility];  Service: Gynecology      Family History   Problem Relation Age of Onset    Diabetes Mother     Lupus Mother       Social History     Tobacco Use    Smoking status: Former     Current packs/day: 0.00     Types: Cigars, Cigarettes     Start date: 2015     Quit date: 2022     Years since quittin.2    Smokeless tobacco: Never   Vaping Use    Vaping status: Never Used   Substance Use Topics    Alcohol use: Yes     Comment: rare    Drug use: Not Currently     Types: Marijuana      E-Cigarette/Vaping    E-Cigarette Use Never User       E-Cigarette/Vaping Substances      I have reviewed and agree with the history as documented.     Patient is a 34-year-old female with a history of ulcerative colitis that presents emergency department with complaint of left-sided low back pain.  Symptoms began after patient was on her feet for 14 hours at her job.  She denies direct trauma.  Patient states that she has a history of similar symptoms, intermittent over the past years, and has been informed that she has a herniated disc.  She denies bowel or bladder incontinence.  She denies saddle anesthesia.  Patient states that pain is worse with ambulation or movement.  No relief this morning with prednisone  20 mg.  Patient is on day 3 of daily 40 mg prednisone.  Patient has also taken Bentyl, which she states helps her sleep but has not relieved the pain.  She has intermittently taken ibuprofen but is concerned about a flare of ulcerative colitis.  She did not take any NSAIDs this morning.      History provided by:  Patient   used: No    Back Pain  Associated symptoms: no abdominal pain, no dysuria and no fever        Review of Systems   Constitutional:  Negative for chills and fever.   Respiratory:  Negative for cough, chest tightness and shortness of breath.    Gastrointestinal:  Negative for abdominal pain, diarrhea, nausea and vomiting.   Genitourinary:  Negative for dysuria, frequency, hematuria and urgency.   Musculoskeletal:  Positive for back pain. Negative for neck pain and neck stiffness.   All other systems reviewed and are negative.          Objective       ED Triage Vitals [11/02/24 1050]   Temperature Pulse Blood Pressure Respirations SpO2 Patient Position - Orthostatic VS   98.6 °F (37 °C) 66 (!) 173/95 18 99 % Sitting      Temp Source Heart Rate Source BP Location FiO2 (%) Pain Score    Tympanic Monitor Left arm -- 8      Vitals      Date and Time Temp Pulse SpO2 Resp BP Pain Score FACES Pain Rating User   11/02/24 1050 98.6 °F (37 °C) 66 99 % 18 173/95 8 -- SW            Physical Exam  Vitals and nursing note reviewed.   Constitutional:       General: She is not in acute distress.     Appearance: She is well-developed. She is not diaphoretic.   HENT:      Head: Normocephalic and atraumatic.   Eyes:      Conjunctiva/sclera: Conjunctivae normal.      Pupils: Pupils are equal, round, and reactive to light.   Cardiovascular:      Heart sounds: No murmur heard.  Pulmonary:      Effort: Pulmonary effort is normal. No respiratory distress.   Musculoskeletal:         General: Tenderness present. No deformity. Normal range of motion.      Cervical back: Normal range of motion and neck  supple.      Lumbar back: Tenderness and bony tenderness present. Normal range of motion.   Skin:     General: Skin is warm and dry.      Coloration: Skin is not pale.      Findings: No rash.   Neurological:      Mental Status: She is alert.      Cranial Nerves: No cranial nerve deficit.   Psychiatric:         Behavior: Behavior normal.         Results Reviewed       None            No orders to display       Procedures    ED Medication and Procedure Management   Prior to Admission Medications   Prescriptions Last Dose Informant Patient Reported? Taking?   acetaminophen (TYLENOL) 325 mg tablet  Self No No   Sig: Take 2 tablets (650 mg total) by mouth every 6 (six) hours   amoxicillin (AMOXIL) 500 mg capsule  Self Yes No   celecoxib (CeleBREX) 200 mg capsule  Self Yes No   Patient not taking: Reported on 8/30/2023   dicyclomine (BENTYL) 20 mg tablet  Self No No   Sig: Take 1 tablet (20 mg total) by mouth every 6 (six) hours as needed (cramps)   Patient not taking: Reported on 5/6/2024   ibuprofen (MOTRIN) 600 mg tablet  Self Yes No   meloxicam (MOBIC) 15 mg tablet  Self Yes No   Sig: Take 15 mg by mouth daily   methimazole (TAPAZOLE) 5 mg tablet  Self No No   Sig: Take 0.5 tablets (2.5 mg total) by mouth 3 (three) times a day   Patient not taking: Reported on 8/30/2023   metoprolol succinate (TOPROL-XL) 25 mg 24 hr tablet  Self Yes No   Sig: Take 25 mg by mouth daily   Patient not taking: Reported on 5/15/2023   polyethylene glycol (GOLYTELY) 4000 mL solution   No No   Sig: Take 4,000 mL by mouth once for 1 dose   traMADol (Ultram) 50 mg tablet  Self No No   Sig: Take 1 tablet (50 mg total) by mouth every 6 (six) hours as needed for severe pain      Facility-Administered Medications: None     Discharge Medication List as of 11/2/2024 11:21 AM        START taking these medications    Details   cyclobenzaprine (FLEXERIL) 5 mg tablet Take 1 tablet (5 mg total) by mouth 3 (three) times a day as needed for muscle spasms,  Starting Sat 11/2/2024, Normal      methylPREDNISolone 4 MG tablet therapy pack Use as directed on package, Normal           CONTINUE these medications which have NOT CHANGED    Details   acetaminophen (TYLENOL) 325 mg tablet Take 2 tablets (650 mg total) by mouth every 6 (six) hours, Starting Thu 8/18/2022, Normal      amoxicillin (AMOXIL) 500 mg capsule Historical Med      celecoxib (CeleBREX) 200 mg capsule Starting Wed 5/10/2023, Historical Med      dicyclomine (BENTYL) 20 mg tablet Take 1 tablet (20 mg total) by mouth every 6 (six) hours as needed (cramps), Starting Wed 2/28/2024, Normal      ibuprofen (MOTRIN) 600 mg tablet Historical Med      meloxicam (MOBIC) 15 mg tablet Take 15 mg by mouth daily, Historical Med      methimazole (TAPAZOLE) 5 mg tablet Take 0.5 tablets (2.5 mg total) by mouth 3 (three) times a day, Starting Mon 5/15/2023, Normal      metoprolol succinate (TOPROL-XL) 25 mg 24 hr tablet Take 25 mg by mouth daily, Starting Fri 7/29/2022, Historical Med      polyethylene glycol (GOLYTELY) 4000 mL solution Take 4,000 mL by mouth once for 1 dose, Starting Fri 11/17/2023, Normal      traMADol (Ultram) 50 mg tablet Take 1 tablet (50 mg total) by mouth every 6 (six) hours as needed for severe pain, Starting Fri 11/17/2023, Normal             ED SEPSIS DOCUMENTATION   Time reflects when diagnosis was documented in both MDM as applicable and the Disposition within this note       Time User Action Codes Description Comment    11/2/2024 11:19 AM Ruddy Peck [M54.9] Musculoskeletal back pain     11/2/2024 11:19 AM Ruddy Peck [M54.32] Left sided sciatica                  Ruddy Peck DO  11/03/24 1125

## 2024-11-02 NOTE — Clinical Note
Nely Velazquez was seen and treated in our emergency department on 11/2/2024.                Diagnosis:     Nely  may return to work on return date.    She may return on this date: 11/06/2024         If you have any questions or concerns, please don't hesitate to call.      Magy Winter RN    ______________________________           _______________          _______________  Hospital Representative                              Date                                Time

## 2025-04-25 ENCOUNTER — HOSPITAL ENCOUNTER (EMERGENCY)
Facility: HOSPITAL | Age: 35
Discharge: HOME | End: 2025-04-25
Attending: STUDENT IN AN ORGANIZED HEALTH CARE EDUCATION/TRAINING PROGRAM
Payer: COMMERCIAL

## 2025-04-25 ENCOUNTER — APPOINTMENT (EMERGENCY)
Dept: RADIOLOGY | Facility: HOSPITAL | Age: 35
End: 2025-04-25
Payer: COMMERCIAL

## 2025-04-25 VITALS
SYSTOLIC BLOOD PRESSURE: 161 MMHG | RESPIRATION RATE: 20 BRPM | OXYGEN SATURATION: 98 % | HEART RATE: 80 BPM | TEMPERATURE: 97.7 F | DIASTOLIC BLOOD PRESSURE: 84 MMHG

## 2025-04-25 DIAGNOSIS — S30.0XXA CONTUSION OF LOWER BACK, INITIAL ENCOUNTER: ICD-10-CM
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
## 2025-04-25 LAB
EXT PREGNANCY TEST URINE: NEGATIVE
EXT. CONTROL: NORMAL

## 2025-04-25 PROCEDURE — 72131 CT LUMBAR SPINE W/O DYE: CPT

## 2025-04-25 PROCEDURE — 81025 URINE PREGNANCY TEST: CPT | Performed by: PHYSICIAN ASSISTANT

## 2025-04-25 PROCEDURE — 99284 EMERGENCY DEPT VISIT MOD MDM: CPT

## 2025-04-25 PROCEDURE — 72125 CT NECK SPINE W/O DYE: CPT

## 2025-04-25 RX ORDER — ACETAMINOPHEN 325 MG/1
975 TABLET ORAL ONCE
Status: COMPLETED | OUTPATIENT
Start: 2025-04-25 | End: 2025-04-25

## 2025-04-25 RX ORDER — METHOCARBAMOL 500 MG/1
500 TABLET, FILM COATED ORAL 2 TIMES DAILY
Qty: 14 TABLET | Refills: 0 | Status: SHIPPED | OUTPATIENT
Start: 2025-04-25 | End: 2025-05-02

## 2025-04-25 RX ADMIN — ACETAMINOPHEN 975 MG: 325 TABLET, FILM COATED ORAL at 10:17

## 2025-04-25 NOTE — DISCHARGE INSTRUCTIONS
May take tylenol every 6 hours as needed for pain    Robaxin (methocarbamol) for muscle spasm as prescribed.     Follow up with your primary care provider/ [facility] spine program. Call for appointment    Return to ED for leg weakness/numbness, increased pain, worsening symptoms

## 2025-04-25 NOTE — ED PROVIDER NOTES
Time reflects when diagnosis was documented in both MDM as applicable and the Disposition within this note       Time User Action Codes Description Comment    4/25/2025 12:39 PM Guru Nunez Add [W19.XXXA] Fall     4/25/2025 12:39 PM Guru Nunez Add [S30.0XXA] Contusion of lower back, initial encounter           ED Disposition       ED Disposition   Discharge    Condition   Stable    Date/Time   Fri Apr 25, 2025 12:39 PM    Comment   Nely Velazquez discharge to home/self care.                   Assessment & Plan       Medical Decision Making  Differential dx includes lumbar fx, lumbar contusion, low back strain, cervical strain, cervical fx  I ordered CT c spine and CT L spine. This showed no acute abnormality. Pt was informed of b/l nephrolithiasis.   Rx robaxin  Tylenol q 6 hours for pain  Rest  Ambulatory referral to the comprehensive spine program  Return precautions reviewed.     Amount and/or Complexity of Data Reviewed  Labs: ordered.  Radiology: ordered.    Risk  OTC drugs.  Prescription drug management.             Medications   acetaminophen (TYLENOL) tablet 975 mg (975 mg Oral Given 4/25/25 1017)       ED Risk Strat Scores                    No data recorded                            History of Present Illness       Chief Complaint   Patient presents with    Fall     Pt fell Wed in the bathroom, pt reports of r sided ribcage pain with lower back/hip/buttock paiN. P t denies hitting head       Past Medical History:   Diagnosis Date    Abnormal Pap smear of cervix     Abnormal uterine bleeding (AUB) 8/14/2022    Gonorrhea     History of positive PPD     Hypertension     Hypothyroid     Ulcerative colitis (HCC)       Past Surgical History:   Procedure Laterality Date    APPENDECTOMY      TN HYSTEROSCOPY BX ENDOMETRIUM&/POLYPC W/WO D&C N/A 8/17/2022    Procedure: DILATATION AND CURETTAGE (D&C) WITH HYSTEROSCOPY;  Surgeon: Julia Abad MD;  Location: [facility];  Service: Gynecology     REMOVAL OF INTRAUTERINE DEVICE (IUD) N/A 2022    Procedure: REMOVAL OF INTRAUTERINE DEVICE (IUD);  Surgeon: Julia Abad MD;  Location: [facility];  Service: Gynecology      Family History   Problem Relation Age of Onset    Diabetes Mother     Lupus Mother       Social History     Tobacco Use    Smoking status: Former     Current packs/day: 0.00     Types: Cigars, Cigarettes     Start date: 2015     Quit date: 2022     Years since quittin.7    Smokeless tobacco: Never   Vaping Use    Vaping status: Never Used   Substance Use Topics    Alcohol use: Yes     Comment: rare    Drug use: Not Currently     Types: Marijuana      E-Cigarette/Vaping    E-Cigarette Use Never User       E-Cigarette/Vaping Substances      I have reviewed and agree with the history as documented.     Patient is a 36 yo black female with history of HTN, ulcerative colitis who reports mechanical fall in shower 2 days ago. Landed on R lower back. Reports bruising and pain to lower back. No radiation of pain, numbness, tingling down legs. No bowel or bladder incontinence. Mild neck pain that started the day after the fall. No head trauma or loc. No blood thinners. No cp, sob, abd pain. No other complaints         Review of Systems   Respiratory:  Negative for shortness of breath.    Cardiovascular:  Negative for chest pain.   Gastrointestinal:  Negative for abdominal pain.   Genitourinary:  Negative for dysuria and hematuria.   Musculoskeletal:  Positive for back pain and neck pain.   Neurological:  Negative for headaches.           Objective       ED Triage Vitals   Temperature Pulse Blood Pressure Respirations SpO2 Patient Position - Orthostatic VS   25 0937 25 0937 25 0937 25 0937 25 0937 --   97.7 °F (36.5 °C) 80 (!) 196/93 20 98 %       Temp src Heart Rate Source BP Location FiO2 (%) Pain Score    -- 25 0937 -- -- 25 1017     Monitor   7      Vitals      Date and Time Temp  Pulse SpO2 Resp BP Pain Score FACES Pain Rating User   04/25/25 1200 -- -- -- -- -- 5 -- ORALIA   04/25/25 1020 -- -- -- -- 161/84 -- -- OE   04/25/25 1017 -- -- -- -- -- 7 -- OE   04/25/25 1013 -- -- -- -- 161/84 -- -- ORALIA   04/25/25 0937 97.7 °F (36.5 °C) 80 98 % 20 196/93 -- -- ORALIA            Physical Exam  Vitals and nursing note reviewed.   Constitutional:       General: She is not in acute distress.     Appearance: Normal appearance. She is not ill-appearing, toxic-appearing or diaphoretic.   HENT:      Head: Normocephalic and atraumatic.      Right Ear: Tympanic membrane, ear canal and external ear normal.      Left Ear: Tympanic membrane, ear canal and external ear normal.      Nose: Nose normal.      Mouth/Throat:      Mouth: Mucous membranes are moist.      Pharynx: Oropharynx is clear.   Eyes:      Extraocular Movements: Extraocular movements intact.      Conjunctiva/sclera: Conjunctivae normal.      Pupils: Pupils are equal, round, and reactive to light.   Cardiovascular:      Rate and Rhythm: Normal rate and regular rhythm.      Pulses: Normal pulses.      Heart sounds: Normal heart sounds.   Pulmonary:      Effort: Pulmonary effort is normal.      Breath sounds: Normal breath sounds.   Chest:      Chest wall: No tenderness.   Abdominal:      General: Abdomen is flat. Bowel sounds are normal.      Palpations: Abdomen is soft.      Tenderness: There is no abdominal tenderness. There is no right CVA tenderness, left CVA tenderness, guarding or rebound.      Hernia: No hernia is present.   Musculoskeletal:         General: Normal range of motion.      Comments: Bruising, mild swelling mid lumbar back.    Skin:     General: Skin is warm and dry.      Capillary Refill: Capillary refill takes less than 2 seconds.   Neurological:      General: No focal deficit present.      Mental Status: She is alert and oriented to person, place, and time. Mental status is at baseline.      Cranial Nerves: No cranial nerve  deficit.      Sensory: No sensory deficit.      Motor: No weakness.      Coordination: Coordination normal.         Results Reviewed       Procedure Component Value Units Date/Time    POCT pregnancy, urine [[id number]]  (Normal) Collected: 04/25/25 1030    Lab Status: Final result Updated: 04/25/25 1031     EXT Preg Test, Ur Negative     Control Valid            CT spine lumbar without contrast   Final Interpretation by Jamie Fisher MD (04/25 1104)      No acute osseous abnormality.      Nonobstructing bilateral nephrolithiasis.      Workstation performed: [id number]         CT spine cervical without contrast   Final Interpretation by Jamie Fisher MD (04/25 3423)      No cervical spine fracture or traumatic malalignment.                  Workstation performed: [id number]             Procedures    ED Medication and Procedure Management   Prior to Admission Medications   Prescriptions Last Dose Informant Patient Reported? Taking?   acetaminophen (TYLENOL) 325 mg tablet  Self No No   Sig: Take 2 tablets (650 mg total) by mouth every 6 (six) hours   amoxicillin (AMOXIL) 500 mg capsule  Self Yes No   celecoxib (CeleBREX) 200 mg capsule  Self Yes No   Patient not taking: Reported on 8/30/2023   cyclobenzaprine (FLEXERIL) 5 mg tablet   No No   Sig: Take 1 tablet (5 mg total) by mouth 3 (three) times a day as needed for muscle spasms   dicyclomine (BENTYL) 20 mg tablet  Self No No   Sig: Take 1 tablet (20 mg total) by mouth every 6 (six) hours as needed (cramps)   Patient not taking: Reported on 5/6/2024   ibuprofen (MOTRIN) 600 mg tablet  Self Yes No   meloxicam (MOBIC) 15 mg tablet  Self Yes No   Sig: Take 15 mg by mouth daily   methimazole (TAPAZOLE) 5 mg tablet  Self No No   Sig: Take 0.5 tablets (2.5 mg total) by mouth 3 (three) times a day   Patient not taking: Reported on 8/30/2023   methylPREDNISolone 4 MG tablet therapy pack   No No   Sig: Use as directed on package   metoprolol succinate (TOPROL-XL) 25 mg  24 hr tablet  Self Yes No   Sig: Take 25 mg by mouth daily   Patient not taking: Reported on 5/15/2023   polyethylene glycol (GOLYTELY) 4000 mL solution   No No   Sig: Take 4,000 mL by mouth once for 1 dose   traMADol (Ultram) 50 mg tablet  Self No No   Sig: Take 1 tablet (50 mg total) by mouth every 6 (six) hours as needed for severe pain      Facility-Administered Medications: None     Discharge Medication List as of 4/25/2025 12:43 PM        START taking these medications    Details   methocarbamol (ROBAXIN) 500 mg tablet Take 1 tablet (500 mg total) by mouth 2 (two) times a day for 7 days, Starting Fri 4/25/2025, Until Fri 5/2/2025, Normal           CONTINUE these medications which have NOT CHANGED    Details   acetaminophen (TYLENOL) 325 mg tablet Take 2 tablets (650 mg total) by mouth every 6 (six) hours, Starting Thu 8/18/2022, Normal      amoxicillin (AMOXIL) 500 mg capsule Historical Med      celecoxib (CeleBREX) 200 mg capsule Starting Wed 5/10/2023, Historical Med      cyclobenzaprine (FLEXERIL) 5 mg tablet Take 1 tablet (5 mg total) by mouth 3 (three) times a day as needed for muscle spasms, Starting Sat 11/2/2024, Normal      dicyclomine (BENTYL) 20 mg tablet Take 1 tablet (20 mg total) by mouth every 6 (six) hours as needed (cramps), Starting Wed 2/28/2024, Normal      ibuprofen (MOTRIN) 600 mg tablet Historical Med      meloxicam (MOBIC) 15 mg tablet Take 15 mg by mouth daily, Historical Med      methimazole (TAPAZOLE) 5 mg tablet Take 0.5 tablets (2.5 mg total) by mouth 3 (three) times a day, Starting Mon 5/15/2023, Normal      methylPREDNISolone 4 MG tablet therapy pack Use as directed on package, Normal      metoprolol succinate (TOPROL-XL) 25 mg 24 hr tablet Take 25 mg by mouth daily, Starting Fri 7/29/2022, Historical Med      polyethylene glycol (GOLYTELY) 4000 mL solution Take 4,000 mL by mouth once for 1 dose, Starting Fri 11/17/2023, Normal      traMADol (Ultram) 50 mg tablet Take 1 tablet  (50 mg total) by mouth every 6 (six) hours as needed for severe pain, Starting Fri 11/17/2023, Normal             ED SEPSIS DOCUMENTATION   Time reflects when diagnosis was documented in both MDM as applicable and the Disposition within this note       Time User Action Codes Description Comment    4/25/2025 12:39 PM Guru Nunez Add [W19.XXXA] Fall     4/25/2025 12:39 PM Guru Nunez Add [S30.0XXA] Contusion of lower back, initial encounter                  Guru Nunez PA-C  04/25/25 [id number]

## 2025-04-25 NOTE — Clinical Note
Nely Velazquez was seen and treated in our emergency department on 4/25/2025.                Diagnosis:     Nely  .    She may return on this date:     Nely Velazquez is advised light duty and avoiding long periods of time on her feet for next 5 days     If you have any questions or concerns, please don't hesitate to call.      Guru Nunez PA-C    ______________________________           _______________          _______________  Hospital Representative                              Date                                Time

## 2025-04-25 NOTE — Clinical Note
Nely Velazquez was seen and treated in our emergency department on 4/25/2025.                Diagnosis:     Nely  .    She may return on this date: 04/25/2025         If you have any questions or concerns, please don't hesitate to call.      Chris Villavicencio RN    ______________________________           _______________          _______________  Hospital Representative                              Date                                Time

## 2025-04-28 ENCOUNTER — TELEPHONE (OUTPATIENT)
Dept: PHYSICAL THERAPY | Facility: OTHER | Age: 35
End: 2025-04-28

## 2025-04-28 NOTE — TELEPHONE ENCOUNTER
Call placed to the patient per [facility] referral.    Call rang with no answer just a long continuous tone. No option to LVM..    This is the 1st attempt to reach the patient.  Will defer per protocol.

## 2025-05-02 NOTE — TELEPHONE ENCOUNTER
Call placed to the patient per [facility] referral.    Phone is no longer in service    Closed unable to reach Pt

## 2025-06-02 PROCEDURE — 99285 EMERGENCY DEPT VISIT HI MDM: CPT

## 2025-06-02 PROCEDURE — 99285 EMERGENCY DEPT VISIT HI MDM: CPT | Performed by: EMERGENCY MEDICINE

## 2025-06-03 ENCOUNTER — APPOINTMENT (EMERGENCY)
Dept: RADIOLOGY | Facility: HOSPITAL | Age: 35
End: 2025-06-03

## 2025-06-03 ENCOUNTER — HOSPITAL ENCOUNTER (EMERGENCY)
Facility: HOSPITAL | Age: 35
Discharge: HOME | End: 2025-06-03
Attending: EMERGENCY MEDICINE

## 2025-06-03 VITALS
RESPIRATION RATE: 18 BRPM | HEART RATE: 73 BPM | TEMPERATURE: 98.4 F | SYSTOLIC BLOOD PRESSURE: 167 MMHG | DIASTOLIC BLOOD PRESSURE: 89 MMHG | OXYGEN SATURATION: 96 %

## 2025-06-03 DIAGNOSIS — R07.89 ATYPICAL CHEST PAIN: Primary | ICD-10-CM

## 2025-06-03 LAB
ANION GAP SERPL CALCULATED.3IONS-SCNC: 11 MMOL/L (ref 4–13)
ATRIAL RATE: 71 BPM
BASOPHILS # BLD AUTO: 0.06 THOUSANDS/ÂΜL (ref 0–0.1)
BASOPHILS NFR BLD AUTO: 1 % (ref 0–1)
BUN SERPL-MCNC: 15 MG/DL (ref 5–25)
CALCIUM SERPL-MCNC: 9.4 MG/DL (ref 8.4–10.2)
CARDIAC TROPONIN I PNL SERPL HS: <2 NG/L (ref ?–50)
CHLORIDE SERPL-SCNC: 105 MMOL/L (ref 96–108)
CO2 SERPL-SCNC: 23 MMOL/L (ref 21–32)
CREAT SERPL-MCNC: 0.92 MG/DL (ref 0.6–1.3)
EOSINOPHIL # BLD AUTO: 0.4 THOUSAND/ÂΜL (ref 0–0.61)
EOSINOPHIL NFR BLD AUTO: 4 % (ref 0–6)
ERYTHROCYTE [DISTWIDTH] IN BLOOD BY AUTOMATED COUNT: 13.7 % (ref 11.6–15.1)
GFR SERPL CREATININE-BSD FRML MDRD: 79 ML/MIN/1.73SQ M
GLUCOSE SERPL-MCNC: 115 MG/DL (ref 65–140)
HCG SERPL QL: NEGATIVE
HCT VFR BLD AUTO: 37.9 % (ref 34.8–46.1)
HGB BLD-MCNC: 12.6 G/DL (ref 11.5–15.4)
IMM GRANULOCYTES # BLD AUTO: 0.02 THOUSAND/UL (ref 0–0.2)
IMM GRANULOCYTES NFR BLD AUTO: 0 % (ref 0–2)
LYMPHOCYTES # BLD AUTO: 2.35 THOUSANDS/ÂΜL (ref 0.6–4.47)
LYMPHOCYTES NFR BLD AUTO: 25 % (ref 14–44)
MCH RBC QN AUTO: 31.1 PG (ref 26.8–34.3)
MCHC RBC AUTO-ENTMCNC: 33.2 G/DL (ref 31.4–37.4)
MCV RBC AUTO: 94 FL (ref 82–98)
MONOCYTES # BLD AUTO: 0.75 THOUSAND/ÂΜL (ref 0.17–1.22)
MONOCYTES NFR BLD AUTO: 8 % (ref 4–12)
NEUTROPHILS # BLD AUTO: 5.71 THOUSANDS/ÂΜL (ref 1.85–7.62)
NEUTS SEG NFR BLD AUTO: 62 % (ref 43–75)
NRBC BLD AUTO-RTO: 0 /100 WBCS
P AXIS: 53 DEGREES
PLATELET # BLD AUTO: 277 THOUSANDS/UL (ref 149–390)
PMV BLD AUTO: 11 FL (ref 8.9–12.7)
POTASSIUM SERPL-SCNC: 3.4 MMOL/L (ref 3.5–5.3)
PR INTERVAL: 152 MS
QRS AXIS: 47 DEGREES
QRSD INTERVAL: 80 MS
QT INTERVAL: 404 MS
QTC INTERVAL: 439 MS
RBC # BLD AUTO: 4.05 MILLION/UL (ref 3.81–5.12)
SODIUM SERPL-SCNC: 139 MMOL/L (ref 135–147)
T WAVE AXIS: 21 DEGREES
TSH SERPL DL<=0.05 MIU/L-ACNC: 4.53 UIU/ML (ref 0.45–4.5)
VENTRICULAR RATE: 71 BPM
WBC # BLD AUTO: 9.29 THOUSAND/UL (ref 4.31–10.16)

## 2025-06-03 PROCEDURE — 85025 COMPLETE CBC W/AUTO DIFF WBC: CPT | Performed by: EMERGENCY MEDICINE

## 2025-06-03 PROCEDURE — 93010 ELECTROCARDIOGRAM REPORT: CPT | Performed by: INTERNAL MEDICINE

## 2025-06-03 PROCEDURE — 71045 X-RAY EXAM CHEST 1 VIEW: CPT

## 2025-06-03 PROCEDURE — 80048 BASIC METABOLIC PNL TOTAL CA: CPT | Performed by: EMERGENCY MEDICINE

## 2025-06-03 PROCEDURE — 36415 COLL VENOUS BLD VENIPUNCTURE: CPT | Performed by: EMERGENCY MEDICINE

## 2025-06-03 PROCEDURE — 93005 ELECTROCARDIOGRAM TRACING: CPT

## 2025-06-03 PROCEDURE — 84484 ASSAY OF TROPONIN QUANT: CPT | Performed by: EMERGENCY MEDICINE

## 2025-06-03 PROCEDURE — 84443 ASSAY THYROID STIM HORMONE: CPT | Performed by: EMERGENCY MEDICINE

## 2025-06-03 PROCEDURE — 84703 CHORIONIC GONADOTROPIN ASSAY: CPT | Performed by: EMERGENCY MEDICINE

## 2025-06-03 RX ORDER — KETOROLAC TROMETHAMINE 30 MG/ML
15 INJECTION, SOLUTION INTRAMUSCULAR; INTRAVENOUS ONCE
Status: DISCONTINUED | OUTPATIENT
Start: 2025-06-03 | End: 2025-06-03 | Stop reason: HOSPADM

## 2025-06-04 NOTE — ED PROVIDER NOTES
Final Diagnosis:  1. Atypical chest pain        Chief Complaint   Patient presents with    Chest Pain     Left side cp starting 4 days ago with hypertension, took metoprolol without relief, also took melatonin         HPI  Pt has chest pain    Also w/ htn, checking at home    4 days of symptoms    Tried her metop    No vomiting  No diaphr  Nonexertional    No sob  Has chronic cough  Has latent TB  No fever/weight loss    EMS additionally reports:     - Previous charting underwent limited review with attention to last ED visits, labs, ekgs, and prior imaging.  Chart review reveals :     Admission on 04/25/2025, Discharged on 04/25/2025   Component Date Value Ref Range Status    EXT Preg Test, Ur 04/25/2025 Negative   Final    Control 04/25/2025 Valid   Final       - No language barrier.   - History obtained from patient    - Discuss patient's care, with patient permission or by chart review, with      PMH:   has a past medical history of Abnormal Pap smear of cervix, Abnormal uterine bleeding (AUB) (8/14/2022), Gonorrhea, History of positive PPD, Hypertension, Hypothyroid, and Ulcerative colitis (HCC).    PSH:   has a past surgical history that includes Appendectomy; pr hysteroscopy bx endometrium&/polypc w/wo d&c (N/A, 8/17/2022); and REMOVAL OF INTRAUTERINE DEVICE (IUD) (N/A, 8/17/2022).     Social History:  Tobacco Use: Medium Risk (6/3/2025)    Patient History     Smoking Tobacco Use: Former     Smokeless Tobacco Use: Never     Passive Exposure: Not on file     Alcohol Use: Not on file     No illicit use       ROS:  Pertinent positives/negatives: .     Some ROS may be present in the HPI and would take precedent over these standard questions asked below.   Review of Systems   Cardiovascular:  Positive for chest pain. Negative for palpitations and leg swelling.        CONSTITUTIONAL:  No lethargy. No unexpected weight loss. No change in behavior.  EYES:  No pain, redness, or discharge. No loss of vision. No orbital  trauma or pain.   ENT:  No tinnitus or decreased hearing. No epistaxis/purulent rhinorrhea. No voice change, airway closing, trismus.   CARDIOVASCULAR:  No chest pain. No skin mottling or pallor. No change in exertional capacity  RESPIRATORY:  No hemoptysis. No paroxysmal nocturnal dyspnea. No stridor. No apnea or bluing.   GASTROINTESTINAL:  No vomiting, diarrhea. No distension. No melena. No hematochezia.   GENITOURINARY:  No nocturia. No hematuria or foul smelling or cloudy urine. No discharge. No sores/adenopathy.   MUSCULOSKELETAL:  No contracture.  No new deformity.   INTEGUMENTARY:  No swelling. No unexpected contusions. No abrasions. No lymphangitis.  NEUROLOGIC:  No meningismus. No new numbness of the extremities. No new focal weakness. No postural instability  PSYCHIATRIC:  No SI HI AVH  HEMATOLOGICAL:  No bleeding. No petechiae. No bruising.  ALLERGIES:  No urticaria. No sudden abd cramping. No stridor.    PE:     Physical exam highlights:   Physical Exam       Vitals:    06/03/25 0000 06/03/25 0015 06/03/25 0130 06/03/25 0145   BP: (!) 203/93 (!) 175/95 (!) 171/83 167/89   BP Location: Right arm Right arm Right arm Right arm   Pulse: 80 71 72 73   Resp: 18 12 13 18   Temp: 98.4 °F (36.9 °C)      TempSrc: Oral      SpO2: 100% 99% 97% 96%     Vitals reviewed by me.   Nursing note reviewed  Chaperone present for all sensitive exam.  Const: No acute distress. Alert. Nontoxic. Not diaphoretic.    HEENT: External ears normal. No protrusion drainage swelling. Nose normal. No drainage/traumatic deformity. MM. Mouth with baseline/symmetric movement. No trismus.   Eyes: No squinting. No icterus. No tearing/swelling/drainage. Tracks through the room with normal EOM.   Neck: ROM normal. No rigidity. No meningismus.  Cards: Rate as per vitals Compared to monitor sinus unless documented. Regular Well perfused.  Pulm: Effort and excursion normal. No distress. No audible wheezing/no stridor. Normal resp rate without  retraction or change in work of breathing. CTAB  Abd: No distension beyond baseline. No fluctuant wave. Patient without peritoneal pain with shifting/bumping the bed.   MSK: ROM normal baseline. No deformity. No contractures from baseline.   Skin: No new rashes visible. Well perfused. No wounds visualized on exposed skin  Neuro: Nonfocal. Baseline. CN grossly intact. Moving all four with coordination.   Psych: Normal behavior and affect.        A:  - Nursing note reviewed.    Ddx and MDM  Considered diagnoses  Costochondritis?  Antiinflam    GERD?  Antacid    PE?  Perc out            ACS?  Trop  EKG  HEART below  Procedure Note: EKG  Date/Time: 06/04/25 8:12 AM   Interpreted by: Kike Eugene  Indications / Diagnosis: CP  ECG reviewed by me, the ED Provider: yes   The EKG demonstrates:  Rhythm: sinus    Intervals: normal intervals  Axis: normal axis  QRS/Blocks: normal QRS  ST Changes: No acute ST Changes, no STD/FEDE.  T wave changes: non      Chest xr  R/o pneumonia  R/o pneumothorax  Neg on prelim  F/u formal          Dispo decision       My conversation with consultant reveals:        Decision rules:     HEART Risk Score      Flowsheet Row Most Recent Value   Heart Score Risk Calculator    History 0 Filed at: 06/03/2025 0140   ECG 0 Filed at: 06/03/2025 0140   Age 0 Filed at: 06/03/2025 0140   Risk Factors 1 Filed at: 06/03/2025 0140   Troponin 0 Filed at: 06/03/2025 0140   HEART Score 1 Filed at: 06/03/2025 0140           Wells' Criteria for PE      Flowsheet Row Most Recent Value   Wells' Criteria for PE    Clinical signs and symptoms of DVT 0 Filed at: 06/04/2025 0812   PE is primary diagnosis or equally likely 0 Filed at: 06/04/2025 0812   HR >100 0 Filed at: 06/04/2025 0812   Immobilization at least 3 days or Surgery in the previous 4 weeks 0 Filed at: 06/04/2025 0812   Previous, objectively diagnosed PE or DVT 0 Filed at: 06/04/2025 0812   Hemoptysis 0 Filed at: 06/04/2025 0812   Malignancy with treatment  within 6 months or palliative 0 Filed at: 06/04/2025 0812   Wells' Criteria Total 0 Filed at: 06/04/2025 0812           PERC Rule for PE      Flowsheet Row Most Recent Value   PERC Rule for PE    Age >=50 0 Filed at: 06/04/2025 0812   HR >=100 0 Filed at: 06/04/2025 0812   O2 Sat on room air < 95% 0 Filed at: 06/04/2025 0812   History of PE or DVT 0 Filed at: 06/04/2025 0812   Recent trauma or surgery 0 Filed at: 06/04/2025 0812   Hemoptysis 0 Filed at: 06/04/2025 0812   Exogenous estrogen 0 Filed at: 06/04/2025 0812   Unilateral leg swelling 0 Filed at: 06/04/2025 0812   PERC Rule for PE Results 0 Filed at: 06/04/2025 0812                        My read of the XR/CT scan reveals:     XR chest 1 view portable   Final Result      No acute cardiopulmonary disease.            Workstation performed: [id number]             Orders Placed This Encounter   Procedures    XR chest 1 view portable    TSH    CBC and differential    Basic metabolic panel    HS Troponin 0hr (reflex protocol)    hCG, qualitative pregnancy    ECG 12 lead    ECG 12 lead     Labs Reviewed   TSH, 3RD GENERATION - Abnormal       Result Value Ref Range Status    TSH 3RD GENERATON 4.525 (*) 0.450 - 4.500 uIU/mL Final    Comment: The recommended reference ranges for TSH during pregnancy are as follows:   First trimester 0.100 to 2.500 uIU/mL   Second trimester  0.200 to 3.000 uIU/mL   Third trimester 0.300 to 3.000 uIU/m    Note: Normal ranges may not apply to patients who are transgender, non-binary, or whose legal sex, sex at birth, and gender identity differ.  Adult TSH (3rd generation) reference range follows the recommended guidelines of the American Thyroid Association, January, 2020.   BASIC METABOLIC PANEL - Abnormal    Sodium 139  135 - 147 mmol/L Final    Potassium 3.4 (*) 3.5 - 5.3 mmol/L Final    Chloride 105  96 - 108 mmol/L Final    CO2 23  21 - 32 mmol/L Final    ANION GAP 11  4 - 13 mmol/L Final    BUN 15  5 - 25 mg/dL Final    Creatinine  "0.92  0.60 - 1.30 mg/dL Final    Comment: Standardized to IDMS reference method    Glucose 115  65 - 140 mg/dL Final    Comment: If the patient is fasting, the ADA then defines impaired fasting glucose as > 100 mg/dL and diabetes as > or equal to 123 mg/dL.    Calcium 9.4  8.4 - 10.2 mg/dL Final    eGFR 79  ml/min/1.73sq m Final    Narrative:     National Kidney Disease Foundation guidelines for Chronic Kidney Disease (CKD):     Stage 1 with normal or high GFR (GFR > 90 mL/min/1.73 square meters)    Stage 2 Mild CKD (GFR = 60-89 mL/min/1.73 square meters)    Stage 3A Moderate CKD (GFR = 45-59 mL/min/1.73 square meters)    Stage 3B Moderate CKD (GFR = 30-44 mL/min/1.73 square meters)    Stage 4 Severe CKD (GFR = 15-29 mL/min/1.73 square meters)    Stage 5 End Stage CKD (GFR <15 mL/min/1.73 square meters)  Note: GFR calculation is accurate only with a steady state creatinine   HS TROPONIN I 0HR - Normal    hs TnI 0hr <2  \"Refer to ACS Flowchart\"- see link ng/L Final    Comment:                                              Initial (time 0) result  If >=50 ng/L, Myocardial injury suggested ;  Type of myocardial injury and treatment strategy  to be determined.  If 5-49 ng/L, a delta result at 2 hours will be needed to further evaluate.  If <4 ng/L, and chest pain has been >3 hours since onset, patient may qualify for discharge based on the HEART score in the ED.  If <5 ng/L and <3hours since onset of chest pain, a delta result at 2 hours will be needed to further evaluate.    HS Troponin 99th Percentile URL of a Health Population=12 ng/L with a 95% Confidence Interval of 8-18 ng/L.    Second Troponin (time 2 hours)  If calculated delta >= 20 ng/L,  Myocardial injury suggested ; Type of myocardial injury and treatment strategy to be determined.  If 5-49 ng/L and the calculated delta is 5-19 ng/L, consult medical service for evaluation.  Continue evaluation for ischemia on ecg and other possible etiology and repeat hs " troponin at 4 hours.  If delta is <5 ng/L at 2 hours, consider discharge based on risk stratification via the HEART score (if in ED), or NANNETTE risk score in IP/Observation.    HS Troponin 99th Percentile URL of a Health Population=12 ng/L with a 95% Confidence Interval of 8-18 ng/L.   PREGNANCY TEST (HCG QUALITATIVE) - Normal    Preg, Serum Negative  Negative Final   CBC AND DIFFERENTIAL    WBC 9.29  4.31 - 10.16 Thousand/uL Final    RBC 4.05  3.81 - 5.12 Million/uL Final    Hemoglobin 12.6  11.5 - 15.4 g/dL Final    Hematocrit 37.9  34.8 - 46.1 % Final    MCV 94  82 - 98 fL Final    MCH 31.1  26.8 - 34.3 pg Final    MCHC 33.2  31.4 - 37.4 g/dL Final    RDW 13.7  11.6 - 15.1 % Final    MPV 11.0  8.9 - 12.7 fL Final    Platelets 277  149 - 390 Thousands/uL Final    nRBC 0  /100 WBCs Final    Segmented % 62  43 - 75 % Final    Immature Grans % 0  0 - 2 % Final    Lymphocytes % 25  14 - 44 % Final    Monocytes % 8  4 - 12 % Final    Eosinophils Relative 4  0 - 6 % Final    Basophils Relative 1  0 - 1 % Final    Absolute Neutrophils 5.71  1.85 - 7.62 Thousands/µL Final    Absolute Immature Grans 0.02  0.00 - 0.20 Thousand/uL Final    Absolute Lymphocytes 2.35  0.60 - 4.47 Thousands/µL Final    Absolute Monocytes 0.75  0.17 - 1.22 Thousand/µL Final    Eosinophils Absolute 0.40  0.00 - 0.61 Thousand/µL Final    Basophils Absolute 0.06  0.00 - 0.10 Thousands/µL Final       *Each of these labs was reviewed. Particular standout labs will be noted in the ED Course above     Final Diagnosis:  1. Atypical chest pain          P:  - hospital tx includes   Medications - No data to display      - disposition  Time reflects when diagnosis was documented in both MDM as applicable and the Disposition within this note       Time User Action Codes Description Comment    6/3/2025  1:40 AM Kike Eugene Add [R07.89] Atypical chest pain           ED Disposition       ED Disposition   Discharge    Condition   Stable    Date/Time   Tue Jun  3, 2025 0140    Comment   Nely Velazquez discharge to home/self care.                   Follow-up Information       Follow up With Specialties Details Why Contact Info Additional Information    Rakesh Reyes MD Family Medicine   [address] [place] [address] [phone]       [facility]   [address]  [facility] [id number] [phone] [facility], [address], [place], [place], [id number] [phone]            - patient will call their PCP to let them know they were in the emergency department. We discuss return precautions and patient is agreeable with plan and aformentioned disposition.       - additional treatment intended, if consistent with primary provider:  - patient to follow with :      Discharge Medication List as of 6/3/2025  2:10 AM        CONTINUE these medications which have NOT CHANGED    Details   acetaminophen (TYLENOL) 325 mg tablet Take 2 tablets (650 mg total) by mouth every 6 (six) hours, Starting Thu 8/18/2022, Normal      amoxicillin (AMOXIL) 500 mg capsule Historical Med      celecoxib (CeleBREX) 200 mg capsule Starting Wed 5/10/2023, Historical Med      cyclobenzaprine (FLEXERIL) 5 mg tablet Take 1 tablet (5 mg total) by mouth 3 (three) times a day as needed for muscle spasms, Starting Sat 11/2/2024, Normal      dicyclomine (BENTYL) 20 mg tablet Take 1 tablet (20 mg total) by mouth every 6 (six) hours as needed (cramps), Starting Wed 2/28/2024, Normal      ibuprofen (MOTRIN) 600 mg tablet Historical Med      meloxicam (MOBIC) 15 mg tablet Take 15 mg by mouth daily, Historical Med      methimazole (TAPAZOLE) 5 mg tablet Take 0.5 tablets (2.5 mg total) by mouth 3 (three) times a day, Starting Mon 5/15/2023, Normal      methocarbamol (ROBAXIN) 500 mg tablet Take 1 tablet (500 mg total) by mouth 2 (two) times a day for 7 days,  Starting Fri 4/25/2025, Until Fri 5/2/2025, Normal      methylPREDNISolone 4 MG tablet therapy pack Use as directed on package, Normal      metoprolol succinate (TOPROL-XL) 25 mg 24 hr tablet Take 25 mg by mouth daily, Starting Fri 7/29/2022, Historical Med      polyethylene glycol (GOLYTELY) 4000 mL solution Take 4,000 mL by mouth once for 1 dose, Starting Fri 11/17/2023, Normal      traMADol (Ultram) 50 mg tablet Take 1 tablet (50 mg total) by mouth every 6 (six) hours as needed for severe pain, Starting Fri 11/17/2023, Normal           No discharge procedures on file.  Prior to Admission Medications   Prescriptions Last Dose Informant Patient Reported? Taking?   acetaminophen (TYLENOL) 325 mg tablet  Self No No   Sig: Take 2 tablets (650 mg total) by mouth every 6 (six) hours   amoxicillin (AMOXIL) 500 mg capsule  Self Yes No   celecoxib (CeleBREX) 200 mg capsule  Self Yes No   Patient not taking: Reported on 8/30/2023   cyclobenzaprine (FLEXERIL) 5 mg tablet   No No   Sig: Take 1 tablet (5 mg total) by mouth 3 (three) times a day as needed for muscle spasms   dicyclomine (BENTYL) 20 mg tablet  Self No No   Sig: Take 1 tablet (20 mg total) by mouth every 6 (six) hours as needed (cramps)   Patient not taking: Reported on 5/6/2024   ibuprofen (MOTRIN) 600 mg tablet  Self Yes No   meloxicam (MOBIC) 15 mg tablet  Self Yes No   Sig: Take 15 mg by mouth daily   methimazole (TAPAZOLE) 5 mg tablet  Self No No   Sig: Take 0.5 tablets (2.5 mg total) by mouth 3 (three) times a day   Patient not taking: Reported on 8/30/2023   methocarbamol (ROBAXIN) 500 mg tablet   No No   Sig: Take 1 tablet (500 mg total) by mouth 2 (two) times a day for 7 days   methylPREDNISolone 4 MG tablet therapy pack   No No   Sig: Use as directed on package   metoprolol succinate (TOPROL-XL) 25 mg 24 hr tablet  Self Yes No   Sig: Take 25 mg by mouth daily   Patient not taking: Reported on 5/15/2023   polyethylene glycol (GOLYTELY) 4000 mL solution    "No No   Sig: Take 4,000 mL by mouth once for 1 dose   traMADol (Ultram) 50 mg tablet  Self No No   Sig: Take 1 tablet (50 mg total) by mouth every 6 (six) hours as needed for severe pain      Facility-Administered Medications: None       Portions of the record may have been created with voice recognition software. Occasional wrong word or \"sound a like\" substitutions may have occurred due to the inherent limitations of voice recognition software. Read the chart carefully and recognize, using context, where substitutions have occurred.    Electronically signed by:  MD Kike Chapman MD  06/04/25 0812    "

## (undated) DEVICE — PVC URETHRAL CATHETER: Brand: DOVER

## (undated) DEVICE — TISSUE REMOVAL SYSTEM RESECTING DEVICE: Brand: SYMPHION

## (undated) DEVICE — UNDER BUTTOCKS DRAPE W/FLUID CONTROL POUCH: Brand: CONVERTORS

## (undated) DEVICE — CHLORHEXIDINE 4PCT 4 OZ

## (undated) DEVICE — STRL ALLENTOWN HYSTEROSCOPY PK: Brand: CARDINAL HEALTH

## (undated) DEVICE — PREMIUM DRY TRAY LF: Brand: MEDLINE INDUSTRIES, INC.

## (undated) DEVICE — TISSUE REMOVAL SYSTEM FLUID MANAGEMENT ACCESSORIES: Brand: SYMPHION

## (undated) DEVICE — MAYO STAND COVER: Brand: CONVERTORS

## (undated) DEVICE — GLOVE PI ULTRA TOUCH SZ.7.0